# Patient Record
Sex: FEMALE | Race: WHITE | NOT HISPANIC OR LATINO | Employment: OTHER | URBAN - METROPOLITAN AREA
[De-identification: names, ages, dates, MRNs, and addresses within clinical notes are randomized per-mention and may not be internally consistent; named-entity substitution may affect disease eponyms.]

---

## 2017-06-25 DIAGNOSIS — M54.2 CHRONIC NECK PAIN: ICD-10-CM

## 2017-06-25 DIAGNOSIS — G89.29 CHRONIC NECK PAIN: ICD-10-CM

## 2017-06-26 RX ORDER — METHOCARBAMOL 750 MG/1
TABLET, FILM COATED ORAL
Qty: 90 TABLET | Refills: 0 | Status: SHIPPED | OUTPATIENT
Start: 2017-06-26 | End: 2017-10-08 | Stop reason: SDUPTHER

## 2017-08-17 ENCOUNTER — OFFICE VISIT (OUTPATIENT)
Dept: INTERNAL MEDICINE | Facility: CLINIC | Age: 47
End: 2017-08-17

## 2017-08-17 VITALS
BODY MASS INDEX: 25.65 KG/M2 | TEMPERATURE: 97.9 F | HEIGHT: 72 IN | DIASTOLIC BLOOD PRESSURE: 80 MMHG | OXYGEN SATURATION: 98 % | SYSTOLIC BLOOD PRESSURE: 140 MMHG | WEIGHT: 189.4 LBS | HEART RATE: 84 BPM

## 2017-08-17 DIAGNOSIS — G89.29 CHRONIC NECK PAIN: ICD-10-CM

## 2017-08-17 DIAGNOSIS — M54.2 CHRONIC NECK PAIN: ICD-10-CM

## 2017-08-17 DIAGNOSIS — K58.0 IRRITABLE BOWEL SYNDROME WITH DIARRHEA: ICD-10-CM

## 2017-08-17 DIAGNOSIS — Z00.00 ANNUAL PHYSICAL EXAM: Primary | ICD-10-CM

## 2017-08-17 LAB
25(OH)D3+25(OH)D2 SERPL-MCNC: 49.9 NG/ML
ALBUMIN SERPL-MCNC: 4.5 G/DL (ref 3.5–5.2)
ALBUMIN/GLOB SERPL: 1.5 G/DL
ALP SERPL-CCNC: 75 U/L (ref 40–129)
ALT SERPL-CCNC: 38 U/L (ref 5–33)
AST SERPL-CCNC: 32 U/L (ref 5–32)
BASOPHILS # BLD AUTO: 0.09 10*3/MM3 (ref 0–0.2)
BASOPHILS NFR BLD AUTO: 1.2 % (ref 0–2)
BILIRUB SERPL-MCNC: 0.5 MG/DL (ref 0.2–1.2)
BUN SERPL-MCNC: 14 MG/DL (ref 6–20)
BUN/CREAT SERPL: 17.9 (ref 7–25)
CALCIUM SERPL-MCNC: 9.3 MG/DL (ref 8.6–10.5)
CHLORIDE SERPL-SCNC: 99 MMOL/L (ref 98–107)
CHOLEST SERPL-MCNC: 202 MG/DL (ref 0–200)
CHOLEST/HDLC SERPL: 2.97 {RATIO}
CO2 SERPL-SCNC: 25.6 MMOL/L (ref 22–29)
CREAT SERPL-MCNC: 0.78 MG/DL (ref 0.57–1)
EOSINOPHIL # BLD AUTO: 0.13 10*3/MM3 (ref 0.1–0.3)
EOSINOPHIL NFR BLD AUTO: 1.7 % (ref 0–4)
ERYTHROCYTE [DISTWIDTH] IN BLOOD BY AUTOMATED COUNT: 12 % (ref 11.5–14.5)
GLOBULIN SER CALC-MCNC: 3.1 GM/DL
GLUCOSE SERPL-MCNC: 89 MG/DL (ref 65–99)
HCT VFR BLD AUTO: 44.2 % (ref 37–47)
HDLC SERPL-MCNC: 68 MG/DL (ref 40–60)
HGB BLD-MCNC: 14.7 G/DL (ref 12–16)
IMM GRANULOCYTES # BLD: 0.02 10*3/MM3 (ref 0–0.03)
IMM GRANULOCYTES NFR BLD: 0.3 % (ref 0–0.5)
LDLC SERPL CALC-MCNC: 115 MG/DL (ref 0–100)
LYMPHOCYTES # BLD AUTO: 2.44 10*3/MM3 (ref 0.6–4.8)
LYMPHOCYTES NFR BLD AUTO: 31.7 % (ref 20–45)
MCH RBC QN AUTO: 30.8 PG (ref 27–31)
MCHC RBC AUTO-ENTMCNC: 33.3 G/DL (ref 31–37)
MCV RBC AUTO: 92.7 FL (ref 81–99)
MONOCYTES # BLD AUTO: 0.52 10*3/MM3 (ref 0–1)
MONOCYTES NFR BLD AUTO: 6.8 % (ref 3–8)
NEUTROPHILS # BLD AUTO: 4.5 10*3/MM3 (ref 1.5–8.3)
NEUTROPHILS NFR BLD AUTO: 58.3 % (ref 45–70)
NRBC BLD AUTO-RTO: 0 /100 WBC (ref 0–0)
PLATELET # BLD AUTO: 280 10*3/MM3 (ref 140–500)
POTASSIUM SERPL-SCNC: 4.1 MMOL/L (ref 3.5–5.2)
PROT SERPL-MCNC: 7.6 G/DL (ref 6–8.5)
RBC # BLD AUTO: 4.77 10*6/MM3 (ref 4.2–5.4)
SODIUM SERPL-SCNC: 137 MMOL/L (ref 136–145)
TRIGL SERPL-MCNC: 96 MG/DL (ref 0–150)
TSH SERPL DL<=0.005 MIU/L-ACNC: 1.62 MIU/ML (ref 0.27–4.2)
VIT B12 SERPL-MCNC: 995 PG/ML (ref 211–946)
VLDLC SERPL CALC-MCNC: 19.2 MG/DL (ref 7–27)
WBC # BLD AUTO: 7.7 10*3/MM3 (ref 4.8–10.8)

## 2017-08-17 PROCEDURE — 90471 IMMUNIZATION ADMIN: CPT | Performed by: FAMILY MEDICINE

## 2017-08-17 PROCEDURE — 90715 TDAP VACCINE 7 YRS/> IM: CPT | Performed by: FAMILY MEDICINE

## 2017-08-17 PROCEDURE — 99396 PREV VISIT EST AGE 40-64: CPT | Performed by: FAMILY MEDICINE

## 2017-08-17 RX ORDER — CALCIUM CITRATE 1040MG
1 TABLET ORAL DAILY
COMMUNITY
End: 2021-09-14

## 2017-08-17 RX ORDER — AMITRIPTYLINE HYDROCHLORIDE 50 MG/1
50 TABLET, FILM COATED ORAL NIGHTLY
Qty: 90 TABLET | Refills: 3 | Status: SHIPPED | OUTPATIENT
Start: 2017-08-17 | End: 2018-09-13 | Stop reason: SDUPTHER

## 2017-08-17 RX ORDER — CALCIUM POLYCARBOPHIL 625 MG 625 MG/1
625 TABLET ORAL DAILY
COMMUNITY
End: 2020-11-09

## 2017-08-17 RX ORDER — BACLOFEN 20 MG/1
20 TABLET ORAL NIGHTLY
Qty: 90 TABLET | Refills: 3 | Status: SHIPPED | OUTPATIENT
Start: 2017-08-17 | End: 2018-09-13 | Stop reason: SDUPTHER

## 2017-08-17 RX ORDER — MELOXICAM 15 MG/1
15 TABLET ORAL DAILY
Qty: 90 TABLET | Refills: 3 | Status: SHIPPED | OUTPATIENT
Start: 2017-08-17 | End: 2018-09-13 | Stop reason: SDUPTHER

## 2017-08-17 RX ORDER — CETIRIZINE HYDROCHLORIDE 10 MG/1
10 TABLET ORAL DAILY
COMMUNITY
End: 2022-11-14

## 2017-08-17 RX ORDER — AMITRIPTYLINE HYDROCHLORIDE 50 MG/1
50 TABLET, FILM COATED ORAL NIGHTLY
Qty: 30 TABLET | Refills: 11 | Status: SHIPPED | OUTPATIENT
Start: 2017-08-17 | End: 2017-08-17 | Stop reason: SDUPTHER

## 2017-08-17 RX ORDER — TRAZODONE HYDROCHLORIDE 50 MG/1
50 TABLET ORAL NIGHTLY PRN
Qty: 90 TABLET | Refills: 3 | Status: SHIPPED | OUTPATIENT
Start: 2017-08-17 | End: 2018-09-13 | Stop reason: SDUPTHER

## 2017-08-23 ENCOUNTER — TELEPHONE (OUTPATIENT)
Dept: INTERNAL MEDICINE | Facility: CLINIC | Age: 47
End: 2017-08-23

## 2017-08-23 NOTE — TELEPHONE ENCOUNTER
----- Message from Keara Craig MD sent at 8/23/2017  1:44 PM EDT -----  Please call the patient regarding her result.  Her labs look fine except one of her liver enzymes is borderline elevated.  We'll monitor that.  Thanks.      Pt given lab results.dg

## 2017-09-07 ENCOUNTER — HOSPITAL ENCOUNTER (OUTPATIENT)
Dept: MAMMOGRAPHY | Facility: HOSPITAL | Age: 47
Discharge: HOME | End: 2017-09-07
Attending: FAMILY MEDICINE | Admitting: FAMILY MEDICINE

## 2017-09-07 DIAGNOSIS — Z00.00 ANNUAL PHYSICAL EXAM: ICD-10-CM

## 2017-09-07 PROCEDURE — 77063 BREAST TOMOSYNTHESIS BI: CPT

## 2017-09-07 PROCEDURE — G0202 SCR MAMMO BI INCL CAD: HCPCS

## 2017-09-22 DIAGNOSIS — K58.0 IRRITABLE BOWEL SYNDROME WITH DIARRHEA: ICD-10-CM

## 2017-09-22 RX ORDER — AMITRIPTYLINE HYDROCHLORIDE 25 MG/1
TABLET, FILM COATED ORAL
Qty: 90 TABLET | Refills: 2 | Status: SHIPPED | OUTPATIENT
Start: 2017-09-22 | End: 2018-09-13

## 2017-10-08 DIAGNOSIS — M54.2 CHRONIC NECK PAIN: ICD-10-CM

## 2017-10-08 DIAGNOSIS — G89.29 CHRONIC NECK PAIN: ICD-10-CM

## 2017-10-09 RX ORDER — METHOCARBAMOL 750 MG/1
TABLET, FILM COATED ORAL
Qty: 90 TABLET | Refills: 1 | Status: SHIPPED | OUTPATIENT
Start: 2017-10-09 | End: 2018-05-03 | Stop reason: SDUPTHER

## 2018-05-03 DIAGNOSIS — G89.29 CHRONIC NECK PAIN: ICD-10-CM

## 2018-05-03 DIAGNOSIS — M54.2 CHRONIC NECK PAIN: ICD-10-CM

## 2018-05-04 RX ORDER — METHOCARBAMOL 750 MG/1
TABLET, FILM COATED ORAL
Qty: 90 TABLET | Refills: 0 | Status: SHIPPED | OUTPATIENT
Start: 2018-05-04 | End: 2018-07-28 | Stop reason: SDUPTHER

## 2018-07-28 DIAGNOSIS — G89.29 CHRONIC NECK PAIN: ICD-10-CM

## 2018-07-28 DIAGNOSIS — M54.2 CHRONIC NECK PAIN: ICD-10-CM

## 2018-07-30 RX ORDER — METHOCARBAMOL 750 MG/1
TABLET, FILM COATED ORAL
Qty: 30 TABLET | Refills: 0 | Status: SHIPPED | OUTPATIENT
Start: 2018-07-30 | End: 2018-08-23 | Stop reason: SDUPTHER

## 2018-08-23 DIAGNOSIS — G89.29 CHRONIC NECK PAIN: ICD-10-CM

## 2018-08-23 DIAGNOSIS — M54.2 CHRONIC NECK PAIN: ICD-10-CM

## 2018-08-23 RX ORDER — METHOCARBAMOL 750 MG/1
750 TABLET, FILM COATED ORAL DAILY
Qty: 90 TABLET | Refills: 0 | Status: SHIPPED | OUTPATIENT
Start: 2018-08-23 | End: 2018-09-13 | Stop reason: SDUPTHER

## 2018-08-23 RX ORDER — DESOXIMETASONE 2.5 MG/G
CREAM TOPICAL 2 TIMES DAILY
Qty: 60 G | Refills: 0 | Status: SHIPPED | OUTPATIENT
Start: 2018-08-23 | End: 2022-09-21

## 2018-09-04 DIAGNOSIS — K58.0 IRRITABLE BOWEL SYNDROME WITH DIARRHEA: Primary | ICD-10-CM

## 2018-09-04 DIAGNOSIS — G47.00 INSOMNIA, UNSPECIFIED TYPE: ICD-10-CM

## 2018-09-04 DIAGNOSIS — E53.8 VITAMIN B 12 DEFICIENCY: ICD-10-CM

## 2018-09-04 DIAGNOSIS — Z00.00 HEALTHCARE MAINTENANCE: ICD-10-CM

## 2018-09-06 ENCOUNTER — LAB (OUTPATIENT)
Dept: LAB | Facility: HOSPITAL | Age: 48
End: 2018-09-06

## 2018-09-06 DIAGNOSIS — K58.0 IRRITABLE BOWEL SYNDROME WITH DIARRHEA: ICD-10-CM

## 2018-09-06 DIAGNOSIS — Z00.00 HEALTHCARE MAINTENANCE: ICD-10-CM

## 2018-09-06 DIAGNOSIS — E53.8 VITAMIN B 12 DEFICIENCY: ICD-10-CM

## 2018-09-06 DIAGNOSIS — G47.00 INSOMNIA, UNSPECIFIED TYPE: ICD-10-CM

## 2018-09-06 LAB
ALBUMIN SERPL-MCNC: 4.5 G/DL (ref 3.5–5.2)
ALBUMIN/GLOB SERPL: 1.7 G/DL
ALP SERPL-CCNC: 101 U/L (ref 39–117)
ALT SERPL W P-5'-P-CCNC: 53 U/L (ref 1–33)
ANION GAP SERPL CALCULATED.3IONS-SCNC: 8.8 MMOL/L
AST SERPL-CCNC: 34 U/L (ref 1–32)
BASOPHILS # BLD AUTO: 0.06 10*3/MM3 (ref 0–0.2)
BASOPHILS NFR BLD AUTO: 0.9 % (ref 0–1.5)
BILIRUB SERPL-MCNC: 0.4 MG/DL (ref 0.1–1.2)
BUN BLD-MCNC: 23 MG/DL (ref 6–20)
BUN/CREAT SERPL: 25 (ref 7–25)
CALCIUM SPEC-SCNC: 9.1 MG/DL (ref 8.6–10.5)
CHLORIDE SERPL-SCNC: 102 MMOL/L (ref 98–107)
CHOLEST SERPL-MCNC: 209 MG/DL (ref 0–200)
CO2 SERPL-SCNC: 28.2 MMOL/L (ref 22–29)
CREAT BLD-MCNC: 0.92 MG/DL (ref 0.57–1)
DEPRECATED RDW RBC AUTO: 44.2 FL (ref 37–54)
EOSINOPHIL # BLD AUTO: 0.24 10*3/MM3 (ref 0–0.7)
EOSINOPHIL NFR BLD AUTO: 3.4 % (ref 0.3–6.2)
ERYTHROCYTE [DISTWIDTH] IN BLOOD BY AUTOMATED COUNT: 12.8 % (ref 11.7–13)
GFR SERPL CREATININE-BSD FRML MDRD: 66 ML/MIN/1.73
GLOBULIN UR ELPH-MCNC: 2.7 GM/DL
GLUCOSE BLD-MCNC: 97 MG/DL (ref 65–99)
HCT VFR BLD AUTO: 44.7 % (ref 35.6–45.5)
HDLC SERPL-MCNC: 67 MG/DL (ref 40–60)
HGB BLD-MCNC: 14.3 G/DL (ref 11.9–15.5)
IMM GRANULOCYTES # BLD: 0 10*3/MM3 (ref 0–0.03)
IMM GRANULOCYTES NFR BLD: 0 % (ref 0–0.5)
LDLC SERPL CALC-MCNC: 126 MG/DL (ref 0–100)
LDLC/HDLC SERPL: 1.88 {RATIO}
LYMPHOCYTES # BLD AUTO: 2.61 10*3/MM3 (ref 0.9–4.8)
LYMPHOCYTES NFR BLD AUTO: 37.3 % (ref 19.6–45.3)
MCH RBC QN AUTO: 30.5 PG (ref 26.9–32)
MCHC RBC AUTO-ENTMCNC: 32 G/DL (ref 32.4–36.3)
MCV RBC AUTO: 95.3 FL (ref 80.5–98.2)
MONOCYTES # BLD AUTO: 0.44 10*3/MM3 (ref 0.2–1.2)
MONOCYTES NFR BLD AUTO: 6.3 % (ref 5–12)
NEUTROPHILS # BLD AUTO: 3.64 10*3/MM3 (ref 1.9–8.1)
NEUTROPHILS NFR BLD AUTO: 52.1 % (ref 42.7–76)
PLATELET # BLD AUTO: 229 10*3/MM3 (ref 140–500)
PMV BLD AUTO: 10.4 FL (ref 6–12)
POTASSIUM BLD-SCNC: 4.4 MMOL/L (ref 3.5–5.2)
PROT SERPL-MCNC: 7.2 G/DL (ref 6–8.5)
RBC # BLD AUTO: 4.69 10*6/MM3 (ref 3.9–5.2)
SODIUM BLD-SCNC: 139 MMOL/L (ref 136–145)
TRIGL SERPL-MCNC: 81 MG/DL (ref 0–150)
TSH SERPL DL<=0.05 MIU/L-ACNC: 8.17 MIU/ML (ref 0.27–4.2)
VIT B12 BLD-MCNC: 753 PG/ML (ref 211–946)
VLDLC SERPL-MCNC: 16.2 MG/DL (ref 5–40)
WBC NRBC COR # BLD: 6.99 10*3/MM3 (ref 4.5–10.7)

## 2018-09-06 PROCEDURE — 80053 COMPREHEN METABOLIC PANEL: CPT

## 2018-09-06 PROCEDURE — 85025 COMPLETE CBC W/AUTO DIFF WBC: CPT

## 2018-09-06 PROCEDURE — 82607 VITAMIN B-12: CPT | Performed by: FAMILY MEDICINE

## 2018-09-06 PROCEDURE — 36415 COLL VENOUS BLD VENIPUNCTURE: CPT

## 2018-09-06 PROCEDURE — 84443 ASSAY THYROID STIM HORMONE: CPT

## 2018-09-06 PROCEDURE — 80061 LIPID PANEL: CPT

## 2018-09-13 ENCOUNTER — OFFICE VISIT (OUTPATIENT)
Dept: INTERNAL MEDICINE | Facility: CLINIC | Age: 48
End: 2018-09-13

## 2018-09-13 VITALS
BODY MASS INDEX: 25.81 KG/M2 | HEIGHT: 72 IN | HEART RATE: 88 BPM | WEIGHT: 190.6 LBS | DIASTOLIC BLOOD PRESSURE: 90 MMHG | TEMPERATURE: 97.9 F | SYSTOLIC BLOOD PRESSURE: 148 MMHG | OXYGEN SATURATION: 98 % | RESPIRATION RATE: 16 BRPM

## 2018-09-13 DIAGNOSIS — K58.0 IRRITABLE BOWEL SYNDROME WITH DIARRHEA: ICD-10-CM

## 2018-09-13 DIAGNOSIS — G89.29 CHRONIC NECK PAIN: ICD-10-CM

## 2018-09-13 DIAGNOSIS — M54.2 CHRONIC NECK PAIN: ICD-10-CM

## 2018-09-13 DIAGNOSIS — Z00.00 ANNUAL PHYSICAL EXAM: Primary | ICD-10-CM

## 2018-09-13 DIAGNOSIS — R74.01 ELEVATED TRANSAMINASE LEVEL: ICD-10-CM

## 2018-09-13 DIAGNOSIS — R79.89 ELEVATED TSH: ICD-10-CM

## 2018-09-13 PROCEDURE — 99396 PREV VISIT EST AGE 40-64: CPT | Performed by: FAMILY MEDICINE

## 2018-09-13 RX ORDER — AMITRIPTYLINE HYDROCHLORIDE 50 MG/1
50 TABLET, FILM COATED ORAL NIGHTLY
Qty: 90 TABLET | Refills: 3 | Status: SHIPPED | OUTPATIENT
Start: 2018-09-13 | End: 2019-09-04 | Stop reason: SDUPTHER

## 2018-09-13 RX ORDER — BACLOFEN 20 MG/1
20 TABLET ORAL NIGHTLY
Qty: 90 TABLET | Refills: 3 | Status: SHIPPED | OUTPATIENT
Start: 2018-09-13 | End: 2019-11-07 | Stop reason: SDUPTHER

## 2018-09-13 RX ORDER — TRAZODONE HYDROCHLORIDE 50 MG/1
50 TABLET ORAL NIGHTLY PRN
Qty: 90 TABLET | Refills: 3 | Status: SHIPPED | OUTPATIENT
Start: 2018-09-13 | End: 2019-09-04 | Stop reason: SDUPTHER

## 2018-09-13 RX ORDER — LEVOTHYROXINE SODIUM 0.05 MG/1
50 TABLET ORAL DAILY
Qty: 30 TABLET | Refills: 2 | Status: SHIPPED | OUTPATIENT
Start: 2018-09-13 | End: 2018-10-26 | Stop reason: SDUPTHER

## 2018-09-13 RX ORDER — MELOXICAM 15 MG/1
15 TABLET ORAL DAILY
Qty: 90 TABLET | Refills: 3 | Status: SHIPPED | OUTPATIENT
Start: 2018-09-13 | End: 2019-11-07 | Stop reason: SDUPTHER

## 2018-09-13 RX ORDER — METHOCARBAMOL 750 MG/1
750 TABLET, FILM COATED ORAL DAILY
Qty: 90 TABLET | Refills: 0 | Status: SHIPPED | OUTPATIENT
Start: 2018-09-13 | End: 2019-02-09 | Stop reason: SDUPTHER

## 2018-09-13 NOTE — PROGRESS NOTES
Patient Name: Nadja Shipley    SUBJECTIVE    Nadja is a 48 y.o. female presenting for Annual Exam (CPE, lab results )      Well Adult Physical   Patient here for a comprehensive physical exam.The patient reports problems - fatigue    Do you take any herbs or supplements that were not prescribed by a doctor? no   Are you taking calcium supplements? yes   Are you taking aspirin daily? no     History:  Any STD's in the past? none    Nadja Shipley 48 y.o. female who presents for an Annual Wellness Visit.  she has a history of   Patient Active Problem List   Diagnosis   • Irritable bowel syndrome   • Chronic neck pain   • Rosacea   • Insomnia   .  she has been doing well with new interval problems.      Health Habits:  Dental Exam. up to date  Eye Exam. not up to date -    Exercise: 7 times/week.  Current exercise activities include: walking and yoga      The following portions of the patient's history were reviewed and updated as appropriate: allergies, current medications, past family history, past medical history, past social history, past surgical history and problem list.    Review of Systems   Constitutional: Negative.    HENT: Negative.    Eyes: Negative.    Respiratory: Negative.    Cardiovascular: Negative.    Gastrointestinal: Negative.    Endocrine: Negative.    Genitourinary: Negative.    Musculoskeletal: Negative.    Skin: Negative.    Allergic/Immunologic: Negative.    Neurological: Negative.    Hematological: Negative.    Psychiatric/Behavioral: Negative.        Patient has no known allergies.      Current Outpatient Prescriptions:   •  amitriptyline (ELAVIL) 50 MG tablet, Take 1 tablet by mouth Every Night., Disp: 90 tablet, Rfl: 3  •  baclofen (LIORESAL) 20 MG tablet, Take 1 tablet by mouth Every Night., Disp: 90 tablet, Rfl: 3  •  Calcium Citrate 1040 MG tablet, Take 1 tablet by mouth Daily., Disp: , Rfl:   •  calcium polycarbophil (FIBERCON) 625 MG tablet, Take 625 mg by mouth Daily., Disp: , Rfl:   •   "cetirizine (zyrTEC) 10 MG tablet, Take 10 mg by mouth Daily., Disp: , Rfl:   •  desoximetasone (TOPICORT) 0.25 % cream, Apply  topically to the appropriate area as directed 2 (Two) Times a Day., Disp: 60 g, Rfl: 0  •  fluticasone (FLONASE) 50 MCG/ACT nasal spray, 2 sprays into each nostril daily. Administer 2 sprays in each nostril for each dose., Disp: 3 each, Rfl: 3  •  magnesium chloride ER (MAG-DELAY) 535 (64 Mg) MG CR tablet, Take 1 tablet by mouth Daily., Disp: , Rfl:   •  meloxicam (MOBIC) 15 MG tablet, Take 1 tablet by mouth Daily., Disp: 90 tablet, Rfl: 3  •  methocarbamol (ROBAXIN) 750 MG tablet, Take 1 tablet by mouth Daily., Disp: 90 tablet, Rfl: 0  •  Multiple Vitamins-Minerals (MULTIVITAMIN ADULT PO), Take 1 tablet by mouth Daily., Disp: , Rfl:   •  Probiotic Product (PROBIOTIC ADVANCED PO), Take 1 capsule by mouth Daily., Disp: , Rfl:   •  Simethicone 250 MG capsule, Take 4 capsules by mouth Daily., Disp: , Rfl:   •  traZODone (DESYREL) 50 MG tablet, Take 1 tablet by mouth At Night As Needed for Sleep., Disp: 90 tablet, Rfl: 3  •  levothyroxine (SYNTHROID, LEVOTHROID) 50 MCG tablet, Take 1 tablet by mouth Daily., Disp: 30 tablet, Rfl: 2    OBJECTIVE    /90 (BP Location: Right arm, Patient Position: Sitting, Cuff Size: Large Adult)   Pulse 88   Temp 97.9 °F (36.6 °C) (Oral)   Resp 16   Ht 182.9 cm (72.01\")   Wt 86.5 kg (190 lb 9.6 oz)   SpO2 98%   BMI 25.84 kg/m²     Physical Exam   Constitutional: She is oriented to person, place, and time. She appears well-developed and well-nourished.   HENT:   Head: Normocephalic and atraumatic.   Right Ear: Tympanic membrane and external ear normal.   Left Ear: Tympanic membrane and external ear normal.   Nose: Nose normal.   Mouth/Throat: Oropharynx is clear and moist.   Eyes: Pupils are equal, round, and reactive to light. Conjunctivae and EOM are normal. No scleral icterus.   Neck: Normal range of motion. Neck supple. No thyromegaly present. "   Cardiovascular: Normal rate, regular rhythm, normal heart sounds and intact distal pulses.  Exam reveals no gallop and no friction rub.    No murmur heard.  Pulmonary/Chest: Effort normal and breath sounds normal. No respiratory distress. She has no wheezes. She has no rales.   Abdominal: Soft. Bowel sounds are normal. There is no hepatosplenomegaly.   Musculoskeletal: She exhibits no edema or deformity.   Lymphadenopathy:     She has no cervical adenopathy.   Neurological: She is alert and oriented to person, place, and time. She has normal reflexes. She displays normal reflexes. No cranial nerve deficit. She exhibits normal muscle tone. Coordination normal.   Skin: Skin is warm and dry. No rash noted.   Psychiatric: She has a normal mood and affect. Her behavior is normal. Judgment and thought content normal.   Nursing note and vitals reviewed.        ASSESSMENT AND PLAN  Update vaccines if indicated.    continue current medications, continue current healthy lifestyle patterns and return for routine annual checkups    Nadja was seen today for annual exam.    Diagnoses and all orders for this visit:    Annual physical exam    Chronic neck pain  -     methocarbamol (ROBAXIN) 750 MG tablet; Take 1 tablet by mouth Daily.  -     meloxicam (MOBIC) 15 MG tablet; Take 1 tablet by mouth Daily.  -     baclofen (LIORESAL) 20 MG tablet; Take 1 tablet by mouth Every Night.    Irritable bowel syndrome with diarrhea  -     amitriptyline (ELAVIL) 50 MG tablet; Take 1 tablet by mouth Every Night.    Elevated TSH  -     TSH  -     T4, Free  -     Thyroid Antibodies  -     levothyroxine (SYNTHROID, LEVOTHROID) 50 MCG tablet; Take 1 tablet by mouth Daily.    Elevated transaminase level    Other orders  -     traZODone (DESYREL) 50 MG tablet; Take 1 tablet by mouth At Night As Needed for Sleep.    1.  Annual exam.  Mammogram next year.  Pap smear in 2020.  Vaccines UTD (flu vaccine at work)/    2. Elevated TSH.  Pt symptomatic.   Check thyroid panel today.  Start levothyroxine.    3. Elevated transaminase level.  Recheck in 6 weeks.      [unfilled]    No Follow-up on file.

## 2018-09-14 LAB
T4 FREE SERPL-MCNC: 1.11 NG/DL (ref 0.93–1.7)
THYROGLOB AB SERPL-ACNC: <1 IU/ML (ref 0–0.9)
THYROPEROXIDASE AB SERPL-ACNC: 15 IU/ML (ref 0–34)
TSH SERPL DL<=0.005 MIU/L-ACNC: 8.84 MIU/ML (ref 0.27–4.2)

## 2018-09-20 DIAGNOSIS — R74.01 ELEVATED TRANSAMINASE LEVEL: ICD-10-CM

## 2018-09-20 DIAGNOSIS — E03.9 HYPOTHYROIDISM, UNSPECIFIED TYPE: Primary | ICD-10-CM

## 2018-10-25 ENCOUNTER — LAB (OUTPATIENT)
Dept: LAB | Facility: HOSPITAL | Age: 48
End: 2018-10-25

## 2018-10-25 DIAGNOSIS — E03.9 HYPOTHYROIDISM, UNSPECIFIED TYPE: ICD-10-CM

## 2018-10-25 DIAGNOSIS — R74.01 ELEVATED TRANSAMINASE LEVEL: ICD-10-CM

## 2018-10-25 LAB
ALBUMIN SERPL-MCNC: 4.1 G/DL (ref 3.5–5.2)
ALBUMIN/GLOB SERPL: 1.2 G/DL
ALP SERPL-CCNC: 89 U/L (ref 39–117)
ALT SERPL W P-5'-P-CCNC: 35 U/L (ref 1–33)
ANION GAP SERPL CALCULATED.3IONS-SCNC: 10.4 MMOL/L
AST SERPL-CCNC: 26 U/L (ref 1–32)
BILIRUB SERPL-MCNC: 0.4 MG/DL (ref 0.1–1.2)
BUN BLD-MCNC: 11 MG/DL (ref 6–20)
BUN/CREAT SERPL: 14.1 (ref 7–25)
CALCIUM SPEC-SCNC: 9.2 MG/DL (ref 8.6–10.5)
CHLORIDE SERPL-SCNC: 100 MMOL/L (ref 98–107)
CO2 SERPL-SCNC: 25.6 MMOL/L (ref 22–29)
CREAT BLD-MCNC: 0.78 MG/DL (ref 0.57–1)
GFR SERPL CREATININE-BSD FRML MDRD: 80 ML/MIN/1.73
GLOBULIN UR ELPH-MCNC: 3.3 GM/DL
GLUCOSE BLD-MCNC: 100 MG/DL (ref 65–99)
POTASSIUM BLD-SCNC: 4.3 MMOL/L (ref 3.5–5.2)
PROT SERPL-MCNC: 7.4 G/DL (ref 6–8.5)
SODIUM BLD-SCNC: 136 MMOL/L (ref 136–145)
T4 FREE SERPL-MCNC: 1.24 NG/DL (ref 0.93–1.7)
TSH SERPL DL<=0.05 MIU/L-ACNC: 4.48 MIU/ML (ref 0.27–4.2)

## 2018-10-25 PROCEDURE — 84439 ASSAY OF FREE THYROXINE: CPT

## 2018-10-25 PROCEDURE — 84443 ASSAY THYROID STIM HORMONE: CPT

## 2018-10-25 PROCEDURE — 36415 COLL VENOUS BLD VENIPUNCTURE: CPT

## 2018-10-25 PROCEDURE — 80053 COMPREHEN METABOLIC PANEL: CPT

## 2018-10-26 ENCOUNTER — TELEPHONE (OUTPATIENT)
Dept: INTERNAL MEDICINE | Facility: CLINIC | Age: 48
End: 2018-10-26

## 2018-10-26 DIAGNOSIS — E03.9 HYPOTHYROIDISM, UNSPECIFIED TYPE: Primary | ICD-10-CM

## 2018-10-26 DIAGNOSIS — R79.89 ELEVATED TSH: ICD-10-CM

## 2018-10-26 RX ORDER — LEVOTHYROXINE SODIUM 0.07 MG/1
75 TABLET ORAL DAILY
Qty: 30 TABLET | Refills: 2 | Status: SHIPPED | OUTPATIENT
Start: 2018-10-26 | End: 2019-01-21 | Stop reason: SDUPTHER

## 2018-10-26 NOTE — TELEPHONE ENCOUNTER
----- Message from Keara Craig MD sent at 10/26/2018  2:01 PM EDT -----  Please call the patient regarding her result.  Liver enzymes improved.  Thyroid levels improved but not at goal.  I'm increasing levothyroxine to 75 mg daily and have ordered repeat labs for her to have done in 6 weeks.    Pt given lab results and info regarding meds.dg

## 2018-10-31 ENCOUNTER — RESULTS ENCOUNTER (OUTPATIENT)
Dept: INTERNAL MEDICINE | Facility: CLINIC | Age: 48
End: 2018-10-31

## 2018-10-31 DIAGNOSIS — E03.9 HYPOTHYROIDISM, UNSPECIFIED TYPE: ICD-10-CM

## 2018-12-14 ENCOUNTER — APPOINTMENT (OUTPATIENT)
Dept: LAB | Facility: HOSPITAL | Age: 48
End: 2018-12-14

## 2018-12-14 LAB
T4 FREE SERPL-MCNC: 1.2 NG/DL (ref 0.93–1.7)
TSH SERPL DL<=0.05 MIU/L-ACNC: 3.38 MIU/ML (ref 0.27–4.2)

## 2018-12-14 PROCEDURE — 84439 ASSAY OF FREE THYROXINE: CPT | Performed by: FAMILY MEDICINE

## 2018-12-14 PROCEDURE — 36415 COLL VENOUS BLD VENIPUNCTURE: CPT | Performed by: FAMILY MEDICINE

## 2018-12-14 PROCEDURE — 84443 ASSAY THYROID STIM HORMONE: CPT | Performed by: FAMILY MEDICINE

## 2018-12-18 ENCOUNTER — TELEPHONE (OUTPATIENT)
Dept: INTERNAL MEDICINE | Facility: CLINIC | Age: 48
End: 2018-12-18

## 2018-12-18 NOTE — TELEPHONE ENCOUNTER
----- Message from Keara Craig MD sent at 12/17/2018  5:14 PM EST -----  Please call the patient regarding her result.  Thyroid levels look better.  Continue same dose.    Pt called and given results and instructions.dg

## 2019-01-21 DIAGNOSIS — E03.9 HYPOTHYROIDISM, UNSPECIFIED TYPE: ICD-10-CM

## 2019-01-21 RX ORDER — LEVOTHYROXINE SODIUM 0.07 MG/1
TABLET ORAL
Qty: 90 TABLET | Refills: 1 | Status: SHIPPED | OUTPATIENT
Start: 2019-01-21 | End: 2019-01-22 | Stop reason: SDUPTHER

## 2019-01-22 DIAGNOSIS — E03.9 HYPOTHYROIDISM, UNSPECIFIED TYPE: ICD-10-CM

## 2019-01-22 RX ORDER — LEVOTHYROXINE SODIUM 0.07 MG/1
75 TABLET ORAL DAILY
Qty: 90 TABLET | Refills: 3 | Status: SHIPPED | OUTPATIENT
Start: 2019-01-22 | End: 2019-11-07 | Stop reason: SDUPTHER

## 2019-02-09 DIAGNOSIS — M54.2 CHRONIC NECK PAIN: ICD-10-CM

## 2019-02-09 DIAGNOSIS — G89.29 CHRONIC NECK PAIN: ICD-10-CM

## 2019-02-11 RX ORDER — METHOCARBAMOL 750 MG/1
TABLET, FILM COATED ORAL
Qty: 90 TABLET | Refills: 1 | Status: SHIPPED | OUTPATIENT
Start: 2019-02-11 | End: 2019-08-08 | Stop reason: SDUPTHER

## 2019-06-10 DIAGNOSIS — E03.9 HYPOTHYROIDISM, UNSPECIFIED TYPE: Primary | ICD-10-CM

## 2019-06-12 ENCOUNTER — APPOINTMENT (OUTPATIENT)
Dept: LAB | Facility: HOSPITAL | Age: 49
End: 2019-06-12

## 2019-06-12 LAB
T4 FREE SERPL-MCNC: 1.31 NG/DL (ref 0.93–1.7)
TSH SERPL DL<=0.05 MIU/L-ACNC: 3.27 MIU/ML (ref 0.27–4.2)

## 2019-06-12 PROCEDURE — 84439 ASSAY OF FREE THYROXINE: CPT | Performed by: FAMILY MEDICINE

## 2019-06-12 PROCEDURE — 84443 ASSAY THYROID STIM HORMONE: CPT | Performed by: FAMILY MEDICINE

## 2019-06-12 PROCEDURE — 36415 COLL VENOUS BLD VENIPUNCTURE: CPT | Performed by: FAMILY MEDICINE

## 2019-06-15 ENCOUNTER — RESULTS ENCOUNTER (OUTPATIENT)
Dept: INTERNAL MEDICINE | Facility: CLINIC | Age: 49
End: 2019-06-15

## 2019-06-15 DIAGNOSIS — E03.9 HYPOTHYROIDISM, UNSPECIFIED TYPE: ICD-10-CM

## 2019-08-08 DIAGNOSIS — G89.29 CHRONIC NECK PAIN: ICD-10-CM

## 2019-08-08 DIAGNOSIS — M54.2 CHRONIC NECK PAIN: ICD-10-CM

## 2019-08-09 RX ORDER — METHOCARBAMOL 750 MG/1
TABLET, FILM COATED ORAL
Qty: 90 TABLET | Refills: 0 | Status: SHIPPED | OUTPATIENT
Start: 2019-08-09 | End: 2019-11-07 | Stop reason: SDUPTHER

## 2019-09-04 DIAGNOSIS — K58.0 IRRITABLE BOWEL SYNDROME WITH DIARRHEA: ICD-10-CM

## 2019-09-04 RX ORDER — AMITRIPTYLINE HYDROCHLORIDE 50 MG/1
TABLET, FILM COATED ORAL
Qty: 30 TABLET | Refills: 0 | Status: SHIPPED | OUTPATIENT
Start: 2019-09-04 | End: 2019-11-07 | Stop reason: SDUPTHER

## 2019-09-04 RX ORDER — TRAZODONE HYDROCHLORIDE 50 MG/1
TABLET ORAL
Qty: 30 TABLET | Refills: 0 | Status: SHIPPED | OUTPATIENT
Start: 2019-09-04 | End: 2019-10-01 | Stop reason: SDUPTHER

## 2019-10-02 RX ORDER — TRAZODONE HYDROCHLORIDE 50 MG/1
TABLET ORAL
Qty: 30 TABLET | Refills: 0 | Status: SHIPPED | OUTPATIENT
Start: 2019-10-02 | End: 2019-10-31 | Stop reason: SDUPTHER

## 2019-10-08 ENCOUNTER — TELEPHONE (OUTPATIENT)
Dept: INTERNAL MEDICINE | Facility: CLINIC | Age: 49
End: 2019-10-08

## 2019-10-08 DIAGNOSIS — Z00.00 ROUTINE ADULT HEALTH MAINTENANCE: Primary | ICD-10-CM

## 2019-10-08 NOTE — TELEPHONE ENCOUNTER
Done    ----- Message from Ruby Craig sent at 10/7/2019  3:50 PM EDT -----  Contact: patient  javi    Please add orders to chart for labs patient to have them done at hosp

## 2019-10-31 ENCOUNTER — LAB (OUTPATIENT)
Dept: LAB | Facility: HOSPITAL | Age: 49
End: 2019-10-31

## 2019-10-31 DIAGNOSIS — Z00.00 ROUTINE ADULT HEALTH MAINTENANCE: ICD-10-CM

## 2019-10-31 LAB
ALBUMIN SERPL-MCNC: 4 G/DL (ref 3.5–5.2)
ALBUMIN/GLOB SERPL: 1.1 G/DL
ALP SERPL-CCNC: 88 U/L (ref 39–117)
ALT SERPL W P-5'-P-CCNC: 24 U/L (ref 1–33)
ANION GAP SERPL CALCULATED.3IONS-SCNC: 10.6 MMOL/L (ref 5–15)
AST SERPL-CCNC: 23 U/L (ref 1–32)
BASOPHILS # BLD AUTO: 0.06 10*3/MM3 (ref 0–0.2)
BASOPHILS NFR BLD AUTO: 1.1 % (ref 0–1.5)
BILIRUB SERPL-MCNC: 0.3 MG/DL (ref 0.2–1.2)
BUN BLD-MCNC: 14 MG/DL (ref 6–20)
BUN/CREAT SERPL: 18.4 (ref 7–25)
CALCIUM SPEC-SCNC: 9.1 MG/DL (ref 8.6–10.5)
CHLORIDE SERPL-SCNC: 103 MMOL/L (ref 98–107)
CHOLEST SERPL-MCNC: 234 MG/DL (ref 0–200)
CO2 SERPL-SCNC: 26.4 MMOL/L (ref 22–29)
CREAT BLD-MCNC: 0.76 MG/DL (ref 0.57–1)
DEPRECATED RDW RBC AUTO: 42.5 FL (ref 37–54)
EOSINOPHIL # BLD AUTO: 0.2 10*3/MM3 (ref 0–0.4)
EOSINOPHIL NFR BLD AUTO: 3.8 % (ref 0.3–6.2)
ERYTHROCYTE [DISTWIDTH] IN BLOOD BY AUTOMATED COUNT: 12.2 % (ref 12.3–15.4)
GFR SERPL CREATININE-BSD FRML MDRD: 82 ML/MIN/1.73
GLOBULIN UR ELPH-MCNC: 3.6 GM/DL
GLUCOSE BLD-MCNC: 101 MG/DL (ref 65–99)
HCT VFR BLD AUTO: 42.5 % (ref 34–46.6)
HDLC SERPL QL: 3.25
HDLC SERPL-MCNC: 72 MG/DL (ref 40–60)
HGB BLD-MCNC: 14.1 G/DL (ref 12–15.9)
IMM GRANULOCYTES # BLD AUTO: 0.01 10*3/MM3 (ref 0–0.05)
IMM GRANULOCYTES NFR BLD AUTO: 0.2 % (ref 0–0.5)
LDLC SERPL CALC-MCNC: 151 MG/DL (ref 0–100)
LYMPHOCYTES # BLD AUTO: 2.2 10*3/MM3 (ref 0.7–3.1)
LYMPHOCYTES NFR BLD AUTO: 41.5 % (ref 19.6–45.3)
MCH RBC QN AUTO: 31.1 PG (ref 26.6–33)
MCHC RBC AUTO-ENTMCNC: 33.2 G/DL (ref 31.5–35.7)
MCV RBC AUTO: 93.6 FL (ref 79–97)
MONOCYTES # BLD AUTO: 0.31 10*3/MM3 (ref 0.1–0.9)
MONOCYTES NFR BLD AUTO: 5.8 % (ref 5–12)
NEUTROPHILS # BLD AUTO: 2.52 10*3/MM3 (ref 1.7–7)
NEUTROPHILS NFR BLD AUTO: 47.6 % (ref 42.7–76)
NRBC BLD AUTO-RTO: 0.2 /100 WBC (ref 0–0.2)
PLATELET # BLD AUTO: 247 10*3/MM3 (ref 140–450)
PMV BLD AUTO: 10.2 FL (ref 6–12)
POTASSIUM BLD-SCNC: 4.1 MMOL/L (ref 3.5–5.2)
PROT SERPL-MCNC: 7.6 G/DL (ref 6–8.5)
RBC # BLD AUTO: 4.54 10*6/MM3 (ref 3.77–5.28)
SODIUM BLD-SCNC: 140 MMOL/L (ref 136–145)
T4 FREE SERPL-MCNC: 1.23 NG/DL (ref 0.93–1.7)
TRIGL SERPL-MCNC: 57 MG/DL (ref 0–150)
TSH SERPL DL<=0.05 MIU/L-ACNC: 3.13 UIU/ML (ref 0.27–4.2)
VLDLC SERPL-MCNC: 11.4 MG/DL (ref 5–40)
WBC NRBC COR # BLD: 5.3 10*3/MM3 (ref 3.4–10.8)

## 2019-10-31 PROCEDURE — 85025 COMPLETE CBC W/AUTO DIFF WBC: CPT

## 2019-10-31 PROCEDURE — 84443 ASSAY THYROID STIM HORMONE: CPT

## 2019-10-31 PROCEDURE — 84439 ASSAY OF FREE THYROXINE: CPT

## 2019-10-31 PROCEDURE — 80053 COMPREHEN METABOLIC PANEL: CPT

## 2019-10-31 PROCEDURE — 80061 LIPID PANEL: CPT

## 2019-10-31 PROCEDURE — 36415 COLL VENOUS BLD VENIPUNCTURE: CPT

## 2019-11-01 RX ORDER — TRAZODONE HYDROCHLORIDE 50 MG/1
TABLET ORAL
Qty: 30 TABLET | Refills: 0 | Status: SHIPPED | OUTPATIENT
Start: 2019-11-01 | End: 2019-11-07 | Stop reason: SDUPTHER

## 2019-11-07 ENCOUNTER — OFFICE VISIT (OUTPATIENT)
Dept: INTERNAL MEDICINE | Facility: CLINIC | Age: 49
End: 2019-11-07

## 2019-11-07 VITALS
HEIGHT: 72 IN | BODY MASS INDEX: 25.87 KG/M2 | DIASTOLIC BLOOD PRESSURE: 68 MMHG | TEMPERATURE: 98.5 F | SYSTOLIC BLOOD PRESSURE: 128 MMHG | HEART RATE: 91 BPM | OXYGEN SATURATION: 98 % | WEIGHT: 191 LBS

## 2019-11-07 DIAGNOSIS — G89.29 CHRONIC NECK PAIN: ICD-10-CM

## 2019-11-07 DIAGNOSIS — M54.2 CHRONIC NECK PAIN: ICD-10-CM

## 2019-11-07 DIAGNOSIS — E03.9 HYPOTHYROIDISM, UNSPECIFIED TYPE: ICD-10-CM

## 2019-11-07 DIAGNOSIS — Z00.00 ANNUAL PHYSICAL EXAM: Primary | ICD-10-CM

## 2019-11-07 DIAGNOSIS — Z12.4 ENCOUNTER FOR PAPANICOLAOU SMEAR FOR CERVICAL CANCER SCREENING: ICD-10-CM

## 2019-11-07 DIAGNOSIS — K58.0 IRRITABLE BOWEL SYNDROME WITH DIARRHEA: ICD-10-CM

## 2019-11-07 PROCEDURE — 99396 PREV VISIT EST AGE 40-64: CPT | Performed by: FAMILY MEDICINE

## 2019-11-07 RX ORDER — LEVOTHYROXINE SODIUM 0.07 MG/1
75 TABLET ORAL DAILY
Qty: 90 TABLET | Refills: 3 | Status: SHIPPED | OUTPATIENT
Start: 2019-11-07 | End: 2021-01-07 | Stop reason: SDUPTHER

## 2019-11-07 RX ORDER — MELOXICAM 15 MG/1
15 TABLET ORAL DAILY
Qty: 90 TABLET | Refills: 3 | Status: SHIPPED | OUTPATIENT
Start: 2019-11-07 | End: 2021-09-29

## 2019-11-07 RX ORDER — BACLOFEN 20 MG/1
20 TABLET ORAL NIGHTLY
Qty: 90 TABLET | Refills: 3 | Status: SHIPPED | OUTPATIENT
Start: 2019-11-07 | End: 2022-03-26 | Stop reason: SDUPTHER

## 2019-11-07 RX ORDER — AMITRIPTYLINE HYDROCHLORIDE 50 MG/1
50 TABLET, FILM COATED ORAL
Qty: 90 TABLET | Refills: 1 | Status: SHIPPED | OUTPATIENT
Start: 2019-11-07 | End: 2021-01-07 | Stop reason: SDUPTHER

## 2019-11-07 RX ORDER — TRAZODONE HYDROCHLORIDE 50 MG/1
50 TABLET ORAL NIGHTLY PRN
Qty: 90 TABLET | Refills: 1 | Status: SHIPPED | OUTPATIENT
Start: 2019-11-07 | End: 2020-06-04

## 2019-11-07 RX ORDER — METHOCARBAMOL 750 MG/1
750 TABLET, FILM COATED ORAL DAILY
Qty: 90 TABLET | Refills: 1 | Status: SHIPPED | OUTPATIENT
Start: 2019-11-07 | End: 2020-04-29

## 2019-11-07 NOTE — PROGRESS NOTES
Patient Name: Nadja Shipley    SUBJECTIVE    Nadja is a 49 y.o. female presenting for Annual Exam (pap if time)      Well Adult Physical   Patient here for a comprehensive physical exam.The patient reports no problems    Do you take any herbs or supplements that were not prescribed by a doctor? yes   Are you taking calcium supplements? yes   Are you taking aspirin daily? no     History:  Any STD's in the past? none    Nadja Shipley 49 y.o. female who presents for an Annual Wellness Visit.  she has a history of   Patient Active Problem List   Diagnosis   • Irritable bowel syndrome   • Chronic neck pain   • Rosacea   • Insomnia   • Annual physical exam   .  she has been doing well with new interval problems.      Health Habits:  Dental Exam. up to date  Eye Exam.not up to date  Exercise: 7 times/week.  Current exercise activities include: walking and yoga      The following portions of the patient's history were reviewed and updated as appropriate: allergies, current medications, past family history, past medical history, past social history, past surgical history and problem list.    Review of Systems   Constitutional: Negative.    HENT: Negative.    Eyes: Negative.    Respiratory: Negative.    Cardiovascular: Negative.    Gastrointestinal: Negative.    Endocrine: Negative.    Genitourinary: Negative.    Musculoskeletal: Negative.    Skin: Negative.    Allergic/Immunologic: Negative.    Neurological: Negative.    Hematological: Negative.    Psychiatric/Behavioral: Negative.        Patient has no known allergies.      Current Outpatient Medications:   •  amitriptyline (ELAVIL) 50 MG tablet, Take 1 tablet by mouth every night at bedtime., Disp: 90 tablet, Rfl: 1  •  baclofen (LIORESAL) 20 MG tablet, Take 1 tablet by mouth Every Night., Disp: 90 tablet, Rfl: 3  •  Calcium Citrate 1040 MG tablet, Take 1 tablet by mouth Daily., Disp: , Rfl:   •  calcium polycarbophil (FIBERCON) 625 MG tablet, Take 625 mg by mouth Daily.,  "Disp: , Rfl:   •  cetirizine (zyrTEC) 10 MG tablet, Take 10 mg by mouth Daily., Disp: , Rfl:   •  desoximetasone (TOPICORT) 0.25 % cream, Apply  topically to the appropriate area as directed 2 (Two) Times a Day., Disp: 60 g, Rfl: 0  •  fluticasone (FLONASE) 50 MCG/ACT nasal spray, 2 sprays into each nostril daily. Administer 2 sprays in each nostril for each dose., Disp: 3 each, Rfl: 3  •  levothyroxine (SYNTHROID, LEVOTHROID) 75 MCG tablet, Take 1 tablet by mouth Daily., Disp: 90 tablet, Rfl: 3  •  magnesium chloride ER (MAG-DELAY) 535 (64 Mg) MG CR tablet, Take 1 tablet by mouth Daily., Disp: , Rfl:   •  Multiple Vitamins-Minerals (MULTIVITAMIN ADULT PO), Take 1 tablet by mouth Daily., Disp: , Rfl:   •  Probiotic Product (PROBIOTIC ADVANCED PO), Take 1 capsule by mouth Daily., Disp: , Rfl:   •  Simethicone 250 MG capsule, Take 4 capsules by mouth Daily., Disp: , Rfl:   •  meloxicam (MOBIC) 15 MG tablet, Take 1 tablet by mouth Daily., Disp: 90 tablet, Rfl: 3  •  methocarbamol (ROBAXIN) 750 MG tablet, Take 1 tablet by mouth Daily., Disp: 90 tablet, Rfl: 1  •  traZODone (DESYREL) 50 MG tablet, Take 1 tablet by mouth At Night As Needed for Sleep., Disp: 90 tablet, Rfl: 1    OBJECTIVE    /68 (BP Location: Left arm, Patient Position: Sitting, Cuff Size: Adult)   Pulse 91   Temp 98.5 °F (36.9 °C)   Ht 182.9 cm (72\")   Wt 86.6 kg (191 lb)   SpO2 98%   BMI 25.90 kg/m²     Physical Exam   Constitutional: She is oriented to person, place, and time. She appears well-developed and well-nourished.   HENT:   Head: Normocephalic and atraumatic.   Right Ear: External ear normal.   Left Ear: External ear normal.   Nose: Nose normal.   Mouth/Throat: Oropharynx is clear and moist.   Eyes: Conjunctivae and EOM are normal. Pupils are equal, round, and reactive to light. No scleral icterus.   Neck: Normal range of motion. Neck supple. No thyromegaly present.   Cardiovascular: Normal rate, regular rhythm, normal heart sounds " and intact distal pulses. Exam reveals no gallop and no friction rub.   No murmur heard.  Pulmonary/Chest: Effort normal and breath sounds normal. No respiratory distress. She has no wheezes. She has no rales.   Abdominal: Soft. Bowel sounds are normal. There is no hepatosplenomegaly. There is no CVA tenderness.   Genitourinary: Vagina normal and uterus normal. Pelvic exam was performed with patient supine. There is no lesion on the right labia. There is no lesion on the left labia. Cervix exhibits no motion tenderness, no discharge and no friability. Right adnexum displays no mass, no tenderness and no fullness. Left adnexum displays no mass, no tenderness and no fullness. No vaginal discharge found.   Musculoskeletal: Normal range of motion. She exhibits no edema.   Lymphadenopathy:     She has no cervical adenopathy.   Neurological: She is alert and oriented to person, place, and time. She has normal reflexes. She displays normal reflexes. No cranial nerve deficit.   Skin: Skin is warm and dry. No lesion (No skin lesions.) noted.   Psychiatric: She has a normal mood and affect. Her behavior is normal.   Nursing note and vitals reviewed.          ASSESSMENT AND PLAN  Update vaccines if indicated.    continue current medications, continue current healthy lifestyle patterns and return for routine annual checkups    Nadja was seen today for annual exam.    Diagnoses and all orders for this visit:    Annual physical exam  -     Mammo Screening Bilateral With CAD; Future    Irritable bowel syndrome with diarrhea  -     amitriptyline (ELAVIL) 50 MG tablet; Take 1 tablet by mouth every night at bedtime.    Chronic neck pain  -     baclofen (LIORESAL) 20 MG tablet; Take 1 tablet by mouth Every Night.  -     methocarbamol (ROBAXIN) 750 MG tablet; Take 1 tablet by mouth Daily.  -     meloxicam (MOBIC) 15 MG tablet; Take 1 tablet by mouth Daily.    Hypothyroidism, unspecified type  -     levothyroxine (SYNTHROID, LEVOTHROID)  75 MCG tablet; Take 1 tablet by mouth Daily.    Other orders  -     traZODone (DESYREL) 50 MG tablet; Take 1 tablet by mouth At Night As Needed for Sleep.      Pap smear with HPV today.  Labs reviewed.  Mammogram ordered.   Flu vaccine UTD.  Tdap UTD 2017.    [unfilled]    Return in about 1 year (around 11/7/2020).

## 2019-11-08 DIAGNOSIS — Z12.4 ENCOUNTER FOR PAPANICOLAOU SMEAR FOR CERVICAL CANCER SCREENING: Primary | ICD-10-CM

## 2019-11-12 LAB
CYTOLOGIST CVX/VAG CYTO: NORMAL
CYTOLOGY CVX/VAG DOC CYTO: NORMAL
CYTOLOGY CVX/VAG DOC THIN PREP: NORMAL
DX ICD CODE: NORMAL
HIV 1 & 2 AB SER-IMP: NORMAL
HPV I/H RISK 1 DNA CVX QL PROBE+SIG AMP: NEGATIVE
OTHER STN SPEC: NORMAL
STAT OF ADQ CVX/VAG CYTO-IMP: NORMAL

## 2019-11-22 ENCOUNTER — HOSPITAL ENCOUNTER (OUTPATIENT)
Dept: MAMMOGRAPHY | Facility: HOSPITAL | Age: 49
Discharge: HOME | End: 2019-11-22
Admitting: FAMILY MEDICINE

## 2019-11-22 DIAGNOSIS — Z00.00 ANNUAL PHYSICAL EXAM: ICD-10-CM

## 2019-11-22 PROCEDURE — 77063 BREAST TOMOSYNTHESIS BI: CPT

## 2019-11-22 PROCEDURE — 77067 SCR MAMMO BI INCL CAD: CPT

## 2019-12-08 NOTE — PROGRESS NOTES
Patient : Sahara Ortiz Age: 40 year old Sex: female   MRN: [id number] Encounter Date: 12/7/2019      History     Chief Complaint   Patient presents with   • Headache Recurrent or Known DX Migraines     HPI   P03/P3  12/7/2019    9:41 PM Sahara Ortiz is a 40 year old female with a hx of COPD, DM and fibromyalgia who presents to the ED accompanied by her s/o for evaluation of a HA that began 2 weeks ago. She has been waking up every day for the last 2 weeks and started to develop a HA. Pt describes hx of chronic migraines and epilepsy on Trokendi and Amitriptyline. Her HA is localized to her upper head into her posterior head radiation into the neck and upper back. This is typical of her chronic migraines. She has tried Excedrin medications without significant relief. Pt was prescribed sumatriptan nasal spray but had problems picking up the medications. Additional sx include nausea and vomiting as well as photophobia. The pt denies any other associated sx. Last week she has laser eye surgery. Hx of DVT and currently on Lovenox. There are no further complaints or modifying factors at this time.    Chart Review: I reviewed the patient's medications, allergies, and past medical and surgical history in [facility].     PCP: Radha Agarwal MD     Allergies   Allergen Reactions   • Clonazepam Palpitations     Also \"blacked out\".    • Grapes [Grape (Artificial) Flavor   (Food Or Med)] HIVES and ANAPHYLAXIS   • Lisinopril SWELLING       Current Discharge Medication List      Prior to Admission Medications    Details   LANTUS SOLOSTAR 100 UNIT/ML pen-injector ADMINISTER 60 UNITS UNDER THE SKIN EVERY NIGHT  Qty: 30 mL, Refills: 0      ARIPiprazole (ABILIFY) 5 MG tablet Take 10 mg by mouth daily.      fluticasone-salmeterol (ADVAIR DISKUS) 250-50 MCG/DOSE inhaler Inhale 1 puff into the lungs.      blood glucose (FREESTYLE LITE) test strip 1 strip 3 times daily.      Insulin Pen Needle 32G X 4 MM Misc 2 times daily.     Patient Name: Nadja Saez is a 47 y.o. female presenting for No chief complaint on file.      Well Adult Physical   Patient here for a comprehensive physical exam.The patient reports no problems    Do you take any herbs or supplements that were not prescribed by a doctor? yes multivitamin, Fibercon, probiotic,   Are you taking calcium supplements? yes   Are you taking aspirin daily? no     History:  Any STD's in the past? none    Nadja Shipley 47 y.o. female who presents for an Annual Wellness Visit.  she has a history of   Patient Active Problem List   Diagnosis   • Irritable bowel syndrome   • Chronic neck pain   • Rosacea   • Insomnia   .  she has been doing well with new interval problems.      Health Habits:  Dental Exam. up to date  Eye Exam. unknown  Exercise: 7 times/week.  Current exercise activities include: [employer] machine      The following portions of the patient's history were reviewed and updated as appropriate: allergies, current medications, past family history, past medical history, past social history, past surgical history and problem list.    Review of Systems   Constitutional: Negative.    HENT: Negative.    Eyes: Negative.    Respiratory: Negative.    Cardiovascular: Negative.    Endocrine: Negative.    Genitourinary: Negative.    Musculoskeletal: Negative.    Skin: Negative.    Allergic/Immunologic: Negative.    Neurological: Negative.    Hematological: Negative.    Psychiatric/Behavioral: Negative.        Review of patient's allergies indicates no known allergies.      Current Outpatient Prescriptions:   •  amitriptyline (ELAVIL) 25 MG tablet, Take 1 tablet by mouth every night., Disp: 90 tablet, Rfl: 3  •  baclofen (LIORESAL) 20 MG tablet, Take 1 tablet by mouth every night., Disp: 90 tablet, Rfl: 3  •  Calcium Citrate 1040 MG tablet, Take 1 tablet by mouth Daily., Disp: , Rfl:   •  calcium polycarbophil (FIBERCON) 625 MG tablet, Take 625 mg by mouth Daily., Disp: ,    Multiple Vitamin (MULTI-VITAMINS) Tab Take 1 tablet by mouth daily.      OLANZapine (ZYPREXA) 5 MG tablet Take 5 mg by mouth.      OXcarbazepine 300 MG extended-release tablet Take 1,200 mg by mouth daily.      pregabalin (LYRICA) 150 MG capsule Take 150 mg by mouth 2 times daily.      tiZANidine (ZANAFLEX) 4 MG tablet Take 4 mg by mouth daily as needed for Muscle spasms.  Refills: 0      topiramate (TROKENDI XR) 100 MG extended-release capsule Take 400 mg by mouth daily.      topiramate (TOPAMAX) 50 MG tablet Take 150 mg by mouth daily.      traZODone (DESYREL) 100 MG tablet Take 100 mg by mouth nightly as needed.      oxyCODONE, IMM REL, (ROXICODONE) 5 MG immediate release tablet Take 1 tablet by mouth every 4 hours as needed for Pain.  Qty: 48 tablet, Refills: 0      amitriptyline (ELAVIL) 50 MG tablet Take 1 tablet by mouth nightly.  Qty: 30 tablet, Refills: 0      DULoxetine (CYMBALTA) 30 MG capsule Take 1 capsule by mouth nightly.  Qty: 30 capsule, Refills: 0      insulin lispro (HUMALOG KWIKPEN) 100 UNIT/ML pen-injector Inject 20 units into the skin before each meal. Use correction scale as directed. Max daily dose 84 units.  Qty: 30 mL, Refills: 3      enoxaparin (LOVENOX) 100 MG/ML injectable solution Inject 100 mg into the skin every 12 hours.      insulin glargine (LANTUS SOLOSTAR) 100 UNIT/ML pen-injector Inject 50 Units into the skin 2 times daily.  Qty: 90 mL, Refills: 3      metformin (GLUCOPHAGE) 1000 MG tablet Take 1 tablet by mouth 2 times daily (with meals).  Qty: 180 tablet, Refills: 3      ondansetron (ZOFRAN ODT) 4 MG disintegrating tablet Place 1 tablet onto the tongue every 8 hours as needed for Nausea.  Qty: 10 tablet, Refills: 0      MAGNESIUM PO Take 1 tablet by mouth nightly.      clonazePAM (KLONOPIN) 2 MG tablet Take 2 mg by mouth 2 times daily as needed for Anxiety.       albuterol 108 (90 Base) MCG/ACT inhaler Inhale 2 puffs into the lungs every 4 hours as needed for Wheezing.  Qty:  "Rfl:   •  cetirizine (zyrTEC) 10 MG tablet, Take 10 mg by mouth Daily., Disp: , Rfl:   •  fluticasone (FLONASE) 50 MCG/ACT nasal spray, 2 sprays into each nostril daily. Administer 2 sprays in each nostril for each dose., Disp: 3 each, Rfl: 3  •  magnesium chloride ER (MAG-DELAY) 535 (64 Mg) MG CR tablet, Take 1 tablet by mouth Daily., Disp: , Rfl:   •  meloxicam (MOBIC) 15 MG tablet, Take 1 tablet by mouth daily., Disp: 90 tablet, Rfl: 3  •  methocarbamol (ROBAXIN) 750 MG tablet, TAKE ONE TABLET BY MOUTH DAILY AS NEEDED FOR MUSCLE SPASMS, Disp: 90 tablet, Rfl: 0  •  Multiple Vitamins-Minerals (MULTIVITAMIN ADULT PO), Take 1 tablet by mouth Daily., Disp: , Rfl:   •  Probiotic Product (PROBIOTIC ADVANCED PO), Take 1 capsule by mouth Daily., Disp: , Rfl:   •  Simethicone 250 MG capsule, Take 4 capsules by mouth Daily., Disp: , Rfl:   •  traZODone (DESYREL) 50 MG tablet, Take 1 tablet by mouth at night as needed for sleep., Disp: 90 tablet, Rfl: 3    OBJECTIVE    /80  Pulse 84  Temp 97.9 °F (36.6 °C)  Ht 72\" (182.9 cm)  Wt 189 lb 6.4 oz (85.9 kg)  LMP 07/15/2017  SpO2 98%  BMI 25.69 kg/m2    Physical Exam   Constitutional: She is oriented to person, place, and time. She appears well-developed and well-nourished.   HENT:   Head: Normocephalic and atraumatic.   Right Ear: External ear normal.   Left Ear: External ear normal.   Nose: Nose normal.   Mouth/Throat: Oropharynx is clear and moist.   Eyes: Conjunctivae and EOM are normal. Pupils are equal, round, and reactive to light. No scleral icterus.   Neck: Normal range of motion. Neck supple. No thyromegaly present.   Cardiovascular: Normal rate, regular rhythm, normal heart sounds and intact distal pulses.  Exam reveals no gallop and no friction rub.    No murmur heard.  Pulmonary/Chest: Effort normal and breath sounds normal. No respiratory distress. She has no wheezes. She has no rales. Right breast exhibits no inverted nipple, no mass, no nipple " 8.5 g, Refills: 0      milnacipran (SAVELLA) 50 MG Tab Take 50 mg by mouth nightly.       ergocalciferol (DRISDOL) 65016 units capsule Take 50,000 Units by mouth once a week. Friday      fluticasone (FLONASE) 50 MCG/ACT nasal spray Spray 1 spray in each nostril as needed. Indications: allergies      gemfibrozil (LOPID) 600 MG tablet Take 600 mg by mouth 2 times daily.       lidocaine (XYLOCAINE) 5 % ointment Apply 1 application topically 2 times daily as needed (To knees or other areas indicated by fibromyalgia pain).       montelukast (SINGULAIR) 10 MG tablet Take 10 mg by mouth nightly.       Multiple Vitamins-Minerals (MULTIVITAMIN PO) Take 1 tablet by mouth nightly.       pantoprazole (PROTONIX) 40 MG tablet Take 40 mg by mouth nightly.       SUMAtriptan (IMITREX) 20 MG/ACT nasal spray Spray 1 spray in alternating nostrils as needed for Migraine.             Past Medical History:   Diagnosis Date   • Anemia    • Anxiety    • Arthritis    • Blood clot associated with vein wall inflammation    • Bronchitis    • Chronic kidney disease    • Chronic pain    • COPD (chronic obstructive pulmonary disease) (CMS/HCC)    • Depression    • Diabetes mellitus (CMS/HCC)    • Factor V deficiency (CMS/HCC)    • Fibromyalgia    • Gastroesophageal reflux disease    • Genital herpes    • Head ache    • High cholesterol    • Myalgia    • Otitis media    • Pneumonia    • RAD (reactive airway disease)    • Spondylosis of lumbar region without myelopathy or radiculopathy    • Spondylosis, cervical    • Urinary tract infection        Past Surgical History:   Procedure Laterality Date   • CARDIAC CATHERIZATION     • CARPAL TUNNEL RELEASE  11/01/2013   • REMOVAL GALLBLADDER     • TONSILLECTOMY     • TUBAL LIGATION         Family History   Problem Relation Age of Onset   • Clotting Disorder Paternal Grandfather    • Diabetes Paternal Grandfather    • Diabetes Maternal Grandmother        Social History     Tobacco Use   • Smoking status:  discharge, no skin change and no tenderness. Left breast exhibits no inverted nipple, no mass, no nipple discharge, no skin change and no tenderness. Breasts are symmetrical.   Abdominal: Soft. Bowel sounds are normal. There is no hepatosplenomegaly.   Musculoskeletal: She exhibits no edema or deformity.   Lymphadenopathy:     She has no cervical adenopathy.   Neurological: She is alert and oriented to person, place, and time. She has normal reflexes. She displays normal reflexes. No cranial nerve deficit. She exhibits normal muscle tone. Coordination normal.   Skin: Skin is warm and dry. No rash noted.   Psychiatric: She has a normal mood and affect. Her behavior is normal. Judgment and thought content normal.           ASSESSMENT AND PLAN  Update vaccines if indicated.    continue current healthy lifestyle patterns and return for routine annual checkups    Diagnoses and all orders for this visit:    Annual physical exam  -     Mammo Screening Bilateral With CAD; Future  -     CBC & Differential; Future  -     Comprehensive Metabolic Panel; Future  -     Lipid Panel With / Chol / HDL Ratio; Future  -     TSH; Future  -     Vitamin D 25 Hydroxy; Future  -     Vitamin B12; Future    Doing well.  Mammogram ordered.  Pap smear UTD.   Tdap today.  Labs ordered.        Return if symptoms worsen or fail to improve, for Annual physical.     Never Smoker   • Smokeless tobacco: Never Used   Substance Use Topics   • Alcohol use: Not Currently     Frequency: Never     Drinks per session: 1 or 2     Binge frequency: Never     Comment: occ   • Drug use: No       Review of Systems   Constitutional: Negative for chills, diaphoresis, fever and unexpected weight change.   HENT: Negative for sore throat.    Eyes: Positive for photophobia. Negative for pain and visual disturbance.   Respiratory: Negative for cough and shortness of breath.    Cardiovascular: Negative for chest pain and leg swelling.   Gastrointestinal: Positive for nausea and vomiting. Negative for abdominal pain, blood in stool and diarrhea.   Genitourinary: Negative for dysuria and frequency.   Musculoskeletal: Negative for back pain and neck stiffness.   Skin: Negative for rash.   Neurological: Positive for headaches. Negative for dizziness, syncope, weakness and numbness.   Hematological: Negative for adenopathy. Does not bruise/bleed easily.   Psychiatric/Behavioral: Negative for dysphoric mood and suicidal ideas.       Physical Exam     ED Triage Vitals [12/07/19 2137]   ED Triage Vitals Group      Temp 98.3 °F (36.8 °C)      Pulse 102      Resp 16      /88      SpO2 99 %      EtCO2 mmHg       Height       Weight       Weight Scale Used       BMI (Calculated)       IBW/kg (Calculated)        Physical Exam   Constitutional: She is oriented to person, place, and time. She appears well-developed and well-nourished. No distress.   HENT:   Mouth/Throat: Oropharynx is clear and moist. No oropharyngeal exudate.   Eyes: Pupils are equal, round, and reactive to light. Conjunctivae and EOM are normal. No scleral icterus.   Fundoscopic exam:       The right eye shows no hemorrhage and no papilledema.        The left eye shows no hemorrhage and no papilledema.   Neck: Neck supple. No JVD present. No tracheal deviation present. No thyromegaly present.   Cardiovascular: Normal rate, regular rhythm,  normal heart sounds and intact distal pulses.   No murmur heard.  Pulmonary/Chest: Effort normal and breath sounds normal. No stridor. No respiratory distress. She has no wheezes. She has no rales.   Abdominal: Soft. Bowel sounds are normal. She exhibits no distension and no mass. There is no tenderness. Musculoskeletal:         General: No tenderness.     Lymphadenopathy:     She has no cervical adenopathy.   Neurological: She is alert and oriented to person, place, and time. She has normal strength. No cranial nerve deficit or sensory deficit. Gait normal.   No pronator drift  No finger to nose or heel to shin dysmetria  No aphasia  No dysarthria    Skin: Skin is dry. No rash noted. She is not diaphoretic. No erythema.   Psychiatric: She has a normal mood and affect.   Nursing note and vitals reviewed.      ED Course     Procedures    Lab Results     No results found for this visit on 12/07/19.    EKG Results       Radiology Results     Imaging Results    None         ED Medication Orders (From admission, onward)    Ordered Start     Status Ordering Provider    12/07/19 2151 12/07/19 2152    ONCE      Discontinued MARIA L WHITMORE    12/07/19 2151 12/07/19 2152  diphenhydrAMINE (BENADRYL) injection 25 mg  ONCE      Last MAR action:  Given MARIA L WHITMORE    12/07/19 2151 12/07/19 2151  dexamethasone (PF) (DECADRON) injection 10 mg  ONCE      Last MAR action:  Given MARIA L WHITMORE               [facility]  Vitals  Vitals:    12/07/19 2137 12/07/19 2139 12/08/19 0000   BP: 145/88 158/89 132/79   Pulse: 102 111 77   Resp: 16 18 18   Temp: 98.3 °F (36.8 °C)     TempSrc: Oral     SpO2: 99% 100% 95%       ED Course  Initial Impression: The pt is a 40 year old female who presents to the ED accompanied by her s/o for a HA present daily for the last 2 weeks. The pt has a hx of COPD, DM, fibromyalgia and migraines, this is consistent with her hx of migraines. Has tried medications at home with no significant  relief. I performed my initial evaluation of the pt. The plan of care for the pt will be for sx control. The pt understands the plan of care. All further questions are answered.     10:48 PM RN update  Staff was able to place IV and she received her medications.     10:56 PM I have rechecked the pt who recently received the medications. I will recheck on the pt shortly. The pt understands the plan of care. All further questions are answered.     11:48 PM I have rechecked the pt who is resting comfortably. She feels better after the medications and her current pain is 3/10. Pt feels comfortable going home. The pt should f/u with her PCP for re-evaluation or if sx persist. She was informed to return to the ED if there are any new or worsening sx. The pt understands the plan of care. All further questions are answered.        MDM  Critical Care time spent on this patient outside of billable procedures:  None    Clinical Impression  ED Diagnosis        Final diagnosis    Nonintractable headache, unspecified chronicity pattern, unspecified headache type     Associated Orders          SERVICE TO INTERNAL MEDICINE           The patient was provided with a recommendation to follow up with a primary care provider and obtain reassessment of his/her blood pressure within three months.    Follow Up:  Radha Agarwal MD  [address]  [address]  [facility] [address]  [phone]    In 2 days       Pt is discharged to home/self care in stable condition.      I have reviewed the information recorded by the scribe for accuracy and agree with its contents.    ____________________________________________________________________    Felicity Polk acting as a scribe for Dr. Steve Fall  Dictation # [id number]  Scribe: Fleicity Fall MD  12/08/19 0052

## 2020-04-25 DIAGNOSIS — G89.29 CHRONIC NECK PAIN: ICD-10-CM

## 2020-04-25 DIAGNOSIS — M54.2 CHRONIC NECK PAIN: ICD-10-CM

## 2020-04-29 RX ORDER — METHOCARBAMOL 750 MG/1
TABLET, FILM COATED ORAL
Qty: 90 TABLET | Refills: 0 | Status: SHIPPED | OUTPATIENT
Start: 2020-04-29 | End: 2020-06-03 | Stop reason: SDUPTHER

## 2020-06-03 DIAGNOSIS — G89.29 CHRONIC NECK PAIN: ICD-10-CM

## 2020-06-03 DIAGNOSIS — M54.2 CHRONIC NECK PAIN: ICD-10-CM

## 2020-06-03 RX ORDER — OMEPRAZOLE 40 MG/1
40 CAPSULE, DELAYED RELEASE ORAL 2 TIMES DAILY
Qty: 180 CAPSULE | Refills: 3 | Status: SHIPPED | OUTPATIENT
Start: 2020-06-03 | End: 2020-10-26

## 2020-06-03 RX ORDER — METHOCARBAMOL 750 MG/1
750 TABLET, FILM COATED ORAL DAILY
Qty: 90 TABLET | Refills: 3 | Status: SHIPPED | OUTPATIENT
Start: 2020-06-03 | End: 2020-10-26 | Stop reason: SDUPTHER

## 2020-06-04 RX ORDER — TRAZODONE HYDROCHLORIDE 50 MG/1
50 TABLET ORAL NIGHTLY PRN
Qty: 90 TABLET | Refills: 1 | Status: SHIPPED | OUTPATIENT
Start: 2020-06-04 | End: 2021-01-07 | Stop reason: SDUPTHER

## 2020-09-14 DIAGNOSIS — Z82.49 FAMILY HISTORY OF DVT: Primary | ICD-10-CM

## 2020-09-21 LAB
APTT SCREEN TO CONFIRM RATIO: 0.99 RATIO (ref 0–1.4)
AT III ACT/NOR PPP CHRO: 111 % (ref 75–135)
AT III AG ACT/NOR PPP IA: 97 % (ref 72–124)
CARDIOLIPIN IGA SER IA-ACNC: <9 APL U/ML (ref 0–11)
CARDIOLIPIN IGG SER IA-ACNC: <9 GPL U/ML (ref 0–14)
CARDIOLIPIN IGM SER IA-ACNC: <9 MPL U/ML (ref 0–12)
CONFIRM APTT/NORMAL: 32.8 SEC (ref 0–55)
F2 GENE MUT ANL BLD/T: NORMAL
F5 GENE MUT ANL BLD/T: NORMAL
LA 2 SCREEN W REFLEX-IMP: NORMAL
PROT C ACT/NOR PPP: 160 % (ref 73–180)
PROT C AG ACT/NOR PPP IA: 126 % (ref 60–150)
PROT S ACT/NOR PPP: 109 % (ref 63–140)
PROT S AG ACT/NOR PPP IA: 102 % (ref 60–150)
PROT S FREE AG ACT/NOR PPP IA: 89 % (ref 57–157)
SCREEN APTT: 41.7 SEC (ref 0–51.9)
SCREEN DRVVT: 31.4 SEC (ref 0–47)
THROMBIN TIME: 17.2 SEC (ref 0–23)

## 2020-10-22 ENCOUNTER — DOCUMENTATION (OUTPATIENT)
Dept: GASTROENTEROLOGY | Facility: CLINIC | Age: 50
End: 2020-10-22

## 2020-10-26 DIAGNOSIS — M54.2 CHRONIC NECK PAIN: ICD-10-CM

## 2020-10-26 DIAGNOSIS — G89.29 CHRONIC NECK PAIN: ICD-10-CM

## 2020-10-26 RX ORDER — PANTOPRAZOLE SODIUM 40 MG/1
40 TABLET, DELAYED RELEASE ORAL 2 TIMES DAILY
Qty: 180 TABLET | Refills: 3 | Status: SHIPPED | OUTPATIENT
Start: 2020-10-26 | End: 2021-10-15 | Stop reason: SDUPTHER

## 2020-10-26 RX ORDER — METHOCARBAMOL 750 MG/1
750 TABLET, FILM COATED ORAL DAILY
Qty: 90 TABLET | Refills: 3 | Status: SHIPPED | OUTPATIENT
Start: 2020-10-26 | End: 2022-01-06 | Stop reason: SDUPTHER

## 2020-11-04 ENCOUNTER — RESULTS ENCOUNTER (OUTPATIENT)
Dept: INTERNAL MEDICINE | Facility: CLINIC | Age: 50
End: 2020-11-04

## 2020-11-04 DIAGNOSIS — Z00.00 ROUTINE HEALTH MAINTENANCE: Primary | ICD-10-CM

## 2020-11-04 DIAGNOSIS — Z00.00 ROUTINE HEALTH MAINTENANCE: ICD-10-CM

## 2020-11-05 ENCOUNTER — LAB (OUTPATIENT)
Dept: LAB | Facility: HOSPITAL | Age: 50
End: 2020-11-05

## 2020-11-05 LAB
25(OH)D3 SERPL-MCNC: 47.7 NG/ML (ref 30–100)
ALBUMIN SERPL-MCNC: 4.5 G/DL (ref 3.5–5.2)
ALBUMIN/GLOB SERPL: 1.5 G/DL
ALP SERPL-CCNC: 91 U/L (ref 39–117)
ALT SERPL W P-5'-P-CCNC: 65 U/L (ref 1–33)
ANION GAP SERPL CALCULATED.3IONS-SCNC: 7.9 MMOL/L (ref 5–15)
AST SERPL-CCNC: 36 U/L (ref 1–32)
BASOPHILS # BLD AUTO: 0.07 10*3/MM3 (ref 0–0.2)
BASOPHILS NFR BLD AUTO: 1.2 % (ref 0–1.5)
BILIRUB SERPL-MCNC: 0.5 MG/DL (ref 0–1.2)
BUN SERPL-MCNC: 17 MG/DL (ref 6–20)
BUN/CREAT SERPL: 23.3 (ref 7–25)
CALCIUM SPEC-SCNC: 9.6 MG/DL (ref 8.6–10.5)
CHLORIDE SERPL-SCNC: 103 MMOL/L (ref 98–107)
CHOLEST SERPL-MCNC: 246 MG/DL (ref 0–200)
CO2 SERPL-SCNC: 29.1 MMOL/L (ref 22–29)
CREAT SERPL-MCNC: 0.73 MG/DL (ref 0.57–1)
DEPRECATED RDW RBC AUTO: 39.8 FL (ref 37–54)
EOSINOPHIL # BLD AUTO: 0.13 10*3/MM3 (ref 0–0.4)
EOSINOPHIL NFR BLD AUTO: 2.2 % (ref 0.3–6.2)
ERYTHROCYTE [DISTWIDTH] IN BLOOD BY AUTOMATED COUNT: 12 % (ref 12.3–15.4)
GFR SERPL CREATININE-BSD FRML MDRD: 85 ML/MIN/1.73
GLOBULIN UR ELPH-MCNC: 3.1 GM/DL
GLUCOSE SERPL-MCNC: 96 MG/DL (ref 65–99)
HBA1C MFR BLD: 5.69 % (ref 4.8–5.6)
HCT VFR BLD AUTO: 41.8 % (ref 34–46.6)
HDLC SERPL QL: 3.46
HDLC SERPL-MCNC: 71 MG/DL (ref 40–60)
HGB BLD-MCNC: 14.2 G/DL (ref 12–15.9)
IMM GRANULOCYTES # BLD AUTO: 0.02 10*3/MM3 (ref 0–0.05)
IMM GRANULOCYTES NFR BLD AUTO: 0.3 % (ref 0–0.5)
LDLC SERPL CALC-MCNC: 161 MG/DL (ref 0–100)
LYMPHOCYTES # BLD AUTO: 2.12 10*3/MM3 (ref 0.7–3.1)
LYMPHOCYTES NFR BLD AUTO: 36.5 % (ref 19.6–45.3)
MCH RBC QN AUTO: 30.9 PG (ref 26.6–33)
MCHC RBC AUTO-ENTMCNC: 34 G/DL (ref 31.5–35.7)
MCV RBC AUTO: 90.9 FL (ref 79–97)
MONOCYTES # BLD AUTO: 0.41 10*3/MM3 (ref 0.1–0.9)
MONOCYTES NFR BLD AUTO: 7.1 % (ref 5–12)
NEUTROPHILS NFR BLD AUTO: 3.06 10*3/MM3 (ref 1.7–7)
NEUTROPHILS NFR BLD AUTO: 52.7 % (ref 42.7–76)
NRBC BLD AUTO-RTO: 0 /100 WBC (ref 0–0.2)
PLATELET # BLD AUTO: 276 10*3/MM3 (ref 140–450)
PMV BLD AUTO: 10.5 FL (ref 6–12)
POTASSIUM SERPL-SCNC: 4.9 MMOL/L (ref 3.5–5.2)
PROT SERPL-MCNC: 7.6 G/DL (ref 6–8.5)
RBC # BLD AUTO: 4.6 10*6/MM3 (ref 3.77–5.28)
SODIUM SERPL-SCNC: 140 MMOL/L (ref 136–145)
T4 FREE SERPL-MCNC: 1.44 NG/DL (ref 0.93–1.7)
TRIGL SERPL-MCNC: 85 MG/DL (ref 0–150)
TSH SERPL DL<=0.05 MIU/L-ACNC: 3.19 UIU/ML (ref 0.27–4.2)
VIT B12 BLD-MCNC: 1032 PG/ML (ref 211–946)
VLDLC SERPL-MCNC: 14 MG/DL (ref 5–40)
WBC # BLD AUTO: 5.81 10*3/MM3 (ref 3.4–10.8)

## 2020-11-05 PROCEDURE — 36415 COLL VENOUS BLD VENIPUNCTURE: CPT | Performed by: FAMILY MEDICINE

## 2020-11-05 PROCEDURE — 83036 HEMOGLOBIN GLYCOSYLATED A1C: CPT | Performed by: FAMILY MEDICINE

## 2020-11-05 PROCEDURE — 80061 LIPID PANEL: CPT | Performed by: FAMILY MEDICINE

## 2020-11-05 PROCEDURE — 84443 ASSAY THYROID STIM HORMONE: CPT | Performed by: FAMILY MEDICINE

## 2020-11-05 PROCEDURE — 82607 VITAMIN B-12: CPT | Performed by: FAMILY MEDICINE

## 2020-11-05 PROCEDURE — 82306 VITAMIN D 25 HYDROXY: CPT | Performed by: FAMILY MEDICINE

## 2020-11-05 PROCEDURE — 84439 ASSAY OF FREE THYROXINE: CPT | Performed by: FAMILY MEDICINE

## 2020-11-05 PROCEDURE — 85025 COMPLETE CBC W/AUTO DIFF WBC: CPT | Performed by: FAMILY MEDICINE

## 2020-11-05 PROCEDURE — 80053 COMPREHEN METABOLIC PANEL: CPT | Performed by: FAMILY MEDICINE

## 2020-11-09 ENCOUNTER — DOCUMENTATION (OUTPATIENT)
Dept: GASTROENTEROLOGY | Facility: CLINIC | Age: 50
End: 2020-11-09

## 2020-11-09 ENCOUNTER — OFFICE VISIT (OUTPATIENT)
Dept: INTERNAL MEDICINE | Facility: CLINIC | Age: 50
End: 2020-11-09

## 2020-11-09 VITALS
DIASTOLIC BLOOD PRESSURE: 86 MMHG | BODY MASS INDEX: 24.79 KG/M2 | TEMPERATURE: 96.6 F | RESPIRATION RATE: 16 BRPM | HEIGHT: 72 IN | OXYGEN SATURATION: 100 % | SYSTOLIC BLOOD PRESSURE: 138 MMHG | HEART RATE: 86 BPM | WEIGHT: 183 LBS

## 2020-11-09 DIAGNOSIS — R74.01 ELEVATED TRANSAMINASE LEVEL: ICD-10-CM

## 2020-11-09 DIAGNOSIS — Z12.31 ENCOUNTER FOR SCREENING MAMMOGRAM FOR BREAST CANCER: ICD-10-CM

## 2020-11-09 DIAGNOSIS — R23.2 HOT FLASHES: ICD-10-CM

## 2020-11-09 DIAGNOSIS — Z00.00 ANNUAL PHYSICAL EXAM: Primary | ICD-10-CM

## 2020-11-09 DIAGNOSIS — Z12.11 SCREEN FOR COLON CANCER: ICD-10-CM

## 2020-11-09 DIAGNOSIS — Z00.00 ROUTINE ADULT HEALTH MAINTENANCE: ICD-10-CM

## 2020-11-09 PROCEDURE — 99396 PREV VISIT EST AGE 40-64: CPT | Performed by: FAMILY MEDICINE

## 2020-11-09 RX ORDER — FAMOTIDINE 40 MG/1
40 TABLET, FILM COATED ORAL 2 TIMES DAILY
Qty: 180 TABLET | Refills: 3 | Status: SHIPPED | OUTPATIENT
Start: 2020-11-09 | End: 2021-10-15 | Stop reason: SDUPTHER

## 2020-11-09 RX ORDER — PAROXETINE 10 MG/1
10 TABLET, FILM COATED ORAL EVERY MORNING
Qty: 30 TABLET | Refills: 5 | Status: SHIPPED | OUTPATIENT
Start: 2020-11-09 | End: 2021-02-10 | Stop reason: SDUPTHER

## 2020-11-09 NOTE — PROGRESS NOTES
Patient Name: Nadja Saez is a 50 y.o. female presenting for Annual Exam (Normal Pap 2019 per GYN/mammo/colon ordered)      Well Adult Physical   Patient here for a comprehensive physical exam.The patient reports no problems    Do you take any herbs or supplements that were not prescribed by a doctor? no   Are you taking calcium supplements? yes   Are you taking aspirin daily? no     History:  Any STD's in the past? none    Nadja Shipley 50 y.o. female who presents for an Annual Wellness Visit.  she has a history of   Patient Active Problem List   Diagnosis   • Irritable bowel syndrome   • Chronic neck pain   • Rosacea   • Insomnia   • Annual physical exam   • Hot flashes   .  she has been doing well with new interval problems.      Health Habits:  Dental Exam. up to date  Eye Exam. not up to date - no problems  Exercise: 6 times/week.  Current exercise activities include: walking      The following portions of the patient's history were reviewed and updated as appropriate: allergies, current medications, past family history, past medical history, past social history, past surgical history and problem list.    Review of Systems   Constitutional: Negative.    HENT: Negative.    Eyes: Negative.    Respiratory: Negative.    Cardiovascular: Negative.    Gastrointestinal: Negative.    Endocrine: Negative.    Genitourinary: Negative.    Musculoskeletal: Negative.    Skin: Negative.    Allergic/Immunologic: Negative.    Neurological: Negative.    Hematological: Negative.    Psychiatric/Behavioral: Negative.        Patient has no known allergies.      Current Outpatient Medications:   •  amitriptyline (ELAVIL) 50 MG tablet, Take 1 tablet by mouth every night at bedtime., Disp: 90 tablet, Rfl: 1  •  baclofen (LIORESAL) 20 MG tablet, Take 1 tablet by mouth Every Night., Disp: 90 tablet, Rfl: 3  •  Calcium Citrate 1040 MG tablet, Take 1 tablet by mouth Daily., Disp: , Rfl:   •  cetirizine (zyrTEC) 10 MG  tablet, Take 10 mg by mouth Daily., Disp: , Rfl:   •  Crisaborole 2 % ointment, Apply 1 application topically to the appropriate area as directed 2 (two) times a day., Disp: 60 g, Rfl: 4  •  desoximetasone (TOPICORT) 0.25 % cream, Apply  topically to the appropriate area as directed 2 (Two) Times a Day., Disp: 60 g, Rfl: 0  •  fluticasone (FLONASE) 50 MCG/ACT nasal spray, 2 sprays into each nostril daily. Administer 2 sprays in each nostril for each dose., Disp: 3 each, Rfl: 3  •  levothyroxine (SYNTHROID, LEVOTHROID) 75 MCG tablet, Take 1 tablet by mouth Daily., Disp: 90 tablet, Rfl: 3  •  magnesium chloride ER (MAG-DELAY) 535 (64 Mg) MG CR tablet, Take 1 tablet by mouth Daily., Disp: , Rfl:   •  meloxicam (MOBIC) 15 MG tablet, Take 1 tablet by mouth Daily., Disp: 90 tablet, Rfl: 3  •  methocarbamol (ROBAXIN) 750 MG tablet, Take 1 tablet by mouth Daily., Disp: 90 tablet, Rfl: 3  •  Multiple Vitamins-Minerals (MULTIVITAMIN ADULT PO), Take 1 tablet by mouth Daily., Disp: , Rfl:   •  olopatadine (PATANOL) 0.1 % ophthalmic solution, Administer 1 drop to both eyes 2 (Two) Times a Day As Needed., Disp: 5 mL, Rfl: 11  •  pantoprazole (PROTONIX) 40 MG EC tablet, Take 1 tablet by mouth 2 (two) times a day., Disp: 180 tablet, Rfl: 3  •  Probiotic Product (PROBIOTIC ADVANCED PO), Take 1 capsule by mouth Daily., Disp: , Rfl:   •  progesterone (Prometrium) 100 MG capsule, Take 1 capsule by mouth Daily., Disp: 90 capsule, Rfl: 3  •  Simethicone 250 MG capsule, Take 4 capsules by mouth Daily., Disp: , Rfl:   •  traZODone (DESYREL) 50 MG tablet, Take 1 tablet by mouth At Night As Needed., Disp: 90 tablet, Rfl: 1  •  famotidine (PEPCID) 40 MG tablet, Take 1 tablet by mouth 2 (Two) Times a Day., Disp: 180 tablet, Rfl: 3  •  pantoprazole (PROTONIX) 40 MG EC tablet, Take 1 tablet by mouth 2 (two) times a day., Disp: 180 tablet, Rfl: 0  •  PARoxetine (PAXIL) 10 MG tablet, Take 1 tablet by mouth Every Morning., Disp: 30 tablet, Rfl:  "5    OBJECTIVE    /86 (BP Location: Left arm, Patient Position: Sitting, Cuff Size: Large Adult)   Pulse 86   Temp 96.6 °F (35.9 °C) (Temporal)   Resp 16   Ht 182.9 cm (72\")   Wt 83 kg (183 lb)   SpO2 100%   BMI 24.82 kg/m²     Physical Exam  Vitals signs and nursing note reviewed.   Constitutional:       Appearance: Normal appearance. She is well-developed.   HENT:      Head: Normocephalic and atraumatic.      Right Ear: External ear normal.      Left Ear: External ear normal.      Nose: Nose normal.   Eyes:      General: No scleral icterus.     Extraocular Movements: Extraocular movements intact.      Conjunctiva/sclera: Conjunctivae normal.      Pupils: Pupils are equal, round, and reactive to light.   Neck:      Musculoskeletal: Normal range of motion and neck supple.      Thyroid: No thyromegaly.   Cardiovascular:      Rate and Rhythm: Normal rate and regular rhythm.      Heart sounds: Normal heart sounds. No murmur. No friction rub. No gallop.    Pulmonary:      Effort: Pulmonary effort is normal. No respiratory distress.      Breath sounds: Normal breath sounds. No wheezing or rales.   Abdominal:      General: Bowel sounds are normal.      Palpations: Abdomen is soft.   Musculoskeletal:         General: No deformity.      Right lower leg: No edema.      Left lower leg: No edema.   Lymphadenopathy:      Cervical: No cervical adenopathy.   Skin:     General: Skin is warm and dry.      Findings: No rash.   Neurological:      General: No focal deficit present.      Mental Status: She is alert and oriented to person, place, and time.      Cranial Nerves: No cranial nerve deficit.      Motor: No abnormal muscle tone.      Coordination: Coordination normal.      Deep Tendon Reflexes: Reflexes are normal and symmetric. Reflexes normal.   Psychiatric:         Mood and Affect: Mood normal.         Behavior: Behavior normal.         Thought Content: Thought content normal.         Judgment: Judgment normal. "           ASSESSMENT AND PLAN  Update vaccines if indicated.    continue current medications, continue current healthy lifestyle patterns and return for routine annual checkups    Diagnoses and all orders for this visit:    1. Annual physical exam (Primary)    2. Encounter for screening mammogram for breast cancer  -     Mammo Screening Bilateral With CAD; Future    3. Screen for colon cancer  -     Ambulatory Referral For Screening Colonoscopy    4. Routine adult health maintenance  -     POCT urinalysis dipstick, automated    5. Elevated transaminase level  -     US Liver; Future    6. Hot flashes  -     PARoxetine (PAXIL) 10 MG tablet; Take 1 tablet by mouth Every Morning.  Dispense: 30 tablet; Refill: 5    Other orders  -     famotidine (PEPCID) 40 MG tablet; Take 1 tablet by mouth 2 (Two) Times a Day.  Dispense: 180 tablet; Refill: 3      Tdap and flu vaccine UTD.  Mammogram and colonoscopy ordered.  Pap smear UTD.    Trial of paroxetine for hot flashes.    Liver u/s ordered for elevated transaminase level.    [unfilled]    Return in about 1 year (around 11/9/2021).

## 2020-11-10 ENCOUNTER — TRANSCRIBE ORDERS (OUTPATIENT)
Dept: ADMINISTRATIVE | Facility: HOSPITAL | Age: 50
End: 2020-11-10

## 2020-11-10 DIAGNOSIS — Z12.31 SCREENING MAMMOGRAM, ENCOUNTER FOR: Primary | ICD-10-CM

## 2020-12-11 ENCOUNTER — HOSPITAL ENCOUNTER (OUTPATIENT)
Dept: MAMMOGRAPHY | Facility: HOSPITAL | Age: 50
Discharge: HOME | End: 2020-12-11
Admitting: FAMILY MEDICINE

## 2020-12-11 ENCOUNTER — HOSPITAL ENCOUNTER (OUTPATIENT)
Dept: ULTRASOUND IMAGING | Facility: HOSPITAL | Age: 50
Discharge: HOME | End: 2020-12-11
Admitting: FAMILY MEDICINE

## 2020-12-11 DIAGNOSIS — R74.01 ELEVATED TRANSAMINASE LEVEL: ICD-10-CM

## 2020-12-11 DIAGNOSIS — Z12.31 SCREENING MAMMOGRAM, ENCOUNTER FOR: ICD-10-CM

## 2020-12-11 PROCEDURE — 76705 ECHO EXAM OF ABDOMEN: CPT

## 2020-12-11 PROCEDURE — 77067 SCR MAMMO BI INCL CAD: CPT

## 2020-12-11 PROCEDURE — 77063 BREAST TOMOSYNTHESIS BI: CPT

## 2021-01-07 DIAGNOSIS — E03.9 HYPOTHYROIDISM, UNSPECIFIED TYPE: ICD-10-CM

## 2021-01-07 DIAGNOSIS — K58.0 IRRITABLE BOWEL SYNDROME WITH DIARRHEA: ICD-10-CM

## 2021-01-08 RX ORDER — TRAZODONE HYDROCHLORIDE 50 MG/1
50 TABLET ORAL NIGHTLY PRN
Qty: 90 TABLET | Refills: 1 | Status: SHIPPED | OUTPATIENT
Start: 2021-01-08 | End: 2021-06-24 | Stop reason: SDUPTHER

## 2021-01-08 RX ORDER — AMITRIPTYLINE HYDROCHLORIDE 50 MG/1
50 TABLET, FILM COATED ORAL
Qty: 90 TABLET | Refills: 1 | Status: SHIPPED | OUTPATIENT
Start: 2021-01-08 | End: 2022-08-23 | Stop reason: SDUPTHER

## 2021-01-08 RX ORDER — LEVOTHYROXINE SODIUM 0.07 MG/1
75 TABLET ORAL DAILY
Qty: 90 TABLET | Refills: 3 | Status: SHIPPED | OUTPATIENT
Start: 2021-01-08 | End: 2022-01-06 | Stop reason: SDUPTHER

## 2021-02-01 RX ORDER — DICYCLOMINE HCL 20 MG
20 TABLET ORAL EVERY 6 HOURS
Qty: 60 TABLET | Refills: 3 | Status: SHIPPED | OUTPATIENT
Start: 2021-02-01 | End: 2022-08-23 | Stop reason: SDUPTHER

## 2021-02-10 ENCOUNTER — TELEPHONE (OUTPATIENT)
Dept: GASTROENTEROLOGY | Facility: CLINIC | Age: 51
End: 2021-02-10

## 2021-02-10 DIAGNOSIS — R23.2 HOT FLASHES: ICD-10-CM

## 2021-02-10 RX ORDER — PAROXETINE 10 MG/1
10 TABLET, FILM COATED ORAL EVERY MORNING
Qty: 30 TABLET | Refills: 5 | Status: SHIPPED | OUTPATIENT
Start: 2021-02-10 | End: 2021-09-16 | Stop reason: SDUPTHER

## 2021-04-16 DIAGNOSIS — Z83.49 FAMILY HISTORY OF MTHFR DEFICIENCY: Primary | ICD-10-CM

## 2021-04-22 LAB — MTHFR GENE MUT ANL BLD/T: NORMAL

## 2021-04-29 ENCOUNTER — PREP FOR SURGERY (OUTPATIENT)
Dept: OTHER | Facility: HOSPITAL | Age: 51
End: 2021-04-29

## 2021-04-29 DIAGNOSIS — Z12.11 SCREENING FOR COLON CANCER: Primary | ICD-10-CM

## 2021-04-30 PROBLEM — Z12.11 SCREENING FOR COLON CANCER: Status: ACTIVE | Noted: 2021-04-30

## 2021-06-04 ENCOUNTER — ANESTHESIA EVENT (OUTPATIENT)
Dept: PERIOP | Facility: HOSPITAL | Age: 51
End: 2021-06-04

## 2021-06-07 ENCOUNTER — HOSPITAL ENCOUNTER (OUTPATIENT)
Facility: HOSPITAL | Age: 51
Setting detail: HOSPITAL OUTPATIENT SURGERY
Discharge: HOME | End: 2021-06-07
Attending: INTERNAL MEDICINE | Admitting: INTERNAL MEDICINE

## 2021-06-07 ENCOUNTER — ANESTHESIA (OUTPATIENT)
Dept: PERIOP | Facility: HOSPITAL | Age: 51
End: 2021-06-07

## 2021-06-07 VITALS
DIASTOLIC BLOOD PRESSURE: 92 MMHG | WEIGHT: 189 LBS | TEMPERATURE: 98.4 F | HEART RATE: 80 BPM | OXYGEN SATURATION: 99 % | BODY MASS INDEX: 25.63 KG/M2 | RESPIRATION RATE: 16 BRPM | SYSTOLIC BLOOD PRESSURE: 158 MMHG

## 2021-06-07 DIAGNOSIS — Z12.11 SCREENING FOR COLON CANCER: ICD-10-CM

## 2021-06-07 PROCEDURE — 25010000003 LIDOCAINE 1 % SOLUTION: Performed by: NURSE ANESTHETIST, CERTIFIED REGISTERED

## 2021-06-07 PROCEDURE — 45380 COLONOSCOPY AND BIOPSY: CPT | Performed by: INTERNAL MEDICINE

## 2021-06-07 PROCEDURE — 45385 COLONOSCOPY W/LESION REMOVAL: CPT | Performed by: INTERNAL MEDICINE

## 2021-06-07 PROCEDURE — 88305 TISSUE EXAM BY PATHOLOGIST: CPT | Performed by: INTERNAL MEDICINE

## 2021-06-07 PROCEDURE — 25010000002 PROPOFOL 10 MG/ML EMULSION: Performed by: NURSE ANESTHETIST, CERTIFIED REGISTERED

## 2021-06-07 RX ORDER — PROPOFOL 10 MG/ML
VIAL (ML) INTRAVENOUS CONTINUOUS PRN
Status: DISCONTINUED | OUTPATIENT
Start: 2021-06-07 | End: 2021-06-07 | Stop reason: SURG

## 2021-06-07 RX ORDER — SODIUM CHLORIDE, SODIUM LACTATE, POTASSIUM CHLORIDE, CALCIUM CHLORIDE 600; 310; 30; 20 MG/100ML; MG/100ML; MG/100ML; MG/100ML
INJECTION, SOLUTION INTRAVENOUS CONTINUOUS PRN
Status: DISCONTINUED | OUTPATIENT
Start: 2021-06-07 | End: 2021-06-07 | Stop reason: SURG

## 2021-06-07 RX ORDER — PROPOFOL 10 MG/ML
VIAL (ML) INTRAVENOUS AS NEEDED
Status: DISCONTINUED | OUTPATIENT
Start: 2021-06-07 | End: 2021-06-07 | Stop reason: SURG

## 2021-06-07 RX ORDER — MAGNESIUM HYDROXIDE 1200 MG/15ML
LIQUID ORAL AS NEEDED
Status: DISCONTINUED | OUTPATIENT
Start: 2021-06-07 | End: 2021-06-07 | Stop reason: HOSPADM

## 2021-06-07 RX ORDER — LIDOCAINE HYDROCHLORIDE 10 MG/ML
INJECTION, SOLUTION INFILTRATION; PERINEURAL AS NEEDED
Status: DISCONTINUED | OUTPATIENT
Start: 2021-06-07 | End: 2021-06-07 | Stop reason: SURG

## 2021-06-07 RX ADMIN — PROPOFOL 30 MG: 10 INJECTION, EMULSION INTRAVENOUS at 10:13

## 2021-06-07 RX ADMIN — PROPOFOL 50 MG: 10 INJECTION, EMULSION INTRAVENOUS at 09:53

## 2021-06-07 RX ADMIN — PROPOFOL 30 MG: 10 INJECTION, EMULSION INTRAVENOUS at 10:23

## 2021-06-07 RX ADMIN — PROPOFOL 50 MG: 10 INJECTION, EMULSION INTRAVENOUS at 09:44

## 2021-06-07 RX ADMIN — SODIUM CHLORIDE, POTASSIUM CHLORIDE, SODIUM LACTATE AND CALCIUM CHLORIDE: 600; 310; 30; 20 INJECTION, SOLUTION INTRAVENOUS at 09:39

## 2021-06-07 RX ADMIN — LIDOCAINE HYDROCHLORIDE 50 MG: 10 INJECTION, SOLUTION INFILTRATION; PERINEURAL at 09:42

## 2021-06-07 RX ADMIN — PROPOFOL 50 MCG/KG/MIN: 10 INJECTION, EMULSION INTRAVENOUS at 09:54

## 2021-06-07 RX ADMIN — PROPOFOL 40 MG: 10 INJECTION, EMULSION INTRAVENOUS at 09:46

## 2021-06-07 RX ADMIN — PROPOFOL 30 MG: 10 INJECTION, EMULSION INTRAVENOUS at 10:17

## 2021-06-07 RX ADMIN — PROPOFOL 50 MG: 10 INJECTION, EMULSION INTRAVENOUS at 10:02

## 2021-06-07 RX ADMIN — PROPOFOL 50 MG: 10 INJECTION, EMULSION INTRAVENOUS at 10:08

## 2021-06-07 RX ADMIN — PROPOFOL 30 MG: 10 INJECTION, EMULSION INTRAVENOUS at 10:28

## 2021-06-07 RX ADMIN — PROPOFOL 30 MG: 10 INJECTION, EMULSION INTRAVENOUS at 09:57

## 2021-06-07 NOTE — ANESTHESIA PREPROCEDURE EVALUATION
Anesthesia Evaluation     Patient summary reviewed and Nursing notes reviewed   history of anesthetic complications: prolonged sedation  NPO Solid Status: > 8 hours  NPO Liquid Status: > 8 hours           Airway   Mallampati: I  TM distance: >3 FB  Neck ROM: full  No difficulty expected  Dental - normal exam     Pulmonary - normal exam    breath sounds clear to auscultation  (+) recent URI (finished zpack over 1 week ago) resolved,   Cardiovascular - normal exam    ECG reviewed  Rhythm: regular  Rate: normal      ROS comment: POTS syndrome    Neuro/Psych- negative ROS  GI/Hepatic/Renal/Endo    (+)  GERD well controlled,  thyroid problem hypothyroidism    Musculoskeletal     Abdominal  - normal exam   Substance History - negative use     OB/GYN          Other - negative ROS                     Anesthesia Plan    ASA 2     MAC       Anesthetic plan, all risks, benefits, and alternatives have been provided, discussed and informed consent has been obtained with: patient.  Use of blood products discussed with patient  Consented to blood products.

## 2021-06-07 NOTE — OP NOTE
COLONOSCOPY WITH POLYPECTOMY  Procedure Report    Patient Name:  Nadja Shipley  YOB: 1970    Date of Surgery:  6/7/2021     Indications:  Screening for colon cancer [Z12.11]      Pre-op Diagnosis:   Screening for colon cancer [Z12.11]    Post-Op Diagnosis Codes:     * Screening for colon cancer [Z12.11]     * Colon polyp [K63.5]         Procedure/CPT® Codes:      Procedure(s):  COLONOSCOPY WITH POLYPECTOMY    Staff:  Surgeon(s):  Rey Fishman MD         Anesthesia: Monitored Anesthesia Care    Estimated Blood Loss: none    Specimens:   ID Type Source Tests Collected by Time   A (Not marked as sent) : cecal polyp x1 Polyp Large Intestine, Cecum TISSUE PATHOLOGY EXAM Rey Fishman MD 6/7/2021 1008   B (Not marked as sent) : transverse polyp Polyp Large Intestine, Transverse Colon TISSUE PATHOLOGY EXAM Rey Fishman MD 6/7/2021 1019   C (Not marked as sent) : sigmoid polyp x1 Polyp Large Intestine, Sigmoid Colon TISSUE PATHOLOGY EXAM Rey Fishman MD 6/7/2021 1023       Implants:    Nothing was implanted during the procedure      Description of Procedure: After having signed informed consent, she was brought to the endoscopy suite and placed in the left lateral decubitus position and given her IV sedation. Rectal exam revealed single external skin tag but normal anal tone. No rectal mass. The scope was introduced in the rectum and advanced under direct visualization with ease through the rectum, sigmoid colon, descending colon, to and around the splenic flexure, reduced and advanced through the transverse colon with some looping to and around the hepatic flexure. The scope was reduced and using abdominal splinting, advanced through the ascending colon into the cecum. The cecum was identified by appendiceal orifice and ileocecal valve. Distal to the ileocecal valve was a ball shaped 5 mm polyp that was biopsied and felt to be completely removed. The  ileocecal valve was intubated. The terminal ileum was normal appearing. The scope was withdrawn back in the ascending colon, withdrawn slowly, examined the colon in the circumferential fashion. There were no mucosal lesions noted throughout the ascending colon or hepatic flexure or proximal transverse colon. In the distal transverse colon there was a sessile possible serrated adenoma that was noted. It was 8 x 10 mm. It was removed with cold snare technique and sent separately as transverse colon polyp. The scope was withdrawn around the splenic flexure through the descending colon into the sigmoid colon. In the sigmoid colon, there was a sessile 4 x 7 mm polyp that was removed with cold snare technique as well and sent separately as sigmoid colon polyp. The scope was withdrawn. No further mucosal lesions were noted and no diverticula were encountered in the rectum the scope was retroflexed revealing intact dentate line and no mucosal lesions. The scope was deretroflexed and withdrawn. The patient tolerated the procedure very well.          Findings: Colon to TI fair Prep  Polyps-3-Cold Snare/Biopsy     Complications: None    Recommendations: Results to be called.      Rey Fishman MD     Date: 6/7/2021  Time: 10:35 EDT

## 2021-06-07 NOTE — BRIEF OP NOTE
COLONOSCOPY WITH POLYPECTOMY  Progress Note    Nadja Shipley  6/7/2021    Pre-op Diagnosis:   Screening for colon cancer [Z12.11]       Post-Op Diagnosis Codes:     * Screening for colon cancer [Z12.11]     * Colon polyp [K63.5]    Procedure/CPT® Codes:        Procedure(s):  COLONOSCOPY WITH POLYPECTOMY    Surgeon(s):  Rey Fishman MD    Anesthesia: Monitored Anesthesia Care    Staff:   Circulator: Samina Bergman RN  Scrub Person: Cindy Burch         Estimated Blood Loss: none    Urine Voided: * No values recorded between 6/7/2021  9:39 AM and 6/7/2021 10:30 AM *    Specimens:                Specimens     ID Source Type Tests Collected By Collected At Frozen?    A Large Intestine, Cecum Polyp · TISSUE PATHOLOGY EXAM   Rey Fishman MD 6/7/21 1008     Description: cecal polyp x1    This specimen was not marked as sent.    B Large Intestine, Transverse Colon Polyp · TISSUE PATHOLOGY EXAM   Rey Fishman MD 6/7/21 1019     Description: transverse polyp    Comment: Trap 1 cold snare    This specimen was not marked as sent.    C Large Intestine, Sigmoid Colon Polyp · TISSUE PATHOLOGY EXAM   Rey Fishman MD 6/7/21 1023     Description: sigmoid polyp x1    Comment: Trap 2, cold snare    This specimen was not marked as sent.                Drains: * No LDAs found *    Findings: Colon to TI fair Prep  Polyps-3-Cold Snare/Biopsy    Complications: None          Rey Fishman MD     Date: 6/7/2021  Time: 10:34 EDT

## 2021-06-07 NOTE — ANESTHESIA POSTPROCEDURE EVALUATION
Patient: Nadja Shipley    Procedure Summary     Date: 06/07/21 Room / Location: [facility] ENDOSCOPY 1 /  [facility]    Anesthesia Start: 0940 Anesthesia Stop: 1032    Procedure: COLONOSCOPY WITH POLYPECTOMY (N/A ) Diagnosis:       Screening for colon cancer      Colon polyp      (Screening for colon cancer [Z12.11])    Surgeons: Rey Fishman MD Provider: Gustavo Branch CRNA    Anesthesia Type: MAC ASA Status: 2          Anesthesia Type: MAC    Vitals  Vitals Value Taken Time   /90 06/07/21 1036   Temp 98.4 °F (36.9 °C) 06/07/21 1036   Pulse 66 06/07/21 1036   Resp 14 06/07/21 1036   SpO2 100 % 06/07/21 1036           Post Anesthesia Care and Evaluation    Patient location during evaluation: PHASE II  Patient participation: complete - patient participated  Level of consciousness: awake and alert  Pain score: 0  Pain management: adequate  Airway patency: patent  Anesthetic complications: No anesthetic complications  PONV Status: none  Cardiovascular status: acceptable  Respiratory status: acceptable  Hydration status: acceptable

## 2021-06-07 NOTE — H&P
Patient Care Team:  Keara Craig MD as PCP - General  Keara Craig MD as PCP - Family Medicine    CHIEF COMPLAINT: Screening CRC    HISTORY OF PRESENT ILLNESS:  Last colon was 1995    Past Medical History:   Diagnosis Date   • Disease of thyroid gland    • GERD (gastroesophageal reflux disease)    • Irritable bowel syndrome    • Muscle spasms of both lower extremities    • POTS (postural orthostatic tachycardia syndrome)      Past Surgical History:   Procedure Laterality Date   • ANKLE ARTHROSCOPY Right    • CHOLECYSTECTOMY     • COCCYGECTOMY     • TUBAL ABDOMINAL LIGATION       Family History   Problem Relation Age of Onset   • Lung cancer Mother    • Diabetes type II Brother    • Breast cancer Neg Hx      Social History     Tobacco Use   • Smoking status: Never Smoker   Substance Use Topics   • Alcohol use: No   • Drug use: No     Medications Prior to Admission   Medication Sig Dispense Refill Last Dose   • albuterol sulfate  (90 Base) MCG/ACT inhaler Inhale 2 puffs by mouth into the lungs every 4 hours as needed for wheezing or coughing. 8.5 g 0 6/6/2021 at 1200   • amitriptyline (ELAVIL) 50 MG tablet Take 1 tablet by mouth every night at bedtime. 90 tablet 1 6/6/2021 at 2000   • baclofen (LIORESAL) 20 MG tablet Take 1 tablet by mouth Every Night. 90 tablet 3 6/6/2021 at 2100   • Calcium Citrate 1040 MG tablet Take 1 tablet by mouth Daily.   6/6/2021 at 1500   • cetirizine (zyrTEC) 10 MG tablet Take 10 mg by mouth Daily.   6/6/2021 at 1200   • Crisaborole 2 % ointment Apply 1 application topically to the appropriate area as directed 2 (two) times a day. 60 g 4 6/7/2021 at 0700   • famotidine (PEPCID) 40 MG tablet Take 1 tablet by mouth 2 (Two) Times a Day. 180 tablet 3 6/6/2021 at 1200   • fluticasone (FLONASE) 50 MCG/ACT nasal spray 2 sprays into each nostril daily. Administer 2 sprays in each nostril for each dose. 3 each 3 6/6/2021 at 1200   • levothyroxine (SYNTHROID, LEVOTHROID) 75  MCG tablet Take 1 tablet by mouth Daily. 90 tablet 3 6/7/2021 at 700   • magnesium chloride ER (MAG-DELAY) 535 (64 Mg) MG CR tablet Take 1 tablet by mouth Daily.   6/6/2021 at 1200   • meloxicam (MOBIC) 15 MG tablet Take 1 tablet by mouth Daily. 90 tablet 3 Past Month at Unknown time   • methocarbamol (ROBAXIN) 750 MG tablet Take 1 tablet by mouth Daily. 90 tablet 3 6/6/2021 at 2100   • Multiple Vitamins-Minerals (MULTIVITAMIN ADULT PO) Take 1 tablet by mouth Daily.   6/6/2021 at 1500   • olopatadine (PATANOL) 0.1 % ophthalmic solution Administer 1 drop to both eyes 2 (Two) Times a Day As Needed. 5 mL 11 6/6/2021 at 1200   • pantoprazole (PROTONIX) 40 MG EC tablet Take 1 tablet by mouth 2 (two) times a day. 180 tablet 3 6/6/2021 at 1200   • PARoxetine (PAXIL) 10 MG tablet Take 1 tablet by mouth Every Morning. 30 tablet 5 6/6/2021 at 2100   • Probiotic Product (PROBIOTIC ADVANCED PO) Take 1 capsule by mouth Daily.   6/6/2021 at 1200   • Simethicone 250 MG capsule Take 4 capsules by mouth Daily.   6/6/2021 at 1200   • traZODone (DESYREL) 50 MG tablet Take 1 tablet by mouth At Night As Needed for Sleep. 90 tablet 1 6/6/2021 at 2100   • azithromycin (Zithromax Z-Germán) 250 MG tablet Take by mouth as directed on package 6 tablet 0    • desoximetasone (TOPICORT) 0.25 % cream Apply  topically to the appropriate area as directed 2 (Two) Times a Day. 60 g 0 Unknown at Unknown time   • dicyclomine (BENTYL) 20 MG tablet Take 1 tablet by mouth Every 6 (Six) Hours. 60 tablet 3 Unknown at Unknown time   • pantoprazole (PROTONIX) 40 MG EC tablet Take 1 tablet by mouth 2 (two) times a day. 180 tablet 0    • progesterone (Prometrium) 100 MG capsule Take 1 capsule by mouth Daily. 90 capsule 3      Allergies:  Lanolin    REVIEW OF SYSTEMS:  Please see the above history of present illness for pertinent positives and negatives.  The remainder of the patient's systems have been reviewed and are negative.     Vital Signs  Temp:  [98.2 °F  (36.8 °C)] 98.2 °F (36.8 °C)  Heart Rate:  [87] 87  Resp:  [18] 18  BP: (141)/(99) 141/99    Flowsheet Rows      First Filed Value   Admission Height  --   Admission Weight  85.7 kg (189 lb) Documented at 06/07/2021 0858           Physical Exam:  Physical Exam   Constitutional: Patient appears well-developed and well-nourished and in no acute distress   HEENT:   Head: Normocephalic and atraumatic.   Eyes:  Pupils are equal, round, and reactive to light. EOM are intact. Sclerae are anicteric and non-injected.  Mouth and Throat: Patient has moist mucous membranes. Oropharynx is clear of any erythema or exudate.     Neck: Neck supple. No JVD present. No thyromegaly present. No lymphadenopathy present.  Cardiovascular: Regular rate, regular rhythm, S1 normal and S2 normal.  Exam reveals no gallop and no friction rub.  No murmur heard.  Pulmonary/Chest: Lungs are clear to auscultation bilaterally. No respiratory distress. No wheezes. No rhonchi. No rales.   Abdominal: Soft. Bowel sounds are normal. No distension and no mass. There is no hepatosplenomegaly. There is no tenderness.   Musculoskeletal: Normal Muscle tone  Extremities: No edema. Pulses are palpable in all 4 extremities.  Neurological: Patient is alert and oriented to person, place, and time. Cranial nerves II-XII are grossly intact with no focal deficits.  Skin: Skin is warm. No rash noted. Nails show no clubbing.  No cyanosis or erythema.    Debilities/Disabilities Identified: None  Emotional Behavior: Appropriate     Results Review:   I reviewed the patient's new clinical results.    Lab Results (most recent)     None          Imaging Results (Most Recent)     None        reviewed    ECG/EMG Results (most recent)     None        reviewed    Assessment/Plan   Screening CRC/  colonoscopy      I discussed the patient's findings and my recommendations with patient.     Rey Fishman MD  06/07/21  09:09 EDT    Time: 10 min prior to procedure.

## 2021-06-08 LAB
LAB AP CASE REPORT: NORMAL
LAB AP DIAGNOSIS COMMENT: NORMAL
PATH REPORT.FINAL DX SPEC: NORMAL
PATH REPORT.GROSS SPEC: NORMAL

## 2021-06-15 PROBLEM — Z86.0100 HISTORY OF COLON POLYPS: Status: ACTIVE | Noted: 2021-06-15

## 2021-06-15 PROBLEM — Z86.010 HISTORY OF COLON POLYPS: Status: ACTIVE | Noted: 2021-06-15

## 2021-06-25 RX ORDER — CRISABOROLE 20 MG/G
1 OINTMENT TOPICAL 2 TIMES DAILY
Qty: 60 G | Refills: 4 | Status: SHIPPED | OUTPATIENT
Start: 2021-06-25 | End: 2022-03-28 | Stop reason: SDUPTHER

## 2021-06-25 RX ORDER — TRAZODONE HYDROCHLORIDE 50 MG/1
50 TABLET ORAL NIGHTLY PRN
Qty: 90 TABLET | Refills: 1 | Status: SHIPPED | OUTPATIENT
Start: 2021-06-25 | End: 2022-01-06 | Stop reason: SDUPTHER

## 2021-07-22 PROBLEM — K58.0 IRRITABLE BOWEL SYNDROME WITH DIARRHEA: Status: ACTIVE | Noted: 2021-07-22

## 2021-08-06 ENCOUNTER — TELEPHONE (OUTPATIENT)
Dept: GASTROENTEROLOGY | Facility: CLINIC | Age: 51
End: 2021-08-06

## 2021-08-06 NOTE — TELEPHONE ENCOUNTER
7/11/21     Per Yvon @ [facility] advised that they will only pay for #126 Xifaxan within a year. Patient had 3 refills amounting to #126 total by 6/18/21.  The only way to get this appealed and approved again this year is if patient meets all of this criteria:    1.  Patient had 30% decrease in abdominal discomfort.  2.  Patient had 50% reduction in soft stools or diarrhea.  3.  Must be 10 weeks between each 14 course of tx.     Per Dr. Fishman, Xifaxan treatment should not exceed these criteria, and patient's tx needs to be re-evaluated.  Patient advised and voiced understanding.

## 2021-09-01 DIAGNOSIS — R53.81 MALAISE AND FATIGUE: Primary | ICD-10-CM

## 2021-09-01 DIAGNOSIS — R53.83 MALAISE AND FATIGUE: Primary | ICD-10-CM

## 2021-09-02 ENCOUNTER — LAB (OUTPATIENT)
Dept: LAB | Facility: HOSPITAL | Age: 51
End: 2021-09-02

## 2021-09-02 ENCOUNTER — TELEMEDICINE (OUTPATIENT)
Dept: INTERNAL MEDICINE | Facility: CLINIC | Age: 51
End: 2021-09-02

## 2021-09-02 DIAGNOSIS — R53.81 MALAISE AND FATIGUE: Primary | ICD-10-CM

## 2021-09-02 DIAGNOSIS — T50.Z95A ADVERSE EFFECT OF VACCINE, INITIAL ENCOUNTER: ICD-10-CM

## 2021-09-02 DIAGNOSIS — R53.83 MALAISE AND FATIGUE: Primary | ICD-10-CM

## 2021-09-02 DIAGNOSIS — R42 DIZZINESS: Primary | ICD-10-CM

## 2021-09-02 DIAGNOSIS — R53.81 MALAISE AND FATIGUE: ICD-10-CM

## 2021-09-02 DIAGNOSIS — R53.83 MALAISE AND FATIGUE: ICD-10-CM

## 2021-09-02 LAB
ALBUMIN SERPL-MCNC: 4.3 G/DL (ref 3.5–5.2)
ALBUMIN/GLOB SERPL: 1.5 G/DL
ALP SERPL-CCNC: 99 U/L (ref 39–117)
ALT SERPL W P-5'-P-CCNC: 34 U/L (ref 1–33)
ANION GAP SERPL CALCULATED.3IONS-SCNC: 11.1 MMOL/L (ref 5–15)
AST SERPL-CCNC: 26 U/L (ref 1–32)
BASOPHILS # BLD AUTO: 0.07 10*3/MM3 (ref 0–0.2)
BASOPHILS NFR BLD AUTO: 1.2 % (ref 0–1.5)
BILIRUB SERPL-MCNC: 0.3 MG/DL (ref 0–1.2)
BUN SERPL-MCNC: 14 MG/DL (ref 6–20)
BUN/CREAT SERPL: 18.9 (ref 7–25)
CALCIUM SPEC-SCNC: 9.2 MG/DL (ref 8.6–10.5)
CHLORIDE SERPL-SCNC: 100 MMOL/L (ref 98–107)
CO2 SERPL-SCNC: 24.9 MMOL/L (ref 22–29)
CREAT SERPL-MCNC: 0.74 MG/DL (ref 0.57–1)
CRP SERPL-MCNC: <0.3 MG/DL (ref 0–0.5)
DEPRECATED RDW RBC AUTO: 42.8 FL (ref 37–54)
EOSINOPHIL # BLD AUTO: 0.12 10*3/MM3 (ref 0–0.4)
EOSINOPHIL NFR BLD AUTO: 2.1 % (ref 0.3–6.2)
ERYTHROCYTE [DISTWIDTH] IN BLOOD BY AUTOMATED COUNT: 12.4 % (ref 12.3–15.4)
GFR SERPL CREATININE-BSD FRML MDRD: 83 ML/MIN/1.73
GLOBULIN UR ELPH-MCNC: 2.9 GM/DL
GLUCOSE SERPL-MCNC: 82 MG/DL (ref 65–99)
HCT VFR BLD AUTO: 42.5 % (ref 34–46.6)
HGB BLD-MCNC: 14.8 G/DL (ref 12–15.9)
IMM GRANULOCYTES # BLD AUTO: 0.02 10*3/MM3 (ref 0–0.05)
IMM GRANULOCYTES NFR BLD AUTO: 0.4 % (ref 0–0.5)
LYMPHOCYTES # BLD AUTO: 2.31 10*3/MM3 (ref 0.7–3.1)
LYMPHOCYTES NFR BLD AUTO: 40.7 % (ref 19.6–45.3)
MCH RBC QN AUTO: 33 PG (ref 26.6–33)
MCHC RBC AUTO-ENTMCNC: 34.8 G/DL (ref 31.5–35.7)
MCV RBC AUTO: 94.9 FL (ref 79–97)
MONOCYTES # BLD AUTO: 0.36 10*3/MM3 (ref 0.1–0.9)
MONOCYTES NFR BLD AUTO: 6.3 % (ref 5–12)
NEUTROPHILS NFR BLD AUTO: 2.8 10*3/MM3 (ref 1.7–7)
NEUTROPHILS NFR BLD AUTO: 49.3 % (ref 42.7–76)
NRBC BLD AUTO-RTO: 0 /100 WBC (ref 0–0.2)
PLATELET # BLD AUTO: 240 10*3/MM3 (ref 140–450)
PMV BLD AUTO: 10.2 FL (ref 6–12)
POTASSIUM SERPL-SCNC: 3.7 MMOL/L (ref 3.5–5.2)
PROT SERPL-MCNC: 7.2 G/DL (ref 6–8.5)
RBC # BLD AUTO: 4.48 10*6/MM3 (ref 3.77–5.28)
SARS-COV-2 RNA PNL SPEC NAA+PROBE: NOT DETECTED
SODIUM SERPL-SCNC: 136 MMOL/L (ref 136–145)
TSH SERPL DL<=0.05 MIU/L-ACNC: 2.52 UIU/ML (ref 0.27–4.2)
WBC # BLD AUTO: 5.68 10*3/MM3 (ref 3.4–10.8)

## 2021-09-02 PROCEDURE — C9803 HOPD COVID-19 SPEC COLLECT: HCPCS

## 2021-09-02 PROCEDURE — 85025 COMPLETE CBC W/AUTO DIFF WBC: CPT

## 2021-09-02 PROCEDURE — 99213 OFFICE O/P EST LOW 20 MIN: CPT | Performed by: FAMILY MEDICINE

## 2021-09-02 PROCEDURE — 36415 COLL VENOUS BLD VENIPUNCTURE: CPT

## 2021-09-02 PROCEDURE — 87635 SARS-COV-2 COVID-19 AMP PRB: CPT

## 2021-09-02 PROCEDURE — 86140 C-REACTIVE PROTEIN: CPT

## 2021-09-02 PROCEDURE — 84443 ASSAY THYROID STIM HORMONE: CPT

## 2021-09-02 PROCEDURE — 80053 COMPREHEN METABOLIC PANEL: CPT

## 2021-09-02 NOTE — PROGRESS NOTES
"Subjective   Nadja Shipley is a 51 y.o. female presenting today for follow up of   Chief Complaint   Patient presents with   • Chills   • Dizziness   You have chosen to receive care through a telehealth visit.  Do you consent to use a video/audio connection for your medical care today? Yes  Total discussion time was 10 minutes.    History of Present Illness     Pt presents with 6 days of dizziness, nausea, headaches, sore throat, body aches, chills, sweats, decreased appetite, and \"brain fog.\"  Symptoms began 24 hours after she received the [employer] Covid-19 vaccine and she believes this to be a reaction to the vaccine, that the vaccine causing a flare in her POTS.  She has no sick contacts and no known exposures to Covid-19 or other illnesses.  She maintains her senses of taste and smell.  She is not aware of any fevers.  She has tried treating this with hydration, NSAIDs, vitamins, and gentle exercise.  However, she feels light-headed whenever she is upright.  She states that her symptoms haven't improved over time.  She has been unable to go to work.    Patient Active Problem List   Diagnosis   • Irritable bowel syndrome   • Chronic neck pain   • Rosacea   • Insomnia   • Annual physical exam   • Hot flashes   • Screening for colon cancer   • History of colon polyps   • Irritable bowel syndrome with diarrhea       Current Outpatient Medications on File Prior to Visit   Medication Sig   • albuterol sulfate  (90 Base) MCG/ACT inhaler Inhale 2 puffs by mouth into the lungs every 4 hours as needed for wheezing or coughing.   • amitriptyline (ELAVIL) 50 MG tablet Take 1 tablet by mouth every night at bedtime.   • baclofen (LIORESAL) 20 MG tablet Take 1 tablet by mouth Every Night.   • Calcium Citrate 1040 MG tablet Take 1 tablet by mouth Daily.   • cetirizine (zyrTEC) 10 MG tablet Take 10 mg by mouth Daily.   • Crisaborole (Eucrisa) 2 % ointment Apply 1 application topically to the appropriate area as directed 2 (two) " times a day.   • desoximetasone (TOPICORT) 0.25 % cream Apply  topically to the appropriate area as directed 2 (Two) Times a Day.   • dicyclomine (BENTYL) 20 MG tablet Take 1 tablet by mouth Every 6 (Six) Hours.   • famotidine (PEPCID) 40 MG tablet Take 1 tablet by mouth 2 (Two) Times a Day.   • fluticasone (FLONASE) 50 MCG/ACT nasal spray 2 sprays into each nostril daily. Administer 2 sprays in each nostril for each dose.   • levothyroxine (SYNTHROID, LEVOTHROID) 75 MCG tablet Take 1 tablet by mouth Daily.   • magnesium chloride ER (MAG-DELAY) 535 (64 Mg) MG CR tablet Take 1 tablet by mouth Daily.   • meloxicam (MOBIC) 15 MG tablet Take 1 tablet by mouth Daily.   • methocarbamol (ROBAXIN) 750 MG tablet Take 1 tablet by mouth Daily.   • Multiple Vitamins-Minerals (MULTIVITAMIN ADULT PO) Take 1 tablet by mouth Daily.   • olopatadine (PATANOL) 0.1 % ophthalmic solution Administer 1 drop to both eyes 2 (Two) Times a Day As Needed.   • pantoprazole (PROTONIX) 40 MG EC tablet Take 1 tablet by mouth 2 (two) times a day.   • pantoprazole (PROTONIX) 40 MG EC tablet Take 1 tablet by mouth 2 (two) times a day.   • PARoxetine (PAXIL) 10 MG tablet Take 1 tablet by mouth Every Morning.   • Probiotic Product (PROBIOTIC ADVANCED PO) Take 1 capsule by mouth Daily.   • riFAXIMin (XIFAXAN) 550 MG tablet Take 1 tablet by mouth Every 8 (Eight) Hours.   • Simethicone 250 MG capsule Take 4 capsules by mouth Daily.   • traZODone (DESYREL) 50 MG tablet Take 1 tablet by mouth At Night As Needed for Sleep.     No current facility-administered medications on file prior to visit.          The following portions of the patient's history were reviewed and updated as appropriate: allergies, current medications, past family history, past medical history, past social history, past surgical history and problem list.    Review of Systems   Constitutional: Positive for activity change, appetite change, chills, diaphoresis and fatigue. Negative for  fever.   HENT: Positive for sore throat.    Respiratory: Negative.    Cardiovascular: Negative.    Gastrointestinal: Positive for nausea.   Musculoskeletal: Positive for myalgias.   Neurological: Positive for dizziness, light-headedness and headache.       Objective   There were no vitals filed for this visit.    BP Readings from Last 3 Encounters:   06/07/21 158/92   11/09/20 138/86   11/07/19 128/68        Wt Readings from Last 3 Encounters:   06/07/21 85.7 kg (189 lb)   11/09/20 83 kg (183 lb)   11/07/19 86.6 kg (191 lb)        There is no height or weight on file to calculate BMI.  Nursing notes and vitals reviewed.    Physical Exam  Constitutional:       General: She is not in acute distress.  HENT:      Head: Normocephalic and atraumatic.   Eyes:      Extraocular Movements: Extraocular movements intact.      Conjunctiva/sclera: Conjunctivae normal.   Pulmonary:      Effort: Pulmonary effort is normal. No respiratory distress.   Musculoskeletal:      Cervical back: Neck supple. No rigidity.   Skin:     Coloration: Skin is not pale.      Findings: No erythema.   Neurological:      General: No focal deficit present.      Mental Status: She is alert and oriented to person, place, and time.   Psychiatric:         Mood and Affect: Mood normal.         Behavior: Behavior normal.         No results found for this or any previous visit (from the past 672 hour(s)).      Assessment/Plan   Diagnoses and all orders for this visit:    1. Dizziness (Primary)    2. Malaise and fatigue  -     Comprehensive Metabolic Panel; Future  -     C-reactive Protein; Future  -     CBC & Differential; Future  -     TSH; Future  -     COVID-19,[facility] ROUTINE, NP/OP SWAB IN TRANSPORT MEDIA OR ESWAB 72 HR TAT - Swab, Anterior nasal; Future    3. Adverse effect of vaccine, initial encounter      Continued rest and symptomatic treatment.  Hydrate well.  Gentle exercise as tolerated.  Check labs, including Covid-19 test.  Warning s/sxs  discussed.          Medications, including side effects, were discussed with the patient. Patient verbalized understanding.  The plan of care was discussed. All questions were answered. Patient verbalized understanding.      No follow-ups on file.

## 2021-09-03 ENCOUNTER — TELEPHONE (OUTPATIENT)
Dept: INTERNAL MEDICINE | Facility: CLINIC | Age: 51
End: 2021-09-03

## 2021-09-03 NOTE — TELEPHONE ENCOUNTER
Caller: MoabNadja    Relationship: Self    Best call back number: [phone]    What form or medical record are you requesting: APPOINTMENT NOTES FROM THE APPOINTMENT ON 09/02/2021    Who is requesting this form or medical record from you: [facility]    How would you like to receive the form or medical records (pick-up, mail, fax): FAX  If fax, what is the fax number: [phone] ATTN TANA NIELSON AT [facility]    Timeframe paperwork needed: ASAP    Additional notes: PATIENT STATES THEY NEED THESE NOTES SO THEY CAN APPLY FOR THE COVID VACCINE WORKMAN'S COMP.

## 2021-09-14 ENCOUNTER — OFFICE VISIT (OUTPATIENT)
Dept: CARDIOLOGY | Facility: CLINIC | Age: 51
End: 2021-09-14

## 2021-09-14 ENCOUNTER — HOSPITAL ENCOUNTER (OUTPATIENT)
Dept: CARDIOLOGY | Facility: HOSPITAL | Age: 51
Discharge: HOME | End: 2021-09-14

## 2021-09-14 ENCOUNTER — LAB (OUTPATIENT)
Dept: LAB | Facility: HOSPITAL | Age: 51
End: 2021-09-14

## 2021-09-14 VITALS
DIASTOLIC BLOOD PRESSURE: 113 MMHG | OXYGEN SATURATION: 98 % | BODY MASS INDEX: 25.73 KG/M2 | SYSTOLIC BLOOD PRESSURE: 166 MMHG | TEMPERATURE: 99 F | WEIGHT: 190 LBS | HEART RATE: 78 BPM | HEIGHT: 72 IN | RESPIRATION RATE: 18 BRPM

## 2021-09-14 VITALS
HEART RATE: 89 BPM | BODY MASS INDEX: 26.28 KG/M2 | WEIGHT: 194 LBS | SYSTOLIC BLOOD PRESSURE: 138 MMHG | HEIGHT: 72 IN | DIASTOLIC BLOOD PRESSURE: 84 MMHG

## 2021-09-14 DIAGNOSIS — Z01.818 PRE-OP TESTING: Primary | ICD-10-CM

## 2021-09-14 DIAGNOSIS — G90.A POTS (POSTURAL ORTHOSTATIC TACHYCARDIA SYNDROME): Primary | ICD-10-CM

## 2021-09-14 DIAGNOSIS — G90.A POTS (POSTURAL ORTHOSTATIC TACHYCARDIA SYNDROME): ICD-10-CM

## 2021-09-14 LAB
MAXIMAL PREDICTED HEART RATE: 171 BPM
SARS-COV-2 RNA PNL SPEC NAA+PROBE: NOT DETECTED
STRESS TARGET HR: 145 BPM

## 2021-09-14 PROCEDURE — 87635 SARS-COV-2 COVID-19 AMP PRB: CPT

## 2021-09-14 PROCEDURE — 93660 TILT TABLE EVALUATION: CPT | Performed by: INTERNAL MEDICINE

## 2021-09-14 PROCEDURE — 99203 OFFICE O/P NEW LOW 30 MIN: CPT | Performed by: INTERNAL MEDICINE

## 2021-09-14 PROCEDURE — C9803 HOPD COVID-19 SPEC COLLECT: HCPCS

## 2021-09-14 PROCEDURE — 93660 TILT TABLE EVALUATION: CPT

## 2021-09-14 RX ORDER — ASPIRIN 325 MG
325 TABLET ORAL DAILY
COMMUNITY
End: 2021-09-29

## 2021-09-14 RX ORDER — SODIUM CHLORIDE 0.9 % (FLUSH) 0.9 %
10 SYRINGE (ML) INJECTION AS NEEDED
Status: DISCONTINUED | OUTPATIENT
Start: 2021-09-14 | End: 2021-09-14 | Stop reason: HOSPADM

## 2021-09-14 RX ORDER — FLUDROCORTISONE ACETATE 0.1 MG/1
0.1 TABLET ORAL DAILY
Qty: 90 TABLET | Refills: 3 | Status: SHIPPED | OUTPATIENT
Start: 2021-09-14 | End: 2021-11-11

## 2021-09-14 RX ORDER — SODIUM CHLORIDE 0.9 % (FLUSH) 0.9 %
3 SYRINGE (ML) INJECTION EVERY 12 HOURS SCHEDULED
Status: DISCONTINUED | OUTPATIENT
Start: 2021-09-14 | End: 2021-09-14 | Stop reason: HOSPADM

## 2021-09-14 RX ORDER — HYDROXYCHLOROQUINE SULFATE 200 MG/1
200 TABLET, FILM COATED ORAL 2 TIMES DAILY
COMMUNITY
Start: 2021-09-10 | End: 2022-02-17

## 2021-09-14 RX ORDER — SODIUM CHLORIDE 9 MG/ML
50 INJECTION, SOLUTION INTRAVENOUS CONTINUOUS
Status: DISCONTINUED | OUTPATIENT
Start: 2021-09-14 | End: 2021-09-15 | Stop reason: HOSPADM

## 2021-09-14 RX ORDER — AZITHROMYCIN 250 MG/1
TABLET, FILM COATED ORAL
COMMUNITY
Start: 2021-09-10 | End: 2021-09-29

## 2021-09-14 RX ORDER — NITROGLYCERIN 0.4 MG/1
0.4 TABLET SUBLINGUAL ONCE AS NEEDED
Status: DISCONTINUED | OUTPATIENT
Start: 2021-09-14 | End: 2021-09-15 | Stop reason: HOSPADM

## 2021-09-14 RX ORDER — LIDOCAINE HYDROCHLORIDE 10 MG/ML
0.1 INJECTION, SOLUTION EPIDURAL; INFILTRATION; INTRACAUDAL; PERINEURAL ONCE AS NEEDED
Status: DISCONTINUED | OUTPATIENT
Start: 2021-09-14 | End: 2021-09-14 | Stop reason: HOSPADM

## 2021-09-14 RX ADMIN — SODIUM CHLORIDE 50 ML/HR: 9 INJECTION, SOLUTION INTRAVENOUS at 10:50

## 2021-09-14 NOTE — PROGRESS NOTES
"    Subjective:     Encounter Date:09/14/21      Patient ID: Nadja Shipley is a 51 y.o. female.    Chief Complaint:  History of Present Illness    Dear Dr. Craig,    I had the pleasure of seeing this patient in the office today for initial evaluation and consultation.  I appreciate that you sent her in to see us.  They come in today to be seen for evaluation of exacerbation of POTS.    Patient has never been formally diagnosed but she carries a diagnosis of pots syndrome for many years.  She has had orthostatic symptoms for many years.  She has had rare episodes of syncope.  She has had these sensations and symptoms since she was a teenager.  She does have a daughter who has severe POTS syndrome and is treated for that.    Patient received the COVID vaccination in August and by the next day was feeling worse and about 10 days later became even more symptomatic.  She feels extremely lightheaded now anytime she is gets up or standing for any length of time.  She been checking her blood pressure with a wrist blood pressure cuff but that is yielding wildly disparate results, and we will check a today it is not reading accurately    The following portions of the patient's history were reviewed and updated as appropriate: allergies, current medications, past family history, past medical history, past social history, past surgical history and problem list.      ECG 12 Lead    Date/Time: 9/15/2021 8:52 AM  Performed by: Darrian Bailey III, MD  Authorized by: Darrian Bailey III, MD   Comparison: compared with previous ECG   Similar to previous ECG  Rhythm: sinus rhythm  Rate: normal  Conduction: conduction normal  ST Segments: ST segments normal  T Waves: T waves normal  QRS axis: normal  Other: no other findings    Clinical impression: normal ECG               Objective:     Vitals:    09/14/21 0817   BP: 138/84   Pulse: 89   Weight: 88 kg (194 lb)   Height: 182.9 cm (72\")     Body mass index is 26.31 kg/m².      Vitals " reviewed.   Constitutional:       General: Not in acute distress.     Appearance: Well-developed. Not diaphoretic.   Eyes:      General:         Right eye: No discharge.         Left eye: No discharge.      Conjunctiva/sclera: Conjunctivae normal.      Pupils: Pupils are equal, round, and reactive to light.   HENT:      Head: Normocephalic and atraumatic.      Nose: Nose normal.   Neck:      Thyroid: No thyromegaly.      Trachea: No tracheal deviation.      Lymphadenopathy: No cervical adenopathy.   Pulmonary:      Effort: Pulmonary effort is normal. No respiratory distress.      Breath sounds: Normal breath sounds. No stridor.   Chest:      Chest wall: Not tender to palpatation.   Cardiovascular:      Normal rate. Regular rhythm.      Murmurs: There is no murmur.      . No S3 gallop. No click. No rub.   Pulses:     Intact distal pulses.   Abdominal:      General: Bowel sounds are normal. There is no distension.      Palpations: Abdomen is soft. There is no abdominal mass.      Tenderness: There is no abdominal tenderness. There is no guarding or rebound.   Musculoskeletal: Normal range of motion.         General: No tenderness or deformity.      Cervical back: Normal range of motion and neck supple. Skin:     General: Skin is warm and dry.      Findings: No erythema or rash.   Neurological:      Mental Status: Alert and oriented to person, place, and time.      Deep Tendon Reflexes: Reflexes are normal and symmetric.   Psychiatric:         Thought Content: Thought content normal.         Data and records reviewed:     Lab Results   Component Value Date    GLUCOSE 82 09/02/2021    BUN 14 09/02/2021    CREATININE 0.74 09/02/2021    EGFRIFNONA 83 09/02/2021    EGFRIFAFRI 97 08/17/2017    BCR 18.9 09/02/2021    K 3.7 09/02/2021    CO2 24.9 09/02/2021    CALCIUM 9.2 09/02/2021    PROTENTOTREF 7.6 08/17/2017    ALBUMIN 4.30 09/02/2021    LABIL2 1.5 08/17/2017    AST 26 09/02/2021    ALT 34 (H) 09/02/2021     Lab Results    Component Value Date    CHOL 246 (H) 11/05/2020    CHOL 234 (H) 10/31/2019    CHOL 209 (H) 09/06/2018     Lab Results   Component Value Date    TRIG 85 11/05/2020    TRIG 57 10/31/2019    TRIG 81 09/06/2018     Lab Results   Component Value Date    HDL 71 (H) 11/05/2020    HDL 72 (H) 10/31/2019    HDL 67 (H) 09/06/2018     Lab Results   Component Value Date     (H) 11/05/2020     (H) 10/31/2019     (H) 09/06/2018     Lab Results   Component Value Date    VLDL 14 11/05/2020    VLDL 11.4 10/31/2019    VLDL 16.2 09/06/2018     Lab Results   Component Value Date    LDLHDL 1.88 09/06/2018     CBC    CBC 11/5/20 9/2/21   WBC 5.81 5.68   RBC 4.60 4.48   Hemoglobin 14.2 14.8   Hematocrit 41.8 42.5   MCV 90.9 94.9   MCH 30.9 33.0   MCHC 34.0 34.8   RDW 12.0 (A) 12.4   Platelets 276 240   (A) Abnormal value            No radiology results for the last 90 days.          Assessment:          Diagnosis Plan   1. POTS (postural orthostatic tachycardia syndrome)  fludrocortisone 0.1 MG tablet    Holter Monitor - 24 Hour    Tilt Table    Tilt Table    Holter Monitor - 24 Hour    ECG 12 Lead          Plan:       1.  POTS syndrome/dysautonomia, patient states that she has had POTS syndrome for her entire life, although has never been formally evaluated and are not sure if this is POTS or a dysautonomia with a vasodepressor component.  It sounds like it may be more the latter.  We initially fludrocortisone 0.1 mg daily, we will arrange for tilt table test.  I have asked her to check back in with me on Monday.  Patient has not yet cleared to return to work.    Thank you very much for allowing us to participate in the care of this pleasant patient.  Please don't hesitate to call if I can be of assistance in any way.      Current Outpatient Medications:   •  albuterol sulfate  (90 Base) MCG/ACT inhaler, Inhale 2 puffs by mouth into the lungs every 4 hours as needed for wheezing or coughing., Disp: 8.5 g,  Rfl: 0  •  amitriptyline (ELAVIL) 50 MG tablet, Take 1 tablet by mouth every night at bedtime., Disp: 90 tablet, Rfl: 1  •  aspirin 325 MG tablet, Take 325 mg by mouth Daily., Disp: , Rfl:   •  azithromycin (ZITHROMAX) 250 MG tablet, , Disp: , Rfl:   •  baclofen (LIORESAL) 20 MG tablet, Take 1 tablet by mouth Every Night. (Patient taking differently: Take 20 mg by mouth As Needed.), Disp: 90 tablet, Rfl: 3  •  cetirizine (zyrTEC) 10 MG tablet, Take 10 mg by mouth Daily., Disp: , Rfl:   •  Crisaborole (Eucrisa) 2 % ointment, Apply 1 application topically to the appropriate area as directed 2 (two) times a day., Disp: 60 g, Rfl: 4  •  desoximetasone (TOPICORT) 0.25 % cream, Apply  topically to the appropriate area as directed 2 (Two) Times a Day., Disp: 60 g, Rfl: 0  •  dicyclomine (BENTYL) 20 MG tablet, Take 1 tablet by mouth Every 6 (Six) Hours. (Patient taking differently: Take 20 mg by mouth As Needed.), Disp: 60 tablet, Rfl: 3  •  famotidine (PEPCID) 40 MG tablet, Take 1 tablet by mouth 2 (Two) Times a Day., Disp: 180 tablet, Rfl: 3  •  fluticasone (FLONASE) 50 MCG/ACT nasal spray, 2 sprays into each nostril daily. Administer 2 sprays in each nostril for each dose., Disp: 3 each, Rfl: 3  •  hydroxychloroquine (PLAQUENIL) 200 MG tablet, Take 200 mg by mouth 2 (Two) Times a Day., Disp: , Rfl:   •  levothyroxine (SYNTHROID, LEVOTHROID) 75 MCG tablet, Take 1 tablet by mouth Daily., Disp: 90 tablet, Rfl: 3  •  magnesium chloride ER (MAG-DELAY) 535 (64 Mg) MG CR tablet, Take 1 tablet by mouth Daily., Disp: , Rfl:   •  meloxicam (MOBIC) 15 MG tablet, Take 1 tablet by mouth Daily., Disp: 90 tablet, Rfl: 3  •  methocarbamol (ROBAXIN) 750 MG tablet, Take 1 tablet by mouth Daily., Disp: 90 tablet, Rfl: 3  •  Multiple Vitamins-Minerals (MULTIVITAMIN ADULT PO), Take 1 tablet by mouth Daily., Disp: , Rfl:   •  olopatadine (PATANOL) 0.1 % ophthalmic solution, Administer 1 drop to both eyes 2 (Two) Times a Day As Needed., Disp:  5 mL, Rfl: 11  •  pantoprazole (PROTONIX) 40 MG EC tablet, Take 1 tablet by mouth 2 (two) times a day., Disp: 180 tablet, Rfl: 3  •  PARoxetine (PAXIL) 10 MG tablet, Take 1 tablet by mouth Every Morning., Disp: 30 tablet, Rfl: 5  •  Probiotic Product (PROBIOTIC ADVANCED PO), Take 1 capsule by mouth Daily., Disp: , Rfl:   •  Simethicone 250 MG capsule, Take 4 capsules by mouth Daily., Disp: , Rfl:   •  traZODone (DESYREL) 50 MG tablet, Take 1 tablet by mouth At Night As Needed for Sleep., Disp: 90 tablet, Rfl: 1         No follow-ups on file.

## 2021-09-15 PROCEDURE — 93000 ELECTROCARDIOGRAM COMPLETE: CPT | Performed by: INTERNAL MEDICINE

## 2021-09-16 DIAGNOSIS — R23.2 HOT FLASHES: ICD-10-CM

## 2021-09-17 NOTE — TELEPHONE ENCOUNTER
Rx Refill Note  Requested Prescriptions     Pending Prescriptions Disp Refills    PARoxetine (PAXIL) 10 MG tablet 30 tablet 5     Sig: Take 1 tablet by mouth Every Morning.      Last office visit with prescribing clinician: 11/9/2020      Next office visit with prescribing clinician: 11/11/2021            Miladys Celeste MA  09/17/21, 10:29 EDT

## 2021-09-20 ENCOUNTER — HOSPITAL ENCOUNTER (OUTPATIENT)
Dept: CARDIOLOGY | Facility: HOSPITAL | Age: 51
Discharge: HOME | End: 2021-09-20
Admitting: INTERNAL MEDICINE

## 2021-09-20 DIAGNOSIS — G90.A POTS (POSTURAL ORTHOSTATIC TACHYCARDIA SYNDROME): Primary | ICD-10-CM

## 2021-09-20 DIAGNOSIS — G90.A POTS (POSTURAL ORTHOSTATIC TACHYCARDIA SYNDROME): ICD-10-CM

## 2021-09-20 PROCEDURE — 93225 XTRNL ECG REC<48 HRS REC: CPT

## 2021-09-20 PROCEDURE — 93226 XTRNL ECG REC<48 HR SCAN A/R: CPT

## 2021-09-20 RX ORDER — PINDOLOL 5 MG/1
5 TABLET ORAL 2 TIMES DAILY
Qty: 60 TABLET | Refills: 5 | Status: SHIPPED | OUTPATIENT
Start: 2021-09-20 | End: 2021-11-11

## 2021-09-20 RX ORDER — PAROXETINE 10 MG/1
10 TABLET, FILM COATED ORAL EVERY MORNING
Qty: 30 TABLET | Refills: 5 | Status: SHIPPED | OUTPATIENT
Start: 2021-09-20 | End: 2021-10-04

## 2021-09-22 LAB
MAXIMAL PREDICTED HEART RATE: 171 BPM
STRESS TARGET HR: 145 BPM

## 2021-09-22 PROCEDURE — 93227 XTRNL ECG REC<48 HR R&I: CPT | Performed by: INTERNAL MEDICINE

## 2021-09-24 RX ORDER — CLONIDINE HYDROCHLORIDE 0.1 MG/1
0.1 TABLET ORAL 2 TIMES DAILY
Qty: 60 TABLET | Refills: 5 | Status: SHIPPED | OUTPATIENT
Start: 2021-09-24 | End: 2022-02-21 | Stop reason: SDUPTHER

## 2021-09-29 ENCOUNTER — LAB (OUTPATIENT)
Dept: LAB | Facility: HOSPITAL | Age: 51
End: 2021-09-29

## 2021-09-29 ENCOUNTER — OFFICE VISIT (OUTPATIENT)
Dept: CARDIOLOGY | Facility: CLINIC | Age: 51
End: 2021-09-29

## 2021-09-29 VITALS
DIASTOLIC BLOOD PRESSURE: 90 MMHG | SYSTOLIC BLOOD PRESSURE: 128 MMHG | WEIGHT: 191 LBS | HEIGHT: 72 IN | OXYGEN SATURATION: 94 % | BODY MASS INDEX: 25.87 KG/M2 | HEART RATE: 78 BPM | RESPIRATION RATE: 16 BRPM

## 2021-09-29 DIAGNOSIS — I10 SUPINE HYPERTENSION: Chronic | ICD-10-CM

## 2021-09-29 DIAGNOSIS — I95.1 AUTONOMIC ORTHOSTATIC HYPOTENSION: Primary | ICD-10-CM

## 2021-09-29 DIAGNOSIS — G90.9 AUTONOMIC DYSFUNCTION: ICD-10-CM

## 2021-09-29 LAB — D DIMER PPP FEU-MCNC: 0.3 MCGFEU/ML (ref 0–0.46)

## 2021-09-29 PROCEDURE — 36415 COLL VENOUS BLD VENIPUNCTURE: CPT

## 2021-09-29 PROCEDURE — 93000 ELECTROCARDIOGRAM COMPLETE: CPT | Performed by: INTERNAL MEDICINE

## 2021-09-29 PROCEDURE — 85379 FIBRIN DEGRADATION QUANT: CPT

## 2021-09-29 PROCEDURE — 99213 OFFICE O/P EST LOW 20 MIN: CPT | Performed by: INTERNAL MEDICINE

## 2021-09-29 NOTE — PROGRESS NOTES
Subjective:     Encounter Date:09/30/21      Patient ID: Nadja Shipley is a 51 y.o. female.    Chief Complaint:  History of Present Illness    Dear Dr. Craig,    I had the pleasure of seeing this patient in the office today for follow-up of her autonomic dysfunction.    We saw her recently because of new onset of severe incapacitating orthostatic symptoms.  This started right after she received her Covid vaccination.  We for tilt table test that was positive for vasodepressor pathology.  Patient was started on Florinef but did not have any improvement in her orthostatic symptoms and develop exacerbation of supine hypertension.  Pindolol was then added.  She then called and said she was doing no better blood pressure still uncontrolled.  Clonidine was added and she was scheduled for appointment today.  She took her first dose yesterday in the morning blood pressure was lower in the afternoon so she did not take it blood pressure was very high this morning she took it is her second dose.  She has not noticed any improvement in her symptoms yet.    Patient has never been formally diagnosed but she carries a diagnosis of pots syndrome for many years.  She has had orthostatic symptoms for many years.  She has had rare episodes of syncope.  She has had these sensations and symptoms since she was a teenager.  She does have a daughter who has severe POTS syndrome and is treated for that.    Patient received the COVID vaccination in August and by the next day was feeling worse and about 10 days later became even more symptomatic.  She feels extremely lightheaded now anytime she is gets up or standing for any length of time.  She been checking her blood pressure with a wrist blood pressure cuff but that is yielding wildly disparate results, and we will check a today it is not reading accurately    The following portions of the patient's history were reviewed and updated as appropriate: allergies, current  "medications, past family history, past medical history, past social history, past surgical history and problem list.      ECG 12 Lead    Date/Time: 9/29/2021 5:36 PM  Performed by: Darrian Bailey III, MD  Authorized by: Darrian Bailey III, MD   Comparison: compared with previous ECG   Similar to previous ECG  Rhythm: sinus rhythm  Rate: normal  Conduction: conduction normal  ST Segments: ST segments normal  T Waves: T waves normal  QRS axis: normal  Other: no other findings    Clinical impression: normal ECG               Objective:     Vitals:    09/29/21 1350   BP: 128/90   Pulse: 78   Resp: 16   SpO2: 94%   Weight: 86.6 kg (191 lb)   Height: 182.9 cm (72\")     Body mass index is 25.9 kg/m².      Vitals reviewed.   Constitutional:       General: Not in acute distress.     Appearance: Well-developed. Not diaphoretic.   Eyes:      General:         Right eye: No discharge.         Left eye: No discharge.      Conjunctiva/sclera: Conjunctivae normal.      Pupils: Pupils are equal, round, and reactive to light.   HENT:      Head: Normocephalic and atraumatic.      Nose: Nose normal.   Neck:      Thyroid: No thyromegaly.      Trachea: No tracheal deviation.      Lymphadenopathy: No cervical adenopathy.   Pulmonary:      Effort: Pulmonary effort is normal. No respiratory distress.      Breath sounds: Normal breath sounds. No stridor.   Chest:      Chest wall: Not tender to palpatation.   Cardiovascular:      Normal rate. Regular rhythm.      Murmurs: There is no murmur.      . No S3 gallop. No click. No rub.   Pulses:     Intact distal pulses.   Abdominal:      General: Bowel sounds are normal. There is no distension.      Palpations: Abdomen is soft. There is no abdominal mass.      Tenderness: There is no abdominal tenderness. There is no guarding or rebound.   Musculoskeletal: Normal range of motion.         General: No tenderness or deformity.      Cervical back: Normal range of motion and neck supple. Skin:     " General: Skin is warm and dry.      Findings: No erythema or rash.   Neurological:      Mental Status: Alert and oriented to person, place, and time.      Deep Tendon Reflexes: Reflexes are normal and symmetric.   Psychiatric:         Thought Content: Thought content normal.         Data and records reviewed:     Lab Results   Component Value Date    GLUCOSE 82 09/02/2021    BUN 14 09/02/2021    CREATININE 0.74 09/02/2021    EGFRIFNONA 83 09/02/2021    EGFRIFAFRI 97 08/17/2017    BCR 18.9 09/02/2021    K 3.7 09/02/2021    CO2 24.9 09/02/2021    CALCIUM 9.2 09/02/2021    PROTENTOTREF 7.6 08/17/2017    ALBUMIN 4.30 09/02/2021    LABIL2 1.5 08/17/2017    AST 26 09/02/2021    ALT 34 (H) 09/02/2021     Lab Results   Component Value Date    CHOL 246 (H) 11/05/2020    CHOL 234 (H) 10/31/2019    CHOL 209 (H) 09/06/2018     Lab Results   Component Value Date    TRIG 85 11/05/2020    TRIG 57 10/31/2019    TRIG 81 09/06/2018     Lab Results   Component Value Date    HDL 71 (H) 11/05/2020    HDL 72 (H) 10/31/2019    HDL 67 (H) 09/06/2018     Lab Results   Component Value Date     (H) 11/05/2020     (H) 10/31/2019     (H) 09/06/2018     Lab Results   Component Value Date    VLDL 14 11/05/2020    VLDL 11.4 10/31/2019    VLDL 16.2 09/06/2018     Lab Results   Component Value Date    LDLHDL 1.88 09/06/2018     CBC    CBC 11/5/20 9/2/21   WBC 5.81 5.68   RBC 4.60 4.48   Hemoglobin 14.2 14.8   Hematocrit 41.8 42.5   MCV 90.9 94.9   MCH 30.9 33.0   MCHC 34.0 34.8   RDW 12.0 (A) 12.4   Platelets 276 240   (A) Abnormal value            No radiology results for the last 90 days.          Assessment:          Diagnosis Plan   1. Autonomic orthostatic hypotension     2. Autonomic dysfunction  D-dimer, Quantitative    ECG 12 Lead   3. Supine hypertension            Plan:       1.  Dysautonomia with orthostatic hypotension, supine hypertension.  Continue Florinef and pindolol, will follow on the initiation of clonidine,  we will see her again with a telehealth visit on Friday.  Patient still completely incapacitated and unable to function, both in personal life as well as from work-related aspect.  She has not yet cleared to return to work.    Thank you very much for allowing us to participate in the care of this pleasant patient.  Please don't hesitate to call if I can be of assistance in any way.      Current Outpatient Medications:   •  albuterol sulfate  (90 Base) MCG/ACT inhaler, Inhale 2 puffs by mouth into the lungs every 4 hours as needed for wheezing or coughing., Disp: 8.5 g, Rfl: 0  •  amitriptyline (ELAVIL) 50 MG tablet, Take 1 tablet by mouth every night at bedtime., Disp: 90 tablet, Rfl: 1  •  baclofen (LIORESAL) 20 MG tablet, Take 1 tablet by mouth Every Night. (Patient taking differently: Take 20 mg by mouth As Needed.), Disp: 90 tablet, Rfl: 3  •  cetirizine (zyrTEC) 10 MG tablet, Take 10 mg by mouth Daily., Disp: , Rfl:   •  cloNIDine (Catapres) 0.1 MG tablet, Take 1 tablet by mouth 2 (Two) Times a Day. (Patient taking differently: Take 0.1 mg by mouth Daily.), Disp: 60 tablet, Rfl: 5  •  Crisaborole (Eucrisa) 2 % ointment, Apply 1 application topically to the appropriate area as directed 2 (two) times a day., Disp: 60 g, Rfl: 4  •  desoximetasone (TOPICORT) 0.25 % cream, Apply  topically to the appropriate area as directed 2 (Two) Times a Day., Disp: 60 g, Rfl: 0  •  dicyclomine (BENTYL) 20 MG tablet, Take 1 tablet by mouth Every 6 (Six) Hours. (Patient taking differently: Take 20 mg by mouth As Needed.), Disp: 60 tablet, Rfl: 3  •  famotidine (PEPCID) 40 MG tablet, Take 1 tablet by mouth 2 (Two) Times a Day., Disp: 180 tablet, Rfl: 3  •  fludrocortisone 0.1 MG tablet, Take 1 tablet by mouth Daily., Disp: 90 tablet, Rfl: 3  •  fluticasone (FLONASE) 50 MCG/ACT nasal spray, 2 sprays into each nostril daily. Administer 2 sprays in each nostril for each dose., Disp: 3 each, Rfl: 3  •  hydroxychloroquine  (PLAQUENIL) 200 MG tablet, Take 200 mg by mouth 2 (Two) Times a Day., Disp: , Rfl:   •  levothyroxine (SYNTHROID, LEVOTHROID) 75 MCG tablet, Take 1 tablet by mouth Daily., Disp: 90 tablet, Rfl: 3  •  magnesium chloride ER (MAG-DELAY) 535 (64 Mg) MG CR tablet, Take 1 tablet by mouth Daily., Disp: , Rfl:   •  methocarbamol (ROBAXIN) 750 MG tablet, Take 1 tablet by mouth Daily., Disp: 90 tablet, Rfl: 3  •  Multiple Vitamins-Minerals (MULTIVITAMIN ADULT PO), Take 1 tablet by mouth Daily., Disp: , Rfl:   •  olopatadine (PATANOL) 0.1 % ophthalmic solution, Administer 1 drop to both eyes 2 (Two) Times a Day As Needed., Disp: 5 mL, Rfl: 11  •  pantoprazole (PROTONIX) 40 MG EC tablet, Take 1 tablet by mouth 2 (two) times a day., Disp: 180 tablet, Rfl: 3  •  PARoxetine (PAXIL) 10 MG tablet, Take 1 tablet by mouth Every Morning., Disp: 30 tablet, Rfl: 5  •  pindolol (VISKEN) 5 MG tablet, Take 1 tablet by mouth 2 (Two) Times a Day., Disp: 60 tablet, Rfl: 5  •  Probiotic Product (PROBIOTIC ADVANCED PO), Take 1 capsule by mouth Daily., Disp: , Rfl:   •  Simethicone 250 MG capsule, Take 4 capsules by mouth Daily., Disp: , Rfl:   •  traZODone (DESYREL) 50 MG tablet, Take 1 tablet by mouth At Night As Needed for Sleep., Disp: 90 tablet, Rfl: 1         No follow-ups on file.

## 2021-09-30 ENCOUNTER — TELEPHONE (OUTPATIENT)
Dept: CARDIOLOGY | Facility: CLINIC | Age: 51
End: 2021-09-30

## 2021-09-30 PROBLEM — I10 SUPINE HYPERTENSION: Chronic | Status: ACTIVE | Noted: 2021-09-30

## 2021-10-01 NOTE — TELEPHONE ENCOUNTER
Notified patient of results. She verbalized understanding.    Cindy Can, RN  Triage [facility] [place]

## 2021-10-04 ENCOUNTER — OFFICE VISIT (OUTPATIENT)
Dept: CARDIOLOGY | Facility: CLINIC | Age: 51
End: 2021-10-04

## 2021-10-04 VITALS
SYSTOLIC BLOOD PRESSURE: 125 MMHG | DIASTOLIC BLOOD PRESSURE: 75 MMHG | HEART RATE: 75 BPM | BODY MASS INDEX: 25.73 KG/M2 | WEIGHT: 190 LBS | HEIGHT: 72 IN

## 2021-10-04 DIAGNOSIS — I95.1 AUTONOMIC ORTHOSTATIC HYPOTENSION: Primary | Chronic | ICD-10-CM

## 2021-10-04 DIAGNOSIS — K58.0 IRRITABLE BOWEL SYNDROME WITH DIARRHEA: Primary | ICD-10-CM

## 2021-10-04 DIAGNOSIS — I10 SUPINE HYPERTENSION: Chronic | ICD-10-CM

## 2021-10-04 PROCEDURE — 99442 PR PHYS/QHP TELEPHONE EVALUATION 11-20 MIN: CPT | Performed by: INTERNAL MEDICINE

## 2021-10-04 RX ORDER — VENLAFAXINE HYDROCHLORIDE 75 MG/1
75 CAPSULE, EXTENDED RELEASE ORAL DAILY
Qty: 30 CAPSULE | Refills: 5 | Status: SHIPPED | OUTPATIENT
Start: 2021-10-04 | End: 2021-10-13

## 2021-10-04 NOTE — PROGRESS NOTES
Subjective:     Encounter Date:10/04/21      Patient ID: Nadja Shipley is a 51 y.o. female.    Chief Complaint:  History of Present Illness    Dear Dr. Craig,    This patient has consented to a telehealth visit via audio. The visit was scheduled as a audio visit to comply with patient safety concerns in accordance with [facility] recommendations.  All vitals recorded within this visit are reported by the patient.  I spent  20 minutes in total including but not limited to the 11 minutes spent in direct conversation with this patient.     I had the pleasure of seeing this patient via telehealth today for follow-up of her autonomic dysfunction.    We started her on clonidine last week.  She is not showing any evidence of hypertension, and her diastolic pressures remain normal.  However she does not feel any better at all.  If anything she feels worse.  She feels very fatigued, very tired, and when she tried to do laundry she got severely diaphoretic and felt worse.    We saw her recently because of new onset of severe incapacitating orthostatic symptoms.  This started right after she received her Covid vaccination.  We for tilt table test that was positive for vasodepressor pathology.  Patient was started on Florinef but did not have any improvement in her orthostatic symptoms and develop exacerbation of supine hypertension.  Pindolol was then added.  She then called and said she was doing no better blood pressure still uncontrolled.  Clonidine was added and she was scheduled for appointment today.  She took her first dose yesterday in the morning blood pressure was lower in the afternoon so she did not take it blood pressure was very high this morning she took it is her second dose.  She has not noticed any improvement in her symptoms yet.    Patient has never been formally diagnosed but she carries a diagnosis of pots syndrome for many years.  She has had orthostatic symptoms for many years.  She has had rare  "episodes of syncope.  She has had these sensations and symptoms since she was a teenager.  She does have a daughter who has severe POTS syndrome and is treated for that.    Patient received the COVID vaccination in August and by the next day was feeling worse and about 10 days later became even more symptomatic.  She feels extremely lightheaded now anytime she is gets up or standing for any length of time.  She been checking her blood pressure with a wrist blood pressure cuff but that is yielding wildly disparate results, and we will check a today it is not reading accurately    The following portions of the patient's history were reviewed and updated as appropriate: allergies, current medications, past family history, past medical history, past social history, past surgical history and problem list.    Procedures       Objective:     Vitals:    10/04/21 1041   BP: 125/75   Pulse: 75   Weight: 86.2 kg (190 lb)   Height: 182.9 cm (72\")     Body mass index is 25.77 kg/m².      Vitals reviewed.   Constitutional:       General: Not in acute distress.     Appearance: Well-developed. Not diaphoretic.   Eyes:      General:         Right eye: No discharge.         Left eye: No discharge.      Conjunctiva/sclera: Conjunctivae normal.      Pupils: Pupils are equal, round, and reactive to light.   HENT:      Head: Normocephalic and atraumatic.      Nose: Nose normal.   Neck:      Thyroid: No thyromegaly.      Trachea: No tracheal deviation.      Lymphadenopathy: No cervical adenopathy.   Pulmonary:      Effort: Pulmonary effort is normal. No respiratory distress.      Breath sounds: Normal breath sounds. No stridor.   Chest:      Chest wall: Not tender to palpatation.   Cardiovascular:      Normal rate. Regular rhythm.      Murmurs: There is no murmur.      . No S3 gallop. No click. No rub.   Pulses:     Intact distal pulses.   Abdominal:      General: Bowel sounds are normal. There is no distension.      Palpations: Abdomen " is soft. There is no abdominal mass.      Tenderness: There is no abdominal tenderness. There is no guarding or rebound.   Musculoskeletal: Normal range of motion.         General: No tenderness or deformity.      Cervical back: Normal range of motion and neck supple. Skin:     General: Skin is warm and dry.      Findings: No erythema or rash.   Neurological:      Mental Status: Alert and oriented to person, place, and time.      Deep Tendon Reflexes: Reflexes are normal and symmetric.   Psychiatric:         Thought Content: Thought content normal.         Data and records reviewed:     Lab Results   Component Value Date    GLUCOSE 82 09/02/2021    BUN 14 09/02/2021    CREATININE 0.74 09/02/2021    EGFRIFNONA 83 09/02/2021    EGFRIFAFRI 97 08/17/2017    BCR 18.9 09/02/2021    K 3.7 09/02/2021    CO2 24.9 09/02/2021    CALCIUM 9.2 09/02/2021    PROTENTOTREF 7.6 08/17/2017    ALBUMIN 4.30 09/02/2021    LABIL2 1.5 08/17/2017    AST 26 09/02/2021    ALT 34 (H) 09/02/2021     Lab Results   Component Value Date    CHOL 246 (H) 11/05/2020    CHOL 234 (H) 10/31/2019    CHOL 209 (H) 09/06/2018     Lab Results   Component Value Date    TRIG 85 11/05/2020    TRIG 57 10/31/2019    TRIG 81 09/06/2018     Lab Results   Component Value Date    HDL 71 (H) 11/05/2020    HDL 72 (H) 10/31/2019    HDL 67 (H) 09/06/2018     Lab Results   Component Value Date     (H) 11/05/2020     (H) 10/31/2019     (H) 09/06/2018     Lab Results   Component Value Date    VLDL 14 11/05/2020    VLDL 11.4 10/31/2019    VLDL 16.2 09/06/2018     Lab Results   Component Value Date    LDLHDL 1.88 09/06/2018     CBC    CBC 11/5/20 9/2/21   WBC 5.81 5.68   RBC 4.60 4.48   Hemoglobin 14.2 14.8   Hematocrit 41.8 42.5   MCV 90.9 94.9   MCH 30.9 33.0   MCHC 34.0 34.8   RDW 12.0 (A) 12.4   Platelets 276 240   (A) Abnormal value            No radiology results for the last 90 days.          Assessment:          Diagnosis Plan   1. Autonomic  orthostatic hypotension     2. Supine hypertension            Plan:       1.  Dysautonomia with orthostatic hypotension, supine hypertension.  Supine hypertension is improved, no tachycardia response, but she feels if anything worse.  As we discussed paroxetine does not tend to be as helpful with autonomic dysfunction, venlafaxine many times does a better job, we will switch her over to that-I appreciate you taking care of this.  We will then follow-up with her in a week.  At this point patient still completely incapacitated and unable to function, both in personal life as well as from work-related aspect.  She has not yet cleared to return to work.    Thank you very much for allowing us to participate in the care of this pleasant patient.  Please don't hesitate to call if I can be of assistance in any way.      Current Outpatient Medications:   •  albuterol sulfate  (90 Base) MCG/ACT inhaler, Inhale 2 puffs by mouth into the lungs every 4 hours as needed for wheezing or coughing., Disp: 8.5 g, Rfl: 0  •  amitriptyline (ELAVIL) 50 MG tablet, Take 1 tablet by mouth every night at bedtime., Disp: 90 tablet, Rfl: 1  •  Ascorbic Acid (VITAMIN C PO), Take 1 tablet by mouth Daily., Disp: , Rfl:   •  baclofen (LIORESAL) 20 MG tablet, Take 1 tablet by mouth Every Night. (Patient taking differently: Take 20 mg by mouth As Needed.), Disp: 90 tablet, Rfl: 3  •  cetirizine (zyrTEC) 10 MG tablet, Take 10 mg by mouth Daily., Disp: , Rfl:   •  cloNIDine (Catapres) 0.1 MG tablet, Take 1 tablet by mouth 2 (Two) Times a Day., Disp: 60 tablet, Rfl: 5  •  Crisaborole (Eucrisa) 2 % ointment, Apply 1 application topically to the appropriate area as directed 2 (two) times a day., Disp: 60 g, Rfl: 4  •  desoximetasone (TOPICORT) 0.25 % cream, Apply  topically to the appropriate area as directed 2 (Two) Times a Day., Disp: 60 g, Rfl: 0  •  dicyclomine (BENTYL) 20 MG tablet, Take 1 tablet by mouth Every 6 (Six) Hours. (Patient taking  differently: Take 20 mg by mouth As Needed.), Disp: 60 tablet, Rfl: 3  •  famotidine (PEPCID) 40 MG tablet, Take 1 tablet by mouth 2 (Two) Times a Day., Disp: 180 tablet, Rfl: 3  •  fludrocortisone 0.1 MG tablet, Take 1 tablet by mouth Daily., Disp: 90 tablet, Rfl: 3  •  fluticasone (FLONASE) 50 MCG/ACT nasal spray, 2 sprays into each nostril daily. Administer 2 sprays in each nostril for each dose., Disp: 3 each, Rfl: 3  •  hydroxychloroquine (PLAQUENIL) 200 MG tablet, Take 200 mg by mouth 2 (Two) Times a Day., Disp: , Rfl:   •  levothyroxine (SYNTHROID, LEVOTHROID) 75 MCG tablet, Take 1 tablet by mouth Daily., Disp: 90 tablet, Rfl: 3  •  magnesium chloride ER (MAG-DELAY) 535 (64 Mg) MG CR tablet, Take 1 tablet by mouth Daily., Disp: , Rfl:   •  methocarbamol (ROBAXIN) 750 MG tablet, Take 1 tablet by mouth Daily., Disp: 90 tablet, Rfl: 3  •  Multiple Vitamins-Minerals (MULTIVITAMIN ADULT PO), Take 1 tablet by mouth Daily., Disp: , Rfl:   •  Multiple Vitamins-Minerals (ZINC PO), Take 1 tablet by mouth Daily., Disp: , Rfl:   •  olopatadine (PATANOL) 0.1 % ophthalmic solution, Administer 1 drop to both eyes 2 (Two) Times a Day As Needed., Disp: 5 mL, Rfl: 11  •  pantoprazole (PROTONIX) 40 MG EC tablet, Take 1 tablet by mouth 2 (two) times a day., Disp: 180 tablet, Rfl: 3  •  PARoxetine (PAXIL) 10 MG tablet, Take 1 tablet by mouth Every Morning., Disp: 30 tablet, Rfl: 5  •  pindolol (VISKEN) 5 MG tablet, Take 1 tablet by mouth 2 (Two) Times a Day., Disp: 60 tablet, Rfl: 5  •  Probiotic Product (PROBIOTIC ADVANCED PO), Take 1 capsule by mouth Daily., Disp: , Rfl:   •  Simethicone 250 MG capsule, Take 4 capsules by mouth Daily., Disp: , Rfl:   •  traZODone (DESYREL) 50 MG tablet, Take 1 tablet by mouth At Night As Needed for Sleep., Disp: 90 tablet, Rfl: 1         No follow-ups on file.

## 2021-10-04 NOTE — PROGRESS NOTES
I discussed with Dr. Bailey and patient.  D/T continued POTS symptoms, we'll switch from paroxetine to Venlafaxine XR 75 mg daily.

## 2021-10-11 ENCOUNTER — OFFICE VISIT (OUTPATIENT)
Dept: CARDIOLOGY | Facility: CLINIC | Age: 51
End: 2021-10-11

## 2021-10-11 VITALS
WEIGHT: 195 LBS | SYSTOLIC BLOOD PRESSURE: 128 MMHG | HEART RATE: 89 BPM | HEIGHT: 72 IN | BODY MASS INDEX: 26.41 KG/M2 | DIASTOLIC BLOOD PRESSURE: 94 MMHG

## 2021-10-11 DIAGNOSIS — I10 SUPINE HYPERTENSION: Primary | Chronic | ICD-10-CM

## 2021-10-11 DIAGNOSIS — I95.1 AUTONOMIC ORTHOSTATIC HYPOTENSION: Chronic | ICD-10-CM

## 2021-10-11 PROCEDURE — 99442 PR PHYS/QHP TELEPHONE EVALUATION 11-20 MIN: CPT | Performed by: INTERNAL MEDICINE

## 2021-10-11 NOTE — PROGRESS NOTES
Subjective:     Encounter Date:10/11/21      Patient ID: Nadja Shipley is a 51 y.o. female.    Chief Complaint:  History of Present Illness    Dear Dr. Craig,    This patient has consented to a telehealth visit via audio. The visit was scheduled as a audio visit to comply with patient safety concerns in accordance with CDC recommendations.  All vitals recorded within this visit are reported by the patient.  I spent  18 minutes in total including but not limited to the 11 minutes spent in direct conversation with this patient.     I had the pleasure of seeing this patient via telehealth today for follow-up of her autonomic dysfunction.    This is a 1 week follow-up for her autonomic dysfunction and multitude of symptoms including brain fog when sitting or standing, dizziness, lightheadedness,.    Over the last week she really is not getting any symptomatic improvement.  She is laying flat she generally feels okay.  If she sits up for any length the time she starts getting some brain fog and if she stands up she gets lightheaded.  Supine blood pressure has been reasonably well controlled, she still sees out of 30 mm drop on standing although her awais systolic blood pressure still in the 120s.  She has not noticed any tachycardia at this point.  She is also developing tingling across her upper thorax in both arms.  This is a new finding.  Last week she was switched over to venlafaxine which many times will be more beneficial for autonomic dysfunction but she has not noticed any improvement with that.    We saw her recently because of new onset of severe incapacitating orthostatic symptoms.  This started right after she received her Covid vaccination.  We for tilt table test that was positive for vasodepressor pathology.  Patient was started on Florinef but did not have any improvement in her orthostatic symptoms and develop exacerbation of supine hypertension.  Pindolol was then added.  She then called and  "said she was doing no better blood pressure still uncontrolled.  Clonidine was added and she was scheduled for appointment today.  She took her first dose yesterday in the morning blood pressure was lower in the afternoon so she did not take it blood pressure was very high this morning she took it is her second dose.  She has not noticed any improvement in her symptoms yet.    Patient has never been formally diagnosed but she carries a diagnosis of pots syndrome for many years.  She has had orthostatic symptoms for many years.  She has had rare episodes of syncope.  She has had these sensations and symptoms since she was a teenager.  She does have a daughter who has severe POTS syndrome and is treated for that.    Patient received the COVID vaccination in August and by the next day was feeling worse and about 10 days later became even more symptomatic.  She feels extremely lightheaded now anytime she is gets up or standing for any length of time.  She been checking her blood pressure with a wrist blood pressure cuff but that is yielding wildly disparate results, and we will check a today it is not reading accurately    The following portions of the patient's history were reviewed and updated as appropriate: allergies, current medications, past family history, past medical history, past social history, past surgical history and problem list.    Procedures       Objective:     Vitals:    10/11/21 1058 10/11/21 1102   BP: 150/99 128/94   Patient Position: Lying Standing   Pulse: 73 89   Weight: 88.5 kg (195 lb)    Height: 182.9 cm (72\")      Body mass index is 26.45 kg/m².     Alert and oriented x3, thought processes normal, judgment normal, speaking in full sentences with no wheezing and no respiratory distress. Hearing is appropriate without difficulty.  Data and records reviewed:     Lab Results   Component Value Date    GLUCOSE 82 09/02/2021    BUN 14 09/02/2021    CREATININE 0.74 09/02/2021    EGFRIFNONA 83 " 09/02/2021    EGFRIFAFRI 97 08/17/2017    BCR 18.9 09/02/2021    K 3.7 09/02/2021    CO2 24.9 09/02/2021    CALCIUM 9.2 09/02/2021    PROTENTOTREF 7.6 08/17/2017    ALBUMIN 4.30 09/02/2021    LABIL2 1.5 08/17/2017    AST 26 09/02/2021    ALT 34 (H) 09/02/2021     Lab Results   Component Value Date    CHOL 246 (H) 11/05/2020    CHOL 234 (H) 10/31/2019    CHOL 209 (H) 09/06/2018     Lab Results   Component Value Date    TRIG 85 11/05/2020    TRIG 57 10/31/2019    TRIG 81 09/06/2018     Lab Results   Component Value Date    HDL 71 (H) 11/05/2020    HDL 72 (H) 10/31/2019    HDL 67 (H) 09/06/2018     Lab Results   Component Value Date     (H) 11/05/2020     (H) 10/31/2019     (H) 09/06/2018     Lab Results   Component Value Date    VLDL 14 11/05/2020    VLDL 11.4 10/31/2019    VLDL 16.2 09/06/2018     Lab Results   Component Value Date    LDLHDL 1.88 09/06/2018     CBC    CBC 11/5/20 9/2/21   WBC 5.81 5.68   RBC 4.60 4.48   Hemoglobin 14.2 14.8   Hematocrit 41.8 42.5   MCV 90.9 94.9   MCH 30.9 33.0   MCHC 34.0 34.8   RDW 12.0 (A) 12.4   Platelets 276 240   (A) Abnormal value            No radiology results for the last 90 days.          Assessment:          Diagnosis Plan   1. Supine hypertension     2. Autonomic orthostatic hypotension            Plan:       1.  Dysautonomia with orthostatic hypotension, supine hypertension.  Supine hypertension remains reasonably well controlled but she is still highly symptomatic even with sitting.  And whenever stop the pindolol today since we no longer having the issues with the supine hypertension with the clonidine nor are we seen autonomic hyperactivity symptoms at this point.  I will have her call me back in a couple of days and let me know how she feels.  If still symptomatic we will add midodrine on.  At this point patient still completely incapacitated and unable to function, both in personal life as well as from work-related aspect.  She has not yet  cleared to return to work.  2.  Patient now is developing diffuse tingling on both upper extremities and upper thorax.  She really has not responded to any therapy we have initiated so far.  I would consider neurology consult.  She also asked about seeing rheumatology given to potential autoimmune issues and that certainly seems reasonable as well.    Thank you very much for allowing us to participate in the care of this pleasant patient.  Please don't hesitate to call if I can be of assistance in any way.      Current Outpatient Medications:   •  albuterol sulfate  (90 Base) MCG/ACT inhaler, Inhale 2 puffs by mouth into the lungs every 4 hours as needed for wheezing or coughing., Disp: 8.5 g, Rfl: 0  •  amitriptyline (ELAVIL) 50 MG tablet, Take 1 tablet by mouth every night at bedtime., Disp: 90 tablet, Rfl: 1  •  Ascorbic Acid (VITAMIN C PO), Take 1 tablet by mouth Daily., Disp: , Rfl:   •  baclofen (LIORESAL) 20 MG tablet, Take 1 tablet by mouth Every Night. (Patient taking differently: Take 20 mg by mouth As Needed.), Disp: 90 tablet, Rfl: 3  •  cetirizine (zyrTEC) 10 MG tablet, Take 10 mg by mouth Daily., Disp: , Rfl:   •  cloNIDine (Catapres) 0.1 MG tablet, Take 1 tablet by mouth 2 (Two) Times a Day., Disp: 60 tablet, Rfl: 5  •  Crisaborole (Eucrisa) 2 % ointment, Apply 1 application topically to the appropriate area as directed 2 (two) times a day., Disp: 60 g, Rfl: 4  •  desoximetasone (TOPICORT) 0.25 % cream, Apply  topically to the appropriate area as directed 2 (Two) Times a Day., Disp: 60 g, Rfl: 0  •  dicyclomine (BENTYL) 20 MG tablet, Take 1 tablet by mouth Every 6 (Six) Hours. (Patient taking differently: Take 20 mg by mouth As Needed.), Disp: 60 tablet, Rfl: 3  •  famotidine (PEPCID) 40 MG tablet, Take 1 tablet by mouth 2 (Two) Times a Day., Disp: 180 tablet, Rfl: 3  •  fludrocortisone 0.1 MG tablet, Take 1 tablet by mouth Daily., Disp: 90 tablet, Rfl: 3  •  fluticasone (FLONASE) 50 MCG/ACT  nasal spray, 2 sprays into each nostril daily. Administer 2 sprays in each nostril for each dose., Disp: 3 each, Rfl: 3  •  hydroxychloroquine (PLAQUENIL) 200 MG tablet, Take 200 mg by mouth 2 (Two) Times a Day., Disp: , Rfl:   •  levothyroxine (SYNTHROID, LEVOTHROID) 75 MCG tablet, Take 1 tablet by mouth Daily., Disp: 90 tablet, Rfl: 3  •  magnesium chloride ER (MAG-DELAY) 535 (64 Mg) MG CR tablet, Take 1 tablet by mouth Daily., Disp: , Rfl:   •  methocarbamol (ROBAXIN) 750 MG tablet, Take 1 tablet by mouth Daily., Disp: 90 tablet, Rfl: 3  •  Multiple Vitamins-Minerals (MULTIVITAMIN ADULT PO), Take 1 tablet by mouth Daily., Disp: , Rfl:   •  Multiple Vitamins-Minerals (ZINC PO), Take 1 tablet by mouth Daily., Disp: , Rfl:   •  olopatadine (PATANOL) 0.1 % ophthalmic solution, Administer 1 drop to both eyes 2 (Two) Times a Day As Needed., Disp: 5 mL, Rfl: 11  •  pantoprazole (PROTONIX) 40 MG EC tablet, Take 1 tablet by mouth 2 (two) times a day., Disp: 180 tablet, Rfl: 3  •  pindolol (VISKEN) 5 MG tablet, Take 1 tablet by mouth 2 (Two) Times a Day., Disp: 60 tablet, Rfl: 5  •  Probiotic Product (PROBIOTIC ADVANCED PO), Take 1 capsule by mouth Daily., Disp: , Rfl:   •  Simethicone 250 MG capsule, Take 4 capsules by mouth Daily., Disp: , Rfl:   •  traZODone (DESYREL) 50 MG tablet, Take 1 tablet by mouth At Night As Needed for Sleep., Disp: 90 tablet, Rfl: 1  •  venlafaxine XR (Effexor XR) 75 MG 24 hr capsule, Take 1 capsule by mouth Daily., Disp: 30 capsule, Rfl: 5         No follow-ups on file.

## 2021-10-13 ENCOUNTER — TELEPHONE (OUTPATIENT)
Dept: CARDIOLOGY | Facility: CLINIC | Age: 51
End: 2021-10-13

## 2021-10-13 RX ORDER — PAROXETINE 10 MG/1
20 TABLET, FILM COATED ORAL EVERY MORNING
Qty: 60 TABLET | Refills: 5 | Status: SHIPPED | OUTPATIENT
Start: 2021-10-13 | End: 2022-03-22 | Stop reason: SDUPTHER

## 2021-10-13 NOTE — TELEPHONE ENCOUNTER
Paty Floyd nurse  with work comp called to request the last two office notes along with a referral to Neurology and Rheumatology.     The pt was going to call her PCP to make an appointment to discuss these problems but  does not accept worker comp.     Paty is wanting to know if you can place the referral or does the referral have to come from her PCP?    Tyra #: [phone]

## 2021-10-14 DIAGNOSIS — G90.A POTS (POSTURAL ORTHOSTATIC TACHYCARDIA SYNDROME): Primary | ICD-10-CM

## 2021-10-15 DIAGNOSIS — I95.1 AUTONOMIC ORTHOSTATIC HYPOTENSION: Primary | ICD-10-CM

## 2021-10-15 RX ORDER — MIDODRINE HYDROCHLORIDE 2.5 MG/1
2.5 TABLET ORAL
Qty: 90 TABLET | Refills: 3 | Status: SHIPPED | OUTPATIENT
Start: 2021-10-15 | End: 2021-11-11

## 2021-10-17 RX ORDER — PANTOPRAZOLE SODIUM 40 MG/1
40 TABLET, DELAYED RELEASE ORAL 2 TIMES DAILY
Qty: 180 TABLET | Refills: 3 | Status: SHIPPED | OUTPATIENT
Start: 2021-10-17 | End: 2022-08-23 | Stop reason: SDUPTHER

## 2021-10-17 RX ORDER — FAMOTIDINE 40 MG/1
40 TABLET, FILM COATED ORAL 2 TIMES DAILY
Qty: 180 TABLET | Refills: 3 | Status: SHIPPED | OUTPATIENT
Start: 2021-10-17 | End: 2022-08-23 | Stop reason: SDUPTHER

## 2021-11-11 ENCOUNTER — OFFICE VISIT (OUTPATIENT)
Dept: INTERNAL MEDICINE | Facility: CLINIC | Age: 51
End: 2021-11-11

## 2021-11-11 VITALS
DIASTOLIC BLOOD PRESSURE: 80 MMHG | TEMPERATURE: 97.8 F | WEIGHT: 195 LBS | HEART RATE: 87 BPM | OXYGEN SATURATION: 99 % | BODY MASS INDEX: 26.41 KG/M2 | RESPIRATION RATE: 20 BRPM | SYSTOLIC BLOOD PRESSURE: 132 MMHG | HEIGHT: 72 IN

## 2021-11-11 DIAGNOSIS — I95.1 AUTONOMIC ORTHOSTATIC HYPOTENSION: Chronic | ICD-10-CM

## 2021-11-11 DIAGNOSIS — Z00.00 ANNUAL PHYSICAL EXAM: Primary | ICD-10-CM

## 2021-11-11 PROCEDURE — 99396 PREV VISIT EST AGE 40-64: CPT | Performed by: FAMILY MEDICINE

## 2021-11-11 RX ORDER — PYRIDOSTIGMINE BROMIDE 60 MG/1
30 TABLET ORAL 3 TIMES DAILY
Qty: 45 TABLET | Refills: 5 | Status: SHIPPED | OUTPATIENT
Start: 2021-11-11 | End: 2021-11-24 | Stop reason: SDUPTHER

## 2021-11-11 RX ORDER — MODAFINIL 100 MG/1
100 TABLET ORAL DAILY
Qty: 30 TABLET | Refills: 5 | Status: SHIPPED | OUTPATIENT
Start: 2021-11-11 | End: 2022-02-17

## 2021-11-11 NOTE — PROGRESS NOTES
Chief Complaint   Patient presents with   • Annual Exam   • Hypotension     POTS       Patient Name: Nadja Shipley    SUBJECTIVE    Nadja is a 51 y.o. female presenting for Annual Exam and Hypotension (POTS)      Well Adult Physical   Patient here for a comprehensive physical exam.The patient reports problems - POTS    Do you take any herbs or supplements that were not prescribed by a doctor? no   Are you taking calcium supplements? no   Are you taking aspirin daily? no     History:  Any STD's in the past? none    Nadja Shipley 51 y.o. female who presents for an Annual Wellness Visit.  she has a history of   Patient Active Problem List   Diagnosis   • Irritable bowel syndrome   • Chronic neck pain   • Rosacea   • Insomnia   • Annual physical exam   • Hot flashes   • Screening for colon cancer   • History of colon polyps   • Irritable bowel syndrome with diarrhea   • Autonomic orthostatic hypotension   • Supine hypertension   .  she has been doing well with new interval problems.      Health Habits:  Dental Exam. up to date  Eye Exam. up to date  Exercise: 5 times/week.  Current exercise activities include: bicycling outdoors and walking    Menstrual history:  Last pap date:   Abnormal pap?   Mammogram:  Dexa:  Colonoscopy:  Tob use:  EtOH use:  Qualifies for lung Ca screening?      The following portions of the patient's history were reviewed and updated as appropriate: allergies, current medications, past family history, past medical history, past social history, past surgical history and problem list.    Review of Systems    Lanolin      Current Outpatient Medications:   •  albuterol sulfate  (90 Base) MCG/ACT inhaler, Inhale 2 puffs by mouth into the lungs every 4 hours as needed for wheezing or coughing., Disp: 8.5 g, Rfl: 0  •  amitriptyline (ELAVIL) 50 MG tablet, Take 1 tablet by mouth every night at bedtime., Disp: 90 tablet, Rfl: 1  •  Ascorbic Acid (VITAMIN C PO), Take 1 tablet by mouth Daily.,  Disp: , Rfl:   •  baclofen (LIORESAL) 20 MG tablet, Take 1 tablet by mouth Every Night. (Patient taking differently: Take 20 mg by mouth As Needed.), Disp: 90 tablet, Rfl: 3  •  cetirizine (zyrTEC) 10 MG tablet, Take 10 mg by mouth Daily., Disp: , Rfl:   •  cloNIDine (Catapres) 0.1 MG tablet, Take 1 tablet by mouth 2 (Two) Times a Day., Disp: 60 tablet, Rfl: 5  •  Crisaborole (Eucrisa) 2 % ointment, Apply 1 application topically to the appropriate area as directed 2 (two) times a day., Disp: 60 g, Rfl: 4  •  desoximetasone (TOPICORT) 0.25 % cream, Apply  topically to the appropriate area as directed 2 (Two) Times a Day., Disp: 60 g, Rfl: 0  •  dicyclomine (BENTYL) 20 MG tablet, Take 1 tablet by mouth Every 6 (Six) Hours. (Patient taking differently: Take 20 mg by mouth As Needed.), Disp: 60 tablet, Rfl: 3  •  famotidine (PEPCID) 40 MG tablet, Take 1 tablet by mouth 2 (Two) Times a Day., Disp: 180 tablet, Rfl: 3  •  fluticasone (FLONASE) 50 MCG/ACT nasal spray, 2 sprays into each nostril daily. Administer 2 sprays in each nostril for each dose., Disp: 3 each, Rfl: 3  •  hydroxychloroquine (PLAQUENIL) 200 MG tablet, Take 200 mg by mouth 2 (Two) Times a Day., Disp: , Rfl:   •  hydroxychloroquine (PLAQUENIL) 200 MG tablet, Take 1 tablet by mouth 2 (two) times a day., Disp: 60 tablet, Rfl: 0  •  levothyroxine (SYNTHROID, LEVOTHROID) 75 MCG tablet, Take 1 tablet by mouth Daily., Disp: 90 tablet, Rfl: 3  •  magnesium chloride ER (MAG-DELAY) 535 (64 Mg) MG CR tablet, Take 1 tablet by mouth Daily., Disp: , Rfl:   •  methocarbamol (ROBAXIN) 750 MG tablet, Take 1 tablet by mouth Daily., Disp: 90 tablet, Rfl: 3  •  Multiple Vitamins-Minerals (MULTIVITAMIN ADULT PO), Take 1 tablet by mouth Daily., Disp: , Rfl:   •  Multiple Vitamins-Minerals (ZINC PO), Take 1 tablet by mouth Daily., Disp: , Rfl:   •  olopatadine (PATANOL) 0.1 % ophthalmic solution, Administer 1 drop to both eyes 2 (Two) Times a Day As Needed., Disp: 5 mL, Rfl:  "11  •  pantoprazole (PROTONIX) 40 MG EC tablet, Take 1 tablet by mouth 2 (two) times a day., Disp: 180 tablet, Rfl: 3  •  PARoxetine (Paxil) 10 MG tablet, Take 2 tablets by mouth Every Morning., Disp: 60 tablet, Rfl: 5  •  Probiotic Product (PROBIOTIC ADVANCED PO), Take 1 capsule by mouth Daily., Disp: , Rfl:   •  Simethicone 250 MG capsule, Take 4 capsules by mouth Daily., Disp: , Rfl:   •  traZODone (DESYREL) 50 MG tablet, Take 1 tablet by mouth At Night As Needed for Sleep., Disp: 90 tablet, Rfl: 1  •  modafinil (PROVIGIL) 100 MG tablet, Take 1 tablet by mouth Daily., Disp: 30 tablet, Rfl: 5  •  pyridostigmine (MESTINON) 60 MG tablet, Take one-half tablet by mouth 3 (Three) Times a Day., Disp: 45 tablet, Rfl: 5    OBJECTIVE    /80   Pulse 87   Temp 97.8 °F (36.6 °C) (Temporal)   Resp 20   Ht 182.9 cm (72.01\")   Wt 88.5 kg (195 lb)   SpO2 99%   BMI 26.44 kg/m²     Physical Exam    Common labs    Common Labsle 9/2/21 9/2/21    1512 1512   Glucose  82   BUN  14   Creatinine  0.74   eGFR Non African Am  83   Sodium  136   Potassium  3.7   Chloride  100   Calcium  9.2   Albumin  4.30   Total Bilirubin  0.3   Alkaline Phosphatase  99   AST (SGOT)  26   ALT (SGPT)  34 (A)   WBC 5.68    Hemoglobin 14.8    Hematocrit 42.5    Platelets 240    (A) Abnormal value               [unfilled]    ASSESSMENT AND PLAN  Diagnoses and all orders for this visit:    1. Annual physical exam (Primary)    2. Autonomic orthostatic hypotension  -     pyridostigmine (MESTINON) 60 MG tablet; Take one-half tablet by mouth 3 (Three) Times a Day.  Dispense: 45 tablet; Refill: 5  -     modafinil (PROVIGIL) 100 MG tablet; Take 1 tablet by mouth Daily.  Dispense: 30 tablet; Refill: 5      Tdap UTD.  Covid-19 vaccine done.  Mammogram due next month.      Pt waiting to get into [facility], operated by [facility] but doesn't have an appointment yet.  She has tried several treatments with Dr. Bailey with haven't been effective, with the exception " of clonidine which has helped control her blood pressure.  Some studies have shown symptomatic improvement with pyridostigmine and modafinil.  I think it would be reasonable to try these separately, starting with the pyridostigmine.  She is still quite dysfunctional and unable to work.  She feels okay for 2 hours first thing in the morning, during which time she exercises.    Discussed healthy diet, exercise, cancer screening, immunizations, and preventive care.  Hm tab updated.  Encouraged seat belt use.  No texting while driving. Orders placed as below.     Encouraged covid vaccination if not already done.  Covid vaccination can be scheduled at [email or web address]    continue current medications and return for routine annual checkups    [unfilled]    No follow-ups on file.

## 2021-11-15 ENCOUNTER — TELEPHONE (OUTPATIENT)
Dept: INTERNAL MEDICINE | Facility: CLINIC | Age: 51
End: 2021-11-15

## 2021-11-15 ENCOUNTER — DOCUMENTATION (OUTPATIENT)
Dept: INTERNAL MEDICINE | Facility: CLINIC | Age: 51
End: 2021-11-15

## 2021-11-15 NOTE — TELEPHONE ENCOUNTER
Yes, we can give her a medical exemption for flu vaccine due to POTS & reaction to recent Covid-19 vaccine.

## 2021-11-15 NOTE — TELEPHONE ENCOUNTER
Caller: Nadja Shipley    Relationship: Self    Best call back number: [phone]    What form or medical record are you requesting: FLU EXEMPTION     Who is requesting this form or medical record from you: WORK     How would you like to receive the form or medical records (pick-up, mail, fax):   If fax, what is the fax number: [phone]      Timeframe paperwork needed: ASAP     Additional notes: PT STATED THAT SHE IS NEEDING IT DUE TO THE NERVE ISSUES WITH THE PAST COVID 19 SHOT SHE RECEIVED. SHE STATED THAT MD GENTILE IS AWARE OF HER ISSUES

## 2021-11-24 DIAGNOSIS — I95.1 AUTONOMIC ORTHOSTATIC HYPOTENSION: Chronic | ICD-10-CM

## 2021-11-24 RX ORDER — PYRIDOSTIGMINE BROMIDE 60 MG/1
60 TABLET ORAL 3 TIMES DAILY
Qty: 90 TABLET | Refills: 5 | Status: SHIPPED | OUTPATIENT
Start: 2021-11-24 | End: 2022-02-17 | Stop reason: SDUPTHER

## 2021-11-25 ENCOUNTER — DOCUMENTATION (OUTPATIENT)
Dept: GASTROENTEROLOGY | Facility: CLINIC | Age: 51
End: 2021-11-25

## 2021-12-20 ENCOUNTER — TELEPHONE (OUTPATIENT)
Dept: CARDIOLOGY | Facility: CLINIC | Age: 51
End: 2021-12-20

## 2021-12-20 NOTE — TELEPHONE ENCOUNTER
Rayna [phone] from [facility] called she is her  for work comp claim. She is trying to get her scheduled for the [facility] you referred her to and they booked out till at least late feb. She was thinking you may want to get her back in office in the meantime. Please let us know if you would like us to get her on your schedule .    Thanks  Cassidy

## 2021-12-21 NOTE — TELEPHONE ENCOUNTER
Dr. Hunter has kept me informed, spinal think that she has to have an appointment to see us between now and when she gets to [place], but if she would like to get in to be seen would be happy to do so

## 2022-01-06 DIAGNOSIS — M54.2 CHRONIC NECK PAIN: ICD-10-CM

## 2022-01-06 DIAGNOSIS — E03.9 HYPOTHYROIDISM, UNSPECIFIED TYPE: ICD-10-CM

## 2022-01-06 DIAGNOSIS — G89.29 CHRONIC NECK PAIN: ICD-10-CM

## 2022-01-06 RX ORDER — TRAZODONE HYDROCHLORIDE 50 MG/1
50 TABLET ORAL NIGHTLY PRN
Qty: 90 TABLET | Refills: 1 | Status: SHIPPED | OUTPATIENT
Start: 2022-01-06 | End: 2022-08-23 | Stop reason: SDUPTHER

## 2022-01-06 RX ORDER — LEVOTHYROXINE SODIUM 0.07 MG/1
75 TABLET ORAL DAILY
Qty: 90 TABLET | Refills: 3 | Status: SHIPPED | OUTPATIENT
Start: 2022-01-06 | End: 2022-12-30 | Stop reason: SDUPTHER

## 2022-01-06 RX ORDER — METHOCARBAMOL 750 MG/1
750 TABLET, FILM COATED ORAL DAILY
Qty: 90 TABLET | Refills: 3 | Status: SHIPPED | OUTPATIENT
Start: 2022-01-06 | End: 2022-12-30 | Stop reason: SDUPTHER

## 2022-01-06 NOTE — TELEPHONE ENCOUNTER
This is a medication that cannot be approved by clinical staff. It is not in scope.     Pt last seen: 11/11/2021  Pt next appt: 11/14/2022  Last Filled: 06/25/2021     medication pended, pharmacy verified.

## 2022-01-28 ENCOUNTER — APPOINTMENT (OUTPATIENT)
Dept: GENERAL RADIOLOGY | Facility: HOSPITAL | Age: 52
End: 2022-01-28

## 2022-01-28 ENCOUNTER — HOSPITAL ENCOUNTER (EMERGENCY)
Facility: HOSPITAL | Age: 52
Discharge: HOME | End: 2022-01-28
Attending: EMERGENCY MEDICINE | Admitting: EMERGENCY MEDICINE

## 2022-01-28 VITALS
HEIGHT: 72 IN | HEART RATE: 64 BPM | BODY MASS INDEX: 27.09 KG/M2 | DIASTOLIC BLOOD PRESSURE: 94 MMHG | RESPIRATION RATE: 18 BRPM | WEIGHT: 200 LBS | OXYGEN SATURATION: 93 % | TEMPERATURE: 98.1 F | SYSTOLIC BLOOD PRESSURE: 139 MMHG

## 2022-01-28 DIAGNOSIS — R06.02 SHORTNESS OF BREATH: ICD-10-CM

## 2022-01-28 DIAGNOSIS — R07.89 CHEST PAIN, ATYPICAL: ICD-10-CM

## 2022-01-28 DIAGNOSIS — R07.89 OTHER CHEST PAIN: ICD-10-CM

## 2022-01-28 DIAGNOSIS — G90.A POTS (POSTURAL ORTHOSTATIC TACHYCARDIA SYNDROME): Primary | ICD-10-CM

## 2022-01-28 DIAGNOSIS — R06.02 SOB (SHORTNESS OF BREATH): ICD-10-CM

## 2022-01-28 LAB
ALBUMIN SERPL-MCNC: 4 G/DL (ref 3.5–5.2)
ALBUMIN/GLOB SERPL: 1.4 G/DL
ALP SERPL-CCNC: 133 U/L (ref 39–117)
ALT SERPL W P-5'-P-CCNC: 51 U/L (ref 1–33)
ANION GAP SERPL CALCULATED.3IONS-SCNC: 10.1 MMOL/L (ref 5–15)
AST SERPL-CCNC: 36 U/L (ref 1–32)
BASOPHILS # BLD AUTO: 0.06 10*3/MM3 (ref 0–0.2)
BASOPHILS NFR BLD AUTO: 1 % (ref 0–1.5)
BILIRUB SERPL-MCNC: 0.3 MG/DL (ref 0–1.2)
BUN SERPL-MCNC: 22 MG/DL (ref 6–20)
BUN/CREAT SERPL: 28.6 (ref 7–25)
CALCIUM SPEC-SCNC: 9.2 MG/DL (ref 8.6–10.5)
CHLORIDE SERPL-SCNC: 101 MMOL/L (ref 98–107)
CO2 SERPL-SCNC: 27.9 MMOL/L (ref 22–29)
CREAT SERPL-MCNC: 0.77 MG/DL (ref 0.57–1)
DEPRECATED RDW RBC AUTO: 44.4 FL (ref 37–54)
EOSINOPHIL # BLD AUTO: 0.87 10*3/MM3 (ref 0–0.4)
EOSINOPHIL NFR BLD AUTO: 13.9 % (ref 0.3–6.2)
ERYTHROCYTE [DISTWIDTH] IN BLOOD BY AUTOMATED COUNT: 12.8 % (ref 12.3–15.4)
GFR SERPL CREATININE-BSD FRML MDRD: 80 ML/MIN/1.73
GLOBULIN UR ELPH-MCNC: 2.8 GM/DL
GLUCOSE SERPL-MCNC: 105 MG/DL (ref 65–99)
HCT VFR BLD AUTO: 41.3 % (ref 34–46.6)
HGB BLD-MCNC: 13.6 G/DL (ref 12–15.9)
HOLD SPECIMEN: NORMAL
HOLD SPECIMEN: NORMAL
IMM GRANULOCYTES # BLD AUTO: 0.02 10*3/MM3 (ref 0–0.05)
IMM GRANULOCYTES NFR BLD AUTO: 0.3 % (ref 0–0.5)
LYMPHOCYTES # BLD AUTO: 1.75 10*3/MM3 (ref 0.7–3.1)
LYMPHOCYTES NFR BLD AUTO: 27.9 % (ref 19.6–45.3)
MCH RBC QN AUTO: 31.1 PG (ref 26.6–33)
MCHC RBC AUTO-ENTMCNC: 32.9 G/DL (ref 31.5–35.7)
MCV RBC AUTO: 94.5 FL (ref 79–97)
MONOCYTES # BLD AUTO: 0.48 10*3/MM3 (ref 0.1–0.9)
MONOCYTES NFR BLD AUTO: 7.6 % (ref 5–12)
NEUTROPHILS NFR BLD AUTO: 3.1 10*3/MM3 (ref 1.7–7)
NEUTROPHILS NFR BLD AUTO: 49.3 % (ref 42.7–76)
NRBC BLD AUTO-RTO: 0 /100 WBC (ref 0–0.2)
PLATELET # BLD AUTO: 205 10*3/MM3 (ref 140–450)
PMV BLD AUTO: 9.8 FL (ref 6–12)
POTASSIUM SERPL-SCNC: 4 MMOL/L (ref 3.5–5.2)
PROT SERPL-MCNC: 6.8 G/DL (ref 6–8.5)
QT INTERVAL: 409 MS
RBC # BLD AUTO: 4.37 10*6/MM3 (ref 3.77–5.28)
SODIUM SERPL-SCNC: 139 MMOL/L (ref 136–145)
TROPONIN T SERPL-MCNC: <0.01 NG/ML (ref 0–0.03)
WBC NRBC COR # BLD: 6.28 10*3/MM3 (ref 3.4–10.8)
WHOLE BLOOD HOLD SPECIMEN: NORMAL
WHOLE BLOOD HOLD SPECIMEN: NORMAL

## 2022-01-28 PROCEDURE — 93005 ELECTROCARDIOGRAM TRACING: CPT

## 2022-01-28 PROCEDURE — 71045 X-RAY EXAM CHEST 1 VIEW: CPT

## 2022-01-28 PROCEDURE — 99283 EMERGENCY DEPT VISIT LOW MDM: CPT | Performed by: NURSE PRACTITIONER

## 2022-01-28 PROCEDURE — 93005 ELECTROCARDIOGRAM TRACING: CPT | Performed by: EMERGENCY MEDICINE

## 2022-01-28 PROCEDURE — 85025 COMPLETE CBC W/AUTO DIFF WBC: CPT | Performed by: NURSE PRACTITIONER

## 2022-01-28 PROCEDURE — 80053 COMPREHEN METABOLIC PANEL: CPT | Performed by: NURSE PRACTITIONER

## 2022-01-28 PROCEDURE — 93010 ELECTROCARDIOGRAM REPORT: CPT | Performed by: INTERNAL MEDICINE

## 2022-01-28 PROCEDURE — 84484 ASSAY OF TROPONIN QUANT: CPT | Performed by: NURSE PRACTITIONER

## 2022-01-28 PROCEDURE — 99284 EMERGENCY DEPT VISIT MOD MDM: CPT

## 2022-01-28 RX ORDER — METHYLDOPA/HYDROCHLOROTHIAZIDE 250MG-25MG
500 TABLET ORAL
COMMUNITY
End: 2022-08-18

## 2022-01-28 RX ORDER — SODIUM CHLORIDE 1000 MG
1 TABLET, SOLUBLE MISCELLANEOUS 3 TIMES DAILY
COMMUNITY
End: 2022-08-18

## 2022-01-28 RX ORDER — DOCUSATE SODIUM 250 MG
250 CAPSULE ORAL DAILY
COMMUNITY
End: 2022-09-21

## 2022-01-28 RX ORDER — SODIUM CHLORIDE 0.9 % (FLUSH) 0.9 %
10 SYRINGE (ML) INJECTION AS NEEDED
Status: DISCONTINUED | OUTPATIENT
Start: 2022-01-28 | End: 2022-01-28 | Stop reason: HOSPADM

## 2022-01-28 RX ORDER — ACETAMINOPHEN 500 MG
500 TABLET ORAL EVERY 6 HOURS PRN
COMMUNITY
End: 2022-08-18

## 2022-01-28 RX ORDER — MULTIPLE VITAMINS W/ MINERALS TAB 9MG-400MCG
3 TAB ORAL DAILY
COMMUNITY
End: 2022-08-18

## 2022-02-17 ENCOUNTER — OFFICE VISIT (OUTPATIENT)
Dept: INTERNAL MEDICINE | Facility: CLINIC | Age: 52
End: 2022-02-17

## 2022-02-17 VITALS
HEART RATE: 86 BPM | SYSTOLIC BLOOD PRESSURE: 124 MMHG | RESPIRATION RATE: 20 BRPM | DIASTOLIC BLOOD PRESSURE: 78 MMHG | TEMPERATURE: 97.8 F | WEIGHT: 204 LBS | BODY MASS INDEX: 27.63 KG/M2 | OXYGEN SATURATION: 99 % | HEIGHT: 72 IN

## 2022-02-17 DIAGNOSIS — I95.1 AUTONOMIC ORTHOSTATIC HYPOTENSION: Primary | Chronic | ICD-10-CM

## 2022-02-17 DIAGNOSIS — R07.9 CHEST PAIN, UNSPECIFIED TYPE: ICD-10-CM

## 2022-02-17 PROCEDURE — 99213 OFFICE O/P EST LOW 20 MIN: CPT | Performed by: FAMILY MEDICINE

## 2022-02-17 RX ORDER — PYRIDOSTIGMINE BROMIDE 60 MG/1
120 TABLET ORAL 3 TIMES DAILY
Qty: 540 TABLET | Refills: 1 | Status: SHIPPED | OUTPATIENT
Start: 2022-02-17 | End: 2022-05-18 | Stop reason: SDUPTHER

## 2022-02-17 NOTE — PROGRESS NOTES
"Subjective   Nadja Shipley is a 51 y.o. female presenting today for follow up of   Chief Complaint   Patient presents with   • Hospital Follow Up Visit   • Chest Pain     POTS       History of Present Illness     Pt here for ER follow-up.  She was seen in the ED on 1/28/2022 with chest pain and shortness of breath.  She suffers from POTS that developed shortly after receiving her Covid-19 vaccine in August. She had a worsening of her baseline shortness of breath and chest pain and was concerned about myocarditis, which prompted her ER visit.  Troponin and CXR were negative.  Dr. Bailey was contacted, who ordered an echocardiogram.  Pt says this hasn't been scheduled yet.  She is feeling quite a bit better in terms of the chest pain and shortness of breath, but is incapacitated by the other POTS symptoms, including dizziness, fatigue, \"brain fog,\" poor appetite, poor activity tolerance, etc.  She says the pyridostigmine helped for several weeks but she is feeling worse again.    Patient Active Problem List   Diagnosis   • Irritable bowel syndrome   • Chronic neck pain   • Rosacea   • Insomnia   • Annual physical exam   • Hot flashes   • Screening for colon cancer   • History of colon polyps   • Irritable bowel syndrome with diarrhea   • Autonomic orthostatic hypotension   • Supine hypertension       Current Outpatient Medications on File Prior to Visit   Medication Sig   • acetaminophen (TYLENOL) 500 MG tablet Take 500 mg by mouth Every 6 (Six) Hours As Needed for Mild Pain .   • albuterol sulfate  (90 Base) MCG/ACT inhaler Inhale 2 puffs by mouth into the lungs every 4 hours as needed for wheezing or coughing.   • amitriptyline (ELAVIL) 50 MG tablet Take 1 tablet by mouth every night at bedtime.   • Ascorbic Acid (VITAMIN C PO) Take 1 tablet by mouth Daily.   • baclofen (LIORESAL) 20 MG tablet Take 1 tablet by mouth Every Night. (Patient taking differently: Take 20 mg by mouth As Needed.)   • Calcium 500-100 " MG-UNIT chewable tablet Chew 1 tablet Every 4 (Four) Hours As Needed.   • CBD oil (cannabidiol) capsule Take  by mouth.   • cetirizine (zyrTEC) 10 MG tablet Take 10 mg by mouth Daily.   • cloNIDine (Catapres) 0.1 MG tablet Take 1 tablet by mouth 2 (Two) Times a Day.   • Crisaborole (Eucrisa) 2 % ointment Apply 1 application topically to the appropriate area as directed 2 (two) times a day.   • desoximetasone (TOPICORT) 0.25 % cream Apply  topically to the appropriate area as directed 2 (Two) Times a Day.   • dicyclomine (BENTYL) 20 MG tablet Take 1 tablet by mouth Every 6 (Six) Hours. (Patient taking differently: Take 20 mg by mouth As Needed.)   • docusate sodium (COLACE) 250 MG capsule Take 250 mg by mouth Daily.   • famotidine (PEPCID) 40 MG tablet Take 1 tablet by mouth 2 (Two) Times a Day.   • fluticasone (FLONASE) 50 MCG/ACT nasal spray 2 sprays into each nostril daily. Administer 2 sprays in each nostril for each dose.   • levothyroxine (SYNTHROID, LEVOTHROID) 75 MCG tablet Take 1 tablet by mouth Daily.   • magnesium oxide (MAGOX) 400 (241.3 Mg) MG tablet tablet Take 400 mg by mouth Daily.   • methocarbamol (ROBAXIN) 750 MG tablet Take 1 tablet by mouth Daily.   • Milk Thistle 250 MG capsule Take  by mouth.   • Multiple Vitamins-Minerals (MULTIVITAMIN ADULT PO) Take 1 tablet by mouth Daily.   • Multiple Vitamins-Minerals (ZINC PO) Take 1 tablet by mouth Daily.   • multivitamin with minerals (Optivite P.M.T.) tablet tablet Take 3 tablets by mouth Daily.   • Nutritional Supplements (CHLORELLA-SPIRULINA COMPLEX PO) Take 6 tablets by mouth 2 (Two) Times a Day.   • olopatadine (PATANOL) 0.1 % ophthalmic solution Administer 1 drop to both eyes 2 (Two) Times a Day As Needed.   • Omega-3 300 MG capsule Take  by mouth.   • pantoprazole (PROTONIX) 40 MG EC tablet Take 1 tablet by mouth 2 (two) times a day.   • PARoxetine (Paxil) 10 MG tablet Take 2 tablets by mouth Every Morning.   • Probiotic Product (PROBIOTIC  "ADVANCED PO) Take 1 capsule by mouth Daily.   • Simethicone 250 MG capsule Take 4 capsules by mouth Daily.   • sodium chloride 1 g tablet Take 1 g by mouth 3 (Three) Times a Day.   • Sodium Hyaluronate, oral, (Hyaluronic Acid) 100 MG capsule Take 500 mg by mouth.   • traZODone (DESYREL) 50 MG tablet Take 1 tablet by mouth At Night As Needed for Sleep.   • vitamin D3 125 MCG (5000 UT) capsule capsule Take 5,000 Units by mouth Daily.   • [DISCONTINUED] pyridostigmine (MESTINON) 60 MG tablet Take 1 tablet by mouth 3 (Three) Times a Day.   • [DISCONTINUED] hydroxychloroquine (PLAQUENIL) 200 MG tablet Take 200 mg by mouth 2 (Two) Times a Day.   • [DISCONTINUED] hydroxychloroquine (PLAQUENIL) 200 MG tablet Take 1 tablet by mouth 2 (two) times a day.   • [DISCONTINUED] magnesium chloride ER (MAG-DELAY) 535 (64 Mg) MG CR tablet Take 1 tablet by mouth Daily.   • [DISCONTINUED] modafinil (PROVIGIL) 100 MG tablet Take 1 tablet by mouth Daily.     No current facility-administered medications on file prior to visit.          The following portions of the patient's history were reviewed and updated as appropriate: allergies, current medications, past family history, past medical history, past social history, past surgical history and problem list.    Review of Systems   Constitutional: Positive for fatigue.   Eyes: Positive for visual disturbance.   Respiratory: Positive for shortness of breath.    Cardiovascular: Positive for chest pain. Negative for palpitations and leg swelling.   Neurological: Positive for headache.   Psychiatric/Behavioral: Positive for stress.       Objective   Vitals:    02/17/22 1438   BP: 124/78   Pulse: 86   Resp: 20   Temp: 97.8 °F (36.6 °C)   TempSrc: Temporal   SpO2: 99%   Weight: 92.5 kg (204 lb)   Height: 182.9 cm (72.01\")       BP Readings from Last 3 Encounters:   02/17/22 124/78   01/28/22 139/94   11/11/21 132/80        Wt Readings from Last 3 Encounters:   02/17/22 92.5 kg (204 lb)   01/28/22 " 90.7 kg (200 lb)   11/11/21 88.5 kg (195 lb)        Body mass index is 27.66 kg/m².  Nursing notes and vitals reviewed.    Physical Exam  Vitals and nursing note reviewed.   Constitutional:       General: She is not in acute distress.     Appearance: She is ill-appearing. She is not toxic-appearing.      Comments: Lying on the exam table.   HENT:      Head: Normocephalic and atraumatic.      Mouth/Throat:      Mouth: Mucous membranes are moist.   Eyes:      Extraocular Movements: Extraocular movements intact.      Conjunctiva/sclera: Conjunctivae normal.   Cardiovascular:      Rate and Rhythm: Normal rate and regular rhythm.      Heart sounds: Normal heart sounds.   Pulmonary:      Effort: Pulmonary effort is normal.      Breath sounds: Normal breath sounds. No rales.   Abdominal:      Palpations: Abdomen is soft.      Tenderness: There is no abdominal tenderness.   Musculoskeletal:      Cervical back: Neck supple. No rigidity.      Right lower leg: No edema.      Left lower leg: No edema.   Skin:     General: Skin is warm and dry.   Neurological:      General: No focal deficit present.      Mental Status: She is oriented to person, place, and time.   Psychiatric:         Mood and Affect: Mood normal.         Behavior: Behavior normal.         Recent Results (from the past 672 hour(s))   ECG 12 Lead    Collection Time: 01/28/22  2:29 PM   Result Value Ref Range    QT Interval 409 ms   Comprehensive Metabolic Panel    Collection Time: 01/28/22  3:08 PM    Specimen: Blood   Result Value Ref Range    Glucose 105 (H) 65 - 99 mg/dL    BUN 22 (H) 6 - 20 mg/dL    Creatinine 0.77 0.57 - 1.00 mg/dL    Sodium 139 136 - 145 mmol/L    Potassium 4.0 3.5 - 5.2 mmol/L    Chloride 101 98 - 107 mmol/L    CO2 27.9 22.0 - 29.0 mmol/L    Calcium 9.2 8.6 - 10.5 mg/dL    Total Protein 6.8 6.0 - 8.5 g/dL    Albumin 4.00 3.50 - 5.20 g/dL    ALT (SGPT) 51 (H) 1 - 33 U/L    AST (SGOT) 36 (H) 1 - 32 U/L    Alkaline Phosphatase 133 (H) 39 -  117 U/L    Total Bilirubin 0.3 0.0 - 1.2 mg/dL    eGFR Non African Amer 80 >60 mL/min/1.73    Globulin 2.8 gm/dL    A/G Ratio 1.4 g/dL    BUN/Creatinine Ratio 28.6 (H) 7.0 - 25.0    Anion Gap 10.1 5.0 - 15.0 mmol/L   Troponin    Collection Time: 01/28/22  3:08 PM    Specimen: Blood   Result Value Ref Range    Troponin T <0.010 0.000 - 0.030 ng/mL   Green Top (Gel)    Collection Time: 01/28/22  3:08 PM   Result Value Ref Range    Extra Tube Hold for add-ons.    Lavender Top    Collection Time: 01/28/22  3:08 PM   Result Value Ref Range    Extra Tube hold for add-on    Gold Top - SST    Collection Time: 01/28/22  3:08 PM   Result Value Ref Range    Extra Tube Hold for add-ons.    Light Blue Top    Collection Time: 01/28/22  3:08 PM   Result Value Ref Range    Extra Tube hold for add-on    CBC Auto Differential    Collection Time: 01/28/22  3:08 PM    Specimen: Blood   Result Value Ref Range    WBC 6.28 3.40 - 10.80 10*3/mm3    RBC 4.37 3.77 - 5.28 10*6/mm3    Hemoglobin 13.6 12.0 - 15.9 g/dL    Hematocrit 41.3 34.0 - 46.6 %    MCV 94.5 79.0 - 97.0 fL    MCH 31.1 26.6 - 33.0 pg    MCHC 32.9 31.5 - 35.7 g/dL    RDW 12.8 12.3 - 15.4 %    RDW-SD 44.4 37.0 - 54.0 fl    MPV 9.8 6.0 - 12.0 fL    Platelets 205 140 - 450 10*3/mm3    Neutrophil % 49.3 42.7 - 76.0 %    Lymphocyte % 27.9 19.6 - 45.3 %    Monocyte % 7.6 5.0 - 12.0 %    Eosinophil % 13.9 (H) 0.3 - 6.2 %    Basophil % 1.0 0.0 - 1.5 %    Immature Grans % 0.3 0.0 - 0.5 %    Neutrophils, Absolute 3.10 1.70 - 7.00 10*3/mm3    Lymphocytes, Absolute 1.75 0.70 - 3.10 10*3/mm3    Monocytes, Absolute 0.48 0.10 - 0.90 10*3/mm3    Eosinophils, Absolute 0.87 (H) 0.00 - 0.40 10*3/mm3    Basophils, Absolute 0.06 0.00 - 0.20 10*3/mm3    Immature Grans, Absolute 0.02 0.00 - 0.05 10*3/mm3    nRBC 0.0 0.0 - 0.2 /100 WBC         Assessment/Plan   Diagnoses and all orders for this visit:    1. Autonomic orthostatic hypotension (Primary)  -     pyridostigmine (MESTINON) 60 MG tablet;  Take 2 tablets by mouth 3 (Three) Times a Day.  Dispense: 540 tablet; Refill: 1    2. Chest pain, unspecified type      Pt's POTS symptoms are debilitating.  Will have our office contact scheduling re the echocardiogram.  ER records reviewed.  Pt has appt with [facility], but her appointment is not until October.  She has recently reached out to a Dr. Pillai online and is going to start a virtual program which includes supplements.  Trial of increased pyridostigmine dose 120 mg TID.      Medications, including side effects, were discussed with the patient. Patient verbalized understanding.  The plan of care was discussed. All questions were answered. Patient verbalized understanding.      Return in about 3 months (around 5/17/2022).

## 2022-02-21 RX ORDER — CLONIDINE HYDROCHLORIDE 0.1 MG/1
0.1 TABLET ORAL 2 TIMES DAILY
Qty: 60 TABLET | Refills: 5 | Status: SHIPPED | OUTPATIENT
Start: 2022-02-21 | End: 2022-05-18 | Stop reason: SDUPTHER

## 2022-03-01 ENCOUNTER — HOSPITAL ENCOUNTER (OUTPATIENT)
Dept: CARDIOLOGY | Facility: HOSPITAL | Age: 52
Discharge: HOME | End: 2022-03-01
Admitting: INTERNAL MEDICINE

## 2022-03-01 ENCOUNTER — TELEPHONE (OUTPATIENT)
Dept: CARDIOLOGY | Facility: CLINIC | Age: 52
End: 2022-03-01

## 2022-03-01 VITALS
BODY MASS INDEX: 27.47 KG/M2 | HEIGHT: 72 IN | SYSTOLIC BLOOD PRESSURE: 153 MMHG | WEIGHT: 202.82 LBS | HEART RATE: 79 BPM | DIASTOLIC BLOOD PRESSURE: 97 MMHG

## 2022-03-01 DIAGNOSIS — G90.A POTS (POSTURAL ORTHOSTATIC TACHYCARDIA SYNDROME): ICD-10-CM

## 2022-03-01 DIAGNOSIS — R07.89 OTHER CHEST PAIN: ICD-10-CM

## 2022-03-01 DIAGNOSIS — R06.02 SOB (SHORTNESS OF BREATH): ICD-10-CM

## 2022-03-01 LAB
AORTIC DIMENSIONLESS INDEX: 0.79 (DI)
BH CV ECHO MEAS - AO MAX PG: 4.9 MMHG
BH CV ECHO MEAS - AO MEAN PG: 2.49 MMHG
BH CV ECHO MEAS - AO V2 MAX: 110.4 CM/SEC
BH CV ECHO MEAS - AO V2 VTI: 20 CM
BH CV ECHO MEAS - AVA(I,D): 2.7 CM2
BH CV ECHO MEAS - EDV(CUBED): 145.5 ML
BH CV ECHO MEAS - EDV(MOD-SP2): 62.9 ML
BH CV ECHO MEAS - EDV(MOD-SP4): 77.3 ML
BH CV ECHO MEAS - EF(MOD-BP): 59 %
BH CV ECHO MEAS - EF(MOD-SP2): 50.6 %
BH CV ECHO MEAS - EF(MOD-SP4): 66.1 %
BH CV ECHO MEAS - ESV(CUBED): 38.2 ML
BH CV ECHO MEAS - ESV(MOD-SP2): 31.1 ML
BH CV ECHO MEAS - ESV(MOD-SP4): 26.2 ML
BH CV ECHO MEAS - FS: 36 %
BH CV ECHO MEAS - IVS/LVPW: 1.05 CM
BH CV ECHO MEAS - IVSD: 0.99 CM
BH CV ECHO MEAS - LAT PEAK E' VEL: 10 CM/SEC
BH CV ECHO MEAS - LV DIASTOLIC VOL/BSA (35-75): 36.2 CM2
BH CV ECHO MEAS - LV MASS(C)D: 190.8 GRAMS
BH CV ECHO MEAS - LV MAX PG: 3.2 MMHG
BH CV ECHO MEAS - LV MEAN PG: 1.6 MMHG
BH CV ECHO MEAS - LV SYSTOLIC VOL/BSA (12-30): 12.3 CM2
BH CV ECHO MEAS - LV V1 MAX: 88.8 CM/SEC
BH CV ECHO MEAS - LV V1 VTI: 15.9 CM
BH CV ECHO MEAS - LVIDD: 5.3 CM
BH CV ECHO MEAS - LVIDS: 3.4 CM
BH CV ECHO MEAS - LVOT AREA: 3.4 CM2
BH CV ECHO MEAS - LVOT DIAM: 2.09 CM
BH CV ECHO MEAS - LVPWD: 0.95 CM
BH CV ECHO MEAS - MED PEAK E' VEL: 9.8 CM/SEC
BH CV ECHO MEAS - MV A DUR: 0.13 SEC
BH CV ECHO MEAS - MV A MAX VEL: 61.4 CM/SEC
BH CV ECHO MEAS - MV DEC SLOPE: 184.6 CM/SEC2
BH CV ECHO MEAS - MV DEC TIME: 0.2 MSEC
BH CV ECHO MEAS - MV E MAX VEL: 45.7 CM/SEC
BH CV ECHO MEAS - MV E/A: 0.74
BH CV ECHO MEAS - MV MEAN PG: 0.49 MMHG
BH CV ECHO MEAS - MV V2 VTI: 15.6 CM
BH CV ECHO MEAS - MVA(VTI): 3.5 CM2
BH CV ECHO MEAS - PA ACC TIME: 0.13 SEC
BH CV ECHO MEAS - PA PR(ACCEL): 19.6 MMHG
BH CV ECHO MEAS - PA V2 MAX: 70.6 CM/SEC
BH CV ECHO MEAS - RV V1 VTI: 11.6 CM
BH CV ECHO MEAS - RVOT DIAM: 2.9 CM
BH CV ECHO MEAS - SI(MOD-SP2): 14.9 ML/M2
BH CV ECHO MEAS - SI(MOD-SP4): 23.9 ML/M2
BH CV ECHO MEAS - SV(LVOT): 54.4 ML
BH CV ECHO MEAS - SV(MOD-SP2): 31.8 ML
BH CV ECHO MEAS - SV(MOD-SP4): 51.1 ML
BH CV ECHO MEAS - SV(RVOT): 78 ML
BH CV ECHO MEAS - TAPSE (>1.6): 2.9 CM
BH CV ECHO MEASUREMENTS AVERAGE E/E' RATIO: 4.62
BH CV XLRA - RV BASE: 2.9 CM
BH CV XLRA - RV LENGTH: 4.4 CM
BH CV XLRA - RV MID: 3 CM
LEFT ATRIUM VOLUME INDEX: 17.1 ML/M2
MAXIMAL PREDICTED HEART RATE: 170 BPM
STRESS TARGET HR: 145 BPM

## 2022-03-01 PROCEDURE — 93306 TTE W/DOPPLER COMPLETE: CPT | Performed by: INTERNAL MEDICINE

## 2022-03-01 PROCEDURE — 93306 TTE W/DOPPLER COMPLETE: CPT

## 2022-03-01 NOTE — TELEPHONE ENCOUNTER
Left voicemail for Nadja Shipley requesting callback.    Thank you,  Lissette Avilez RN  Triage Nurse [facility]

## 2022-03-01 NOTE — TELEPHONE ENCOUNTER
----- Message from Darrian Bailey III, MD sent at 3/1/2022  3:34 PM EST -----  Please call- normal results

## 2022-03-02 RX ORDER — ACETAZOLAMIDE 250 MG/1
250 TABLET ORAL 2 TIMES DAILY
Qty: 60 TABLET | Refills: 0 | Status: SHIPPED | OUTPATIENT
Start: 2022-03-02 | End: 2022-03-21 | Stop reason: SDUPTHER

## 2022-03-02 NOTE — TELEPHONE ENCOUNTER
Reviewed results with Nadja Shipley and patient verbalized understanding.    Patient is asking when and if she needs to schedule a follow-up with you?    Thank you,  Lissette Avilez RN  Triage Nurse [facility]

## 2022-03-03 NOTE — TELEPHONE ENCOUNTER
Patient notified that she will need follow up appointment in 1 year; verbalized understanding. Patient states she will call  to that time and schedule.

## 2022-03-03 NOTE — TELEPHONE ENCOUNTER
Left voicemail for Nadja Shipley requesting callback.    Patient needs 1 year follow up scheduled.    Thank you,  Lissette Avilez RN  Triage Nurse [facility]

## 2022-03-21 RX ORDER — ACETAZOLAMIDE 250 MG/1
250 TABLET ORAL 2 TIMES DAILY
Qty: 180 TABLET | Refills: 1 | Status: SHIPPED | OUTPATIENT
Start: 2022-03-21 | End: 2022-05-18 | Stop reason: SDUPTHER

## 2022-03-21 NOTE — TELEPHONE ENCOUNTER
Rx Refill Note  Requested Prescriptions     Pending Prescriptions Disp Refills   • acetaZOLAMIDE (DIAMOX) 250 MG tablet 180 tablet 1     Sig: Take 1 tablet by mouth 2 (Two) Times a Day.      Last office visit with prescribing clinician: 2/17/2022      Next office visit with prescribing clinician: 5/18/2022            Christy Tilley MA  03/21/22, 11:08 EDT

## 2022-03-23 RX ORDER — PAROXETINE 10 MG/1
20 TABLET, FILM COATED ORAL EVERY MORNING
Qty: 60 TABLET | Refills: 5 | Status: SHIPPED | OUTPATIENT
Start: 2022-03-23 | End: 2022-11-14

## 2022-03-26 DIAGNOSIS — M54.2 CHRONIC NECK PAIN: ICD-10-CM

## 2022-03-26 DIAGNOSIS — G89.29 CHRONIC NECK PAIN: ICD-10-CM

## 2022-03-28 RX ORDER — BACLOFEN 20 MG/1
20 TABLET ORAL NIGHTLY
Qty: 90 TABLET | Refills: 3 | Status: SHIPPED | OUTPATIENT
Start: 2022-03-28 | End: 2022-08-18

## 2022-03-28 NOTE — TELEPHONE ENCOUNTER
Rx Refill Note  Requested Prescriptions     Pending Prescriptions Disp Refills    baclofen (LIORESAL) 20 MG tablet 90 tablet 3     Sig: Take 1 tablet by mouth Every Night.      Last office visit with prescribing clinician: 2/17/2022      Next office visit with prescribing clinician: 5/18/2022            Christy Tilley MA  03/28/22, 13:43 EDT

## 2022-03-29 RX ORDER — CRISABOROLE 20 MG/G
1 OINTMENT TOPICAL
Qty: 60 G | Refills: 0 | Status: SHIPPED | OUTPATIENT
Start: 2022-03-29 | End: 2022-05-23 | Stop reason: SDUPTHER

## 2022-03-29 NOTE — TELEPHONE ENCOUNTER
Rx Refill Note  Requested Prescriptions     Pending Prescriptions Disp Refills   • Crisaborole (Eucrisa) 2 % ointment 60 g 0     Sig: Apply 1 application topically to the appropriate area as directed 2 (two) times a day.      Last office visit with prescribing clinician: 2/17/2022      Next office visit with prescribing clinician: 5/18/2022            Adeel Benito  03/29/22, 12:02 EDT

## 2022-04-22 ENCOUNTER — TELEPHONE (OUTPATIENT)
Dept: INTERNAL MEDICINE | Facility: CLINIC | Age: 52
End: 2022-04-22

## 2022-04-22 NOTE — TELEPHONE ENCOUNTER
Pt dropped off Long Term Disability Benefits packet and would like Dr Craig to fill out the marked page of the packet. Placed in Dr. BIANCHI's box.    Please fax to [employer] [phone] when completed. Info on paperwork as well.   Thank you.

## 2022-05-04 ENCOUNTER — TELEPHONE (OUTPATIENT)
Dept: INTERNAL MEDICINE | Facility: CLINIC | Age: 52
End: 2022-05-04

## 2022-05-09 ENCOUNTER — TELEPHONE (OUTPATIENT)
Dept: INTERNAL MEDICINE | Facility: CLINIC | Age: 52
End: 2022-05-09

## 2022-05-09 NOTE — TELEPHONE ENCOUNTER
Caller: Nadja Shipley    Relationship: Self    Best call back number: [phone]    Who are you requesting to speak with (clinical staff, provider,  specific staff member): DR GENTILE OR MA    What was the call regarding: PATIENT STATES SHE DROPPED OF LONG TERM DISABILITY PAPER WORK BE COMPLETED BY DR GENTILE, BUT HAS NOT HEARD BACK TO CONFIRM THIS WAS SENT OFF. REQUESTS CALL BACK TO CONFIRM COMPLETION OR IF SHE NEEDS TO RESUBMIT PAPERWORK    Do you require a callback: YES

## 2022-05-18 ENCOUNTER — OFFICE VISIT (OUTPATIENT)
Dept: INTERNAL MEDICINE | Facility: CLINIC | Age: 52
End: 2022-05-18

## 2022-05-18 VITALS
HEIGHT: 72 IN | TEMPERATURE: 97.8 F | OXYGEN SATURATION: 99 % | HEART RATE: 68 BPM | WEIGHT: 203 LBS | SYSTOLIC BLOOD PRESSURE: 140 MMHG | BODY MASS INDEX: 27.5 KG/M2 | DIASTOLIC BLOOD PRESSURE: 90 MMHG

## 2022-05-18 DIAGNOSIS — I95.1 AUTONOMIC ORTHOSTATIC HYPOTENSION: Chronic | ICD-10-CM

## 2022-05-18 PROCEDURE — 99213 OFFICE O/P EST LOW 20 MIN: CPT | Performed by: FAMILY MEDICINE

## 2022-05-18 RX ORDER — PYRIDOSTIGMINE BROMIDE 60 MG/1
120 TABLET ORAL 3 TIMES DAILY
Qty: 540 TABLET | Refills: 1 | Status: SHIPPED | OUTPATIENT
Start: 2022-05-18 | End: 2022-10-16 | Stop reason: SDUPTHER

## 2022-05-18 RX ORDER — ACETAZOLAMIDE 250 MG/1
250 TABLET ORAL 3 TIMES DAILY
Qty: 360 TABLET | Refills: 1 | Status: SHIPPED | OUTPATIENT
Start: 2022-05-18 | End: 2022-08-18

## 2022-05-18 RX ORDER — CLONIDINE HYDROCHLORIDE 0.1 MG/1
0.1 TABLET ORAL 2 TIMES DAILY
Qty: 60 TABLET | Refills: 5 | Status: SHIPPED | OUTPATIENT
Start: 2022-05-18 | End: 2022-08-18

## 2022-05-18 NOTE — PROGRESS NOTES
Subjective   Nadja Shipley is a 52 y.o. female presenting today for follow up of   Chief Complaint   Patient presents with   • Hypotension     POTS follow-up.       History of Present Illness     Pt here for 3 month f/u on POTS.  She is now virtually seeing Dr. Delicia Massey, a POTS optometrist, and has started some additional supplements that she feels are helping a little.  She is still quite debilitated and plans to start an intensive POTS treatment program through Dr. Massey.  She has a lab order to be done today.  Her appointment with [facility] is pending in October.    Patient Active Problem List   Diagnosis   • Irritable bowel syndrome   • Chronic neck pain   • Rosacea   • Insomnia   • Annual physical exam   • Hot flashes   • Screening for colon cancer   • History of colon polyps   • Irritable bowel syndrome with diarrhea   • Autonomic orthostatic hypotension   • Supine hypertension       Current Outpatient Medications on File Prior to Visit   Medication Sig   • acetaminophen (TYLENOL) 500 MG tablet Take 500 mg by mouth Every 6 (Six) Hours As Needed for Mild Pain .   • amitriptyline (ELAVIL) 50 MG tablet Take 1 tablet by mouth every night at bedtime.   • Ascorbic Acid (VITAMIN C PO) Take 1 tablet by mouth Daily.   • baclofen (LIORESAL) 20 MG tablet Take 1 tablet by mouth Every Night.   • Calcium 500-100 MG-UNIT chewable tablet Chew 1 tablet Every 4 (Four) Hours As Needed.   • CBD oil (cannabidiol) capsule Take  by mouth.   • cetirizine (zyrTEC) 10 MG tablet Take 10 mg by mouth Daily.   • Crisaborole (Eucrisa) 2 % ointment Apply 1 application topically to the appropriate area as directed 2 (two) times a day.   • dicyclomine (BENTYL) 20 MG tablet Take 1 tablet by mouth Every 6 (Six) Hours. (Patient taking differently: Take 20 mg by mouth As Needed.)   • docusate sodium (COLACE) 250 MG capsule Take 250 mg by mouth Daily.   • famotidine (PEPCID) 40 MG tablet Take 1 tablet by mouth 2 (Two)  Times a Day.   • fluticasone (FLONASE) 50 MCG/ACT nasal spray 2 sprays into each nostril daily. Administer 2 sprays in each nostril for each dose.   • levothyroxine (SYNTHROID, LEVOTHROID) 75 MCG tablet Take 1 tablet by mouth Daily.   • magnesium oxide (MAGOX) 400 (241.3 Mg) MG tablet tablet Take 400 mg by mouth Daily.   • methocarbamol (ROBAXIN) 750 MG tablet Take 1 tablet by mouth Daily.   • Milk Thistle 250 MG capsule Take  by mouth.   • Multiple Vitamins-Minerals (ZINC PO) Take 1 tablet by mouth Daily.   • Nutritional Supplements (CHLORELLA-SPIRULINA COMPLEX PO) Take 6 tablets by mouth 2 (Two) Times a Day.   • olopatadine (PATANOL) 0.1 % ophthalmic solution Administer 1 drop to both eyes 2 (Two) Times a Day As Needed.   • Omega-3 300 MG capsule Take  by mouth.   • pantoprazole (PROTONIX) 40 MG EC tablet Take 1 tablet by mouth 2 (two) times a day.   • PARoxetine (Paxil) 10 MG tablet Take 2 tablets by mouth Every Morning.   • Probiotic Product (PROBIOTIC ADVANCED PO) Take 1 capsule by mouth Daily.   • Simethicone 250 MG capsule Take 4 capsules by mouth Daily.   • Sodium Hyaluronate, oral, (Hyaluronic Acid) 100 MG capsule Take 500 mg by mouth.   • traZODone (DESYREL) 50 MG tablet Take 1 tablet by mouth At Night As Needed for Sleep.   • vitamin D3 125 MCG (5000 UT) capsule capsule Take 5,000 Units by mouth Daily.   • [DISCONTINUED] acetaZOLAMIDE (DIAMOX) 250 MG tablet Take 1 tablet by mouth 2 (Two) Times a Day. (Patient taking differently: Take 250 mg by mouth 3 (Three) Times a Day.)   • [DISCONTINUED] cloNIDine (Catapres) 0.1 MG tablet Take 1 tablet by mouth 2 (Two) Times a Day.   • [DISCONTINUED] pyridostigmine (MESTINON) 60 MG tablet Take 2 tablets by mouth 3 (Three) Times a Day.   • albuterol sulfate  (90 Base) MCG/ACT inhaler Inhale 2 puffs by mouth into the lungs every 4 hours as needed for wheezing or coughing.   • desoximetasone (TOPICORT) 0.25 % cream Apply  topically to the appropriate area as  "directed 2 (Two) Times a Day.   • Meloxicam 15 MG tablet dispersible    • Multiple Vitamins-Minerals (MULTIVITAMIN ADULT PO) Take 1 tablet by mouth Daily.   • multivitamin with minerals tablet tablet Take 3 tablets by mouth Daily.   • sodium chloride 1 g tablet Take 1 g by mouth 3 (Three) Times a Day.     No current facility-administered medications on file prior to visit.          The following portions of the patient's history were reviewed and updated as appropriate: allergies, current medications, past family history, past medical history, past social history, past surgical history and problem list.    Review of Systems   Constitutional: Positive for activity change, appetite change and fatigue.   Eyes: Positive for blurred vision.   Cardiovascular: Positive for palpitations.   Gastrointestinal: Positive for indigestion.   Neurological: Positive for weakness, light-headedness and memory problem.   Psychiatric/Behavioral: Positive for sleep disturbance.       Objective   Vitals:    05/18/22 1143   BP: 140/90   BP Location: Left arm   Patient Position: Lying   Cuff Size: Adult   Pulse: 68   Temp: 97.8 °F (36.6 °C)   SpO2: 99%   Weight: 92.1 kg (203 lb)   Height: 182 cm (71.65\")       BP Readings from Last 3 Encounters:   05/18/22 140/90   03/01/22 153/97   02/17/22 124/78        Wt Readings from Last 3 Encounters:   05/18/22 92.1 kg (203 lb)   03/01/22 92 kg (202 lb 13.2 oz)   02/17/22 92.5 kg (204 lb)        Body mass index is 27.8 kg/m².  Nursing notes and vitals reviewed.    Physical Exam  Vitals and nursing note reviewed.   Constitutional:       General: She is not in acute distress.     Appearance: She is ill-appearing (somewhat).      Comments: Lying on exam table.  Uses a wheelchair.   HENT:      Head: Normocephalic and atraumatic.      Mouth/Throat:      Mouth: Mucous membranes are moist.   Eyes:      Extraocular Movements: Extraocular movements intact.      Conjunctiva/sclera: Conjunctivae normal. "   Cardiovascular:      Rate and Rhythm: Normal rate and regular rhythm.      Heart sounds: Normal heart sounds.   Pulmonary:      Effort: Pulmonary effort is normal. No respiratory distress.      Breath sounds: Normal breath sounds.   Abdominal:      General: Bowel sounds are normal. There is no distension.      Palpations: Abdomen is soft.      Tenderness: There is no abdominal tenderness.   Musculoskeletal:      Cervical back: Neck supple. No rigidity.      Right lower leg: No edema.      Left lower leg: No edema.   Lymphadenopathy:      Cervical: No cervical adenopathy.   Skin:     General: Skin is warm and dry.   Neurological:      General: No focal deficit present.      Mental Status: She is alert and oriented to person, place, and time.   Psychiatric:         Mood and Affect: Mood normal.         Behavior: Behavior normal.         No results found for this or any previous visit (from the past 672 hour(s)).      Assessment & Plan   Diagnoses and all orders for this visit:    1. Autonomic orthostatic hypotension  -     pyridostigmine (MESTINON) 60 MG tablet; Take 2 tablets by mouth 3 (Three) Times a Day.  Dispense: 540 tablet; Refill: 1  -     [facility] Comprehensive Panel  -     CBC & Differential  -     D-dimer, Quantitative  -     Ferritin  -     IgG  -     IgM  -     IgA  -     Iron and TIBC  -     Trans. Growth Fact. beta 1  -     Thyroid Peroxidase Antibody  -     Vitamin B12  -     Vitamin D 25 Hydroxy    Other orders  -     acetaZOLAMIDE (DIAMOX) 250 MG tablet; Take 1 tablet by mouth 3 (Three) Times a Day.  Dispense: 360 tablet; Refill: 1  -     cloNIDine (Catapres) 0.1 MG tablet; Take 1 tablet by mouth 2 (Two) Times a Day.  Dispense: 60 tablet; Refill: 5        Continue current medication.  Labs today for Dr. Massey.  Dysautonomia clinic appointment still several months away.      Medications, including side effects, were discussed with the patient. Patient verbalized understanding.  The plan of care was  discussed. All questions were answered. Patient verbalized understanding.      Return in about 3 months (around 8/18/2022).

## 2022-05-20 ENCOUNTER — TELEPHONE (OUTPATIENT)
Dept: INTERNAL MEDICINE | Facility: CLINIC | Age: 52
End: 2022-05-20

## 2022-05-23 LAB
Lab: NORMAL
Lab: NORMAL

## 2022-05-25 PROBLEM — T50.B95A ADVERSE EFFECT OF COVID-19 VACCINE: Status: ACTIVE | Noted: 2022-05-25

## 2022-05-25 RX ORDER — CRISABOROLE 20 MG/G
1 OINTMENT TOPICAL
Qty: 60 G | Refills: 0 | Status: SHIPPED | OUTPATIENT
Start: 2022-05-25 | End: 2022-07-13 | Stop reason: SDUPTHER

## 2022-05-25 NOTE — TELEPHONE ENCOUNTER
Rx Refill Note  Requested Prescriptions     Pending Prescriptions Disp Refills   • Crisaborole (Eucrisa) 2 % ointment 60 g 0     Sig: Apply 1 application topically to the appropriate area as directed 2 (two) times a day.      Last office visit with prescribing clinician: 5/18/2022      Next office visit with prescribing clinician: 8/18/2022           Adeel Benito  05/25/22, 15:07 EDT

## 2022-05-26 LAB
SPECIMEN STATUS: NORMAL
WRITTEN AUTHORIZATION: NORMAL

## 2022-05-28 LAB
25(OH)D3+25(OH)D2 SERPL-MCNC: 59.8 NG/ML (ref 30–100)
BASOPHILS # BLD AUTO: 0.1 X10E3/UL (ref 0–0.2)
BASOPHILS NFR BLD AUTO: 1 %
CENTROMERE B AB SER-ACNC: <0.2 AI (ref 0–0.9)
CHROMATIN AB SERPL-ACNC: <0.2 AI (ref 0–0.9)
D DIMER PPP FEU-MCNC: 0.42 MG/L FEU (ref 0–0.49)
DSDNA AB SER-ACNC: 1 IU/ML (ref 0–9)
ENA JO1 AB SER-ACNC: <0.2 AI (ref 0–0.9)
ENA RNP AB SER-ACNC: <0.2 AI (ref 0–0.9)
ENA SCL70 AB SER-ACNC: <0.2 AI (ref 0–0.9)
ENA SM AB SER-ACNC: <0.2 AI (ref 0–0.9)
ENA SS-A AB SER-ACNC: 0.2 AI (ref 0–0.9)
ENA SS-B AB SER-ACNC: <0.2 AI (ref 0–0.9)
EOSINOPHIL # BLD AUTO: 0.1 X10E3/UL (ref 0–0.4)
EOSINOPHIL NFR BLD AUTO: 2 %
ERYTHROCYTE [DISTWIDTH] IN BLOOD BY AUTOMATED COUNT: 12.9 % (ref 11.7–15.4)
FERRITIN SERPL-MCNC: 189 NG/ML (ref 15–150)
HCT VFR BLD AUTO: 44.2 % (ref 34–46.6)
HGB BLD-MCNC: 14.7 G/DL (ref 11.1–15.9)
IGA SERPL-MCNC: 172 MG/DL (ref 87–352)
IGG SERPL-MCNC: 1268 MG/DL (ref 586–1602)
IGM SERPL-MCNC: 126 MG/DL (ref 26–217)
IMM GRANULOCYTES # BLD AUTO: 0 X10E3/UL (ref 0–0.1)
IMM GRANULOCYTES NFR BLD AUTO: 0 %
IRON SATN MFR SERPL: 36 % (ref 15–55)
IRON SERPL-MCNC: 97 UG/DL (ref 27–159)
LYMPHOCYTES # BLD AUTO: 1.9 X10E3/UL (ref 0.7–3.1)
LYMPHOCYTES NFR BLD AUTO: 33 %
Lab: NORMAL
MCH RBC QN AUTO: 30.8 PG (ref 26.6–33)
MCHC RBC AUTO-ENTMCNC: 33.3 G/DL (ref 31.5–35.7)
MCV RBC AUTO: 93 FL (ref 79–97)
MONOCYTES # BLD AUTO: 0.3 X10E3/UL (ref 0.1–0.9)
MONOCYTES NFR BLD AUTO: 6 %
NEUTROPHILS # BLD AUTO: 3.2 X10E3/UL (ref 1.4–7)
NEUTROPHILS NFR BLD AUTO: 58 %
PLATELET # BLD AUTO: 258 X10E3/UL (ref 150–450)
RBC # BLD AUTO: 4.78 X10E6/UL (ref 3.77–5.28)
TGF-BETA-1 SERPL-MCNC: 2852 PG/ML (ref 867–6662)
THYROPEROXIDASE AB SERPL-ACNC: 12 IU/ML (ref 0–34)
TIBC SERPL-MCNC: 268 UG/DL (ref 250–450)
UIBC SERPL-MCNC: 171 UG/DL (ref 131–425)
VIT B12 SERPL-MCNC: 1710 PG/ML (ref 232–1245)
WBC # BLD AUTO: 5.7 X10E3/UL (ref 3.4–10.8)

## 2022-05-29 LAB
IGE SERPL-ACNC: 27 IU/ML (ref 6–495)
WRITTEN AUTHORIZATION: NORMAL

## 2022-06-02 ENCOUNTER — LAB (OUTPATIENT)
Dept: LAB | Facility: HOSPITAL | Age: 52
End: 2022-06-02

## 2022-06-02 ENCOUNTER — TRANSCRIBE ORDERS (OUTPATIENT)
Dept: ADMINISTRATIVE | Facility: HOSPITAL | Age: 52
End: 2022-06-02

## 2022-06-02 ENCOUNTER — TRANSCRIBE ORDERS (OUTPATIENT)
Dept: MRI IMAGING | Facility: HOSPITAL | Age: 52
End: 2022-06-02

## 2022-06-02 DIAGNOSIS — G93.6 CEREBRAL EDEMA: Primary | ICD-10-CM

## 2022-06-02 DIAGNOSIS — G90.9 DISORDER OF AUTONOMIC NERVOUS SYSTEM: ICD-10-CM

## 2022-06-02 DIAGNOSIS — R53.83 TIREDNESS: ICD-10-CM

## 2022-06-02 DIAGNOSIS — R53.83 TIREDNESS: Primary | ICD-10-CM

## 2022-06-02 DIAGNOSIS — G93.2 BENIGN INTRACRANIAL HYPERTENSION: ICD-10-CM

## 2022-06-02 LAB
ANION GAP SERPL CALCULATED.3IONS-SCNC: 9.9 MMOL/L (ref 5–15)
CHLORIDE SERPL-SCNC: 108 MMOL/L (ref 98–107)
CO2 SERPL-SCNC: 22.1 MMOL/L (ref 22–29)
POTASSIUM SERPL-SCNC: 3.9 MMOL/L (ref 3.5–5.2)
SODIUM SERPL-SCNC: 140 MMOL/L (ref 136–145)

## 2022-06-02 PROCEDURE — 36415 COLL VENOUS BLD VENIPUNCTURE: CPT

## 2022-06-02 PROCEDURE — 80051 ELECTROLYTE PANEL: CPT

## 2022-06-20 ENCOUNTER — LAB (OUTPATIENT)
Dept: LAB | Facility: HOSPITAL | Age: 52
End: 2022-06-20

## 2022-06-20 ENCOUNTER — TRANSCRIBE ORDERS (OUTPATIENT)
Dept: ADMINISTRATIVE | Facility: HOSPITAL | Age: 52
End: 2022-06-20

## 2022-06-20 ENCOUNTER — HOSPITAL ENCOUNTER (OUTPATIENT)
Dept: MRI IMAGING | Facility: HOSPITAL | Age: 52
Discharge: HOME | End: 2022-06-20

## 2022-06-20 DIAGNOSIS — G90.9 DISORDER OF AUTONOMIC NERVOUS SYSTEM: ICD-10-CM

## 2022-06-20 DIAGNOSIS — G93.2 BENIGN INTRACRANIAL HYPERTENSION: ICD-10-CM

## 2022-06-20 DIAGNOSIS — R53.83 TIREDNESS: Primary | ICD-10-CM

## 2022-06-20 DIAGNOSIS — R53.83 TIREDNESS: ICD-10-CM

## 2022-06-20 DIAGNOSIS — G93.6 CEREBRAL EDEMA: ICD-10-CM

## 2022-06-20 LAB — CREAT BLDA-MCNC: 0.7 MG/DL (ref 0.6–1.3)

## 2022-06-20 PROCEDURE — A9577 INJ MULTIHANCE: HCPCS | Performed by: OPTOMETRIST

## 2022-06-20 PROCEDURE — 0 GADOBENATE DIMEGLUMINE 529 MG/ML SOLUTION: Performed by: OPTOMETRIST

## 2022-06-20 PROCEDURE — 70553 MRI BRAIN STEM W/O & W/DYE: CPT

## 2022-06-20 PROCEDURE — 82565 ASSAY OF CREATININE: CPT

## 2022-06-20 RX ADMIN — GADOBENATE DIMEGLUMINE 18 ML: 529 INJECTION, SOLUTION INTRAVENOUS at 11:29

## 2022-06-22 ENCOUNTER — TRANSCRIBE ORDERS (OUTPATIENT)
Dept: ADMINISTRATIVE | Facility: HOSPITAL | Age: 52
End: 2022-06-22

## 2022-06-22 ENCOUNTER — LAB (OUTPATIENT)
Dept: LAB | Facility: HOSPITAL | Age: 52
End: 2022-06-22

## 2022-06-22 DIAGNOSIS — G90.9 DISORDER OF AUTONOMIC NERVOUS SYSTEM: Primary | ICD-10-CM

## 2022-06-22 DIAGNOSIS — G90.9 DISORDER OF AUTONOMIC NERVOUS SYSTEM: ICD-10-CM

## 2022-06-22 LAB
ALBUMIN SERPL-MCNC: 4.2 G/DL (ref 3.5–5.2)
ALBUMIN/GLOB SERPL: 1.6 G/DL
ALP SERPL-CCNC: 116 U/L (ref 39–117)
ALT SERPL W P-5'-P-CCNC: 71 U/L (ref 1–33)
ANION GAP SERPL CALCULATED.3IONS-SCNC: 11 MMOL/L (ref 5–15)
AST SERPL-CCNC: 47 U/L (ref 1–32)
BILIRUB SERPL-MCNC: 0.3 MG/DL (ref 0–1.2)
BUN SERPL-MCNC: 15 MG/DL (ref 6–20)
BUN/CREAT SERPL: 21.1 (ref 7–25)
CALCIUM SPEC-SCNC: 9.3 MG/DL (ref 8.6–10.5)
CHLORIDE SERPL-SCNC: 106 MMOL/L (ref 98–107)
CO2 SERPL-SCNC: 24 MMOL/L (ref 22–29)
CREAT SERPL-MCNC: 0.71 MG/DL (ref 0.57–1)
EGFRCR SERPLBLD CKD-EPI 2021: 103.7 ML/MIN/1.73
FERRITIN SERPL-MCNC: 135 NG/ML (ref 13–150)
GLOBULIN UR ELPH-MCNC: 2.7 GM/DL
GLUCOSE SERPL-MCNC: 91 MG/DL (ref 65–99)
POTASSIUM SERPL-SCNC: 3.7 MMOL/L (ref 3.5–5.2)
PROT SERPL-MCNC: 6.9 G/DL (ref 6–8.5)
SODIUM SERPL-SCNC: 141 MMOL/L (ref 136–145)

## 2022-06-22 PROCEDURE — 80053 COMPREHEN METABOLIC PANEL: CPT

## 2022-06-22 PROCEDURE — 82728 ASSAY OF FERRITIN: CPT

## 2022-06-22 PROCEDURE — 36415 COLL VENOUS BLD VENIPUNCTURE: CPT

## 2022-07-13 RX ORDER — CRISABOROLE 20 MG/G
1 OINTMENT TOPICAL
Qty: 60 G | Refills: 0 | Status: SHIPPED | OUTPATIENT
Start: 2022-07-13 | End: 2022-09-29 | Stop reason: SDUPTHER

## 2022-07-13 NOTE — TELEPHONE ENCOUNTER
Rx Refill Note  Requested Prescriptions     Pending Prescriptions Disp Refills    Crisaborole (Eucrisa) 2 % ointment 60 g 0     Sig: Apply 1 application topically to the appropriate area as directed 2 (two) times a day.      Last office visit with prescribing clinician: Visit date not found      Next office visit with prescribing clinician: Visit date not found            DAVID GREENE MA  07/13/22, 08:00 EDT

## 2022-07-25 RX ORDER — PENTOXIFYLLINE 400 MG/1
400 TABLET, EXTENDED RELEASE ORAL 3 TIMES DAILY
Qty: 270 TABLET | Refills: 3 | Status: CANCELLED | OUTPATIENT
Start: 2022-07-25

## 2022-07-26 RX ORDER — PAROXETINE 10 MG/1
20 TABLET, FILM COATED ORAL EVERY MORNING
Qty: 60 TABLET | Refills: 5 | OUTPATIENT
Start: 2022-07-26

## 2022-07-26 NOTE — TELEPHONE ENCOUNTER
Rx Refill Note  Requested Prescriptions     Pending Prescriptions Disp Refills   • pentoxifylline (TRENtal) 400 MG CR tablet 270 tablet 3     Sig: Take 1 tablet by mouth 3 (Three) Times a Day.      Last office visit with prescribing clinician: 5/18/2022      Next office visit with prescribing clinician: 8/18/2022            Sharon Gomez MA  07/26/22, 15:22 EDT

## 2022-07-27 ENCOUNTER — LAB (OUTPATIENT)
Dept: LAB | Facility: HOSPITAL | Age: 52
End: 2022-07-27

## 2022-07-27 ENCOUNTER — TRANSCRIBE ORDERS (OUTPATIENT)
Dept: ADMINISTRATIVE | Facility: HOSPITAL | Age: 52
End: 2022-07-27

## 2022-07-27 DIAGNOSIS — G90.9 DISORDER OF AUTONOMIC NERVOUS SYSTEM: ICD-10-CM

## 2022-07-27 DIAGNOSIS — R53.83 TIREDNESS: Primary | ICD-10-CM

## 2022-07-27 DIAGNOSIS — R53.83 TIREDNESS: ICD-10-CM

## 2022-07-27 LAB
ANION GAP SERPL CALCULATED.3IONS-SCNC: 11.2 MMOL/L (ref 5–15)
CHLORIDE SERPL-SCNC: 103 MMOL/L (ref 98–107)
CO2 SERPL-SCNC: 25.8 MMOL/L (ref 22–29)
POTASSIUM SERPL-SCNC: 3 MMOL/L (ref 3.5–5.2)
SODIUM SERPL-SCNC: 140 MMOL/L (ref 136–145)

## 2022-07-27 PROCEDURE — 36415 COLL VENOUS BLD VENIPUNCTURE: CPT

## 2022-07-27 PROCEDURE — 80051 ELECTROLYTE PANEL: CPT

## 2022-07-28 ENCOUNTER — TELEPHONE (OUTPATIENT)
Dept: INTERNAL MEDICINE | Facility: CLINIC | Age: 52
End: 2022-07-28

## 2022-07-28 NOTE — TELEPHONE ENCOUNTER
GAIL WITH [employer] CALLED NEEDING STATUS OF 11 PAGES SENT ON 7/19/22    LOOKING FOR RESPONSE AND FUNCTIONAL CAPACITY FORM     CALL BACK NUMBER [phone]    FAX NUMBER [phone]

## 2022-08-03 ENCOUNTER — TELEPHONE (OUTPATIENT)
Dept: INTERNAL MEDICINE | Facility: CLINIC | Age: 52
End: 2022-08-03

## 2022-08-03 NOTE — TELEPHONE ENCOUNTER
Caller: GAIL    Relationship: [employer]    Best call back number: [phone]    What form or medical record are you requesting: NARRATIVE REPORT AND FUNCTIONAL COMPACITY ASSESSMENT FORMS    Who is requesting this form or medical record from you: [employer]    How would you like to receive the form or medical records (pick-up, mail, fax): FAX  If fax, what is the fax number:[phone]    Timeframe paperwork needed: ASAP    Additional notes: GAIL STATES THAT SHE ORIGINALLY FAXED OVER AND REQUESTED THE NARRATIVE REPORT AND FUNCTIONAL COMPACITY ASSESSMENT FORMS ON 7/19/22 AND WAS TOLD ON 7/20/22 THAT THEY WERE RECEIVED AND BEING WORKED ON(THERE WERE 6 QUESTIONS FOR DR GENTILE TO COMPLETE) .SHE STATES THAT WHEN SHE DID RECEIVE THEM BACK THAT IT WAS NOT THE CORRECT DOCUMENTS.SHE IS CALLING FOR AN UPDATE ON THEM AND TO KNOW IF THEY ARE COMPLETED.SHE STATES THAT SHE NEEDS TO KNOW IF THE DOCTOR IS GOING TO COMPLETE THEM BECAUSE IF NOT SHE WILL FOLLOW UP UNTIL SHE GETS AN ANSWER.GAIL IS REQUESTING A CALLBACK FOR ANY INFORMATION OR AN UPDATE.

## 2022-08-05 NOTE — TELEPHONE ENCOUNTER
Requested Prescriptions             Pending Prescriptions Disp Refills   • pentoxifylline (TRENtal) 400 MG CR tablet 270 tablet 3       Sig: Take 1 tablet by mouth 3 (Three) Times a Day.      Last office visit with prescribing clinician: 5/18/2022      Next office visit with prescribing clinician: 8/18/2022     PATIENT IS OUT OF THE MEDICATION.  CAN THIS BE FILLED TODAY BEFORE 5PM?    PHARMACY: [facility]  P: [phone]  F: [phone]    Address    [address] [place] [id number]

## 2022-08-05 NOTE — TELEPHONE ENCOUNTER
Original prescriber is a [place] she no longer has our insurance and has medicaid and is needing the medication prescribed by you. Can you send in?

## 2022-08-08 RX ORDER — PENTOXIFYLLINE 400 MG/1
400 TABLET, EXTENDED RELEASE ORAL 3 TIMES DAILY
Qty: 270 TABLET | Refills: 3 | OUTPATIENT
Start: 2022-08-08

## 2022-08-09 NOTE — TELEPHONE ENCOUNTER
I called and spoke with patient and informed her of what Dr. Craig had said she stated her understanding

## 2022-08-18 ENCOUNTER — OFFICE VISIT (OUTPATIENT)
Dept: INTERNAL MEDICINE | Facility: CLINIC | Age: 52
End: 2022-08-18

## 2022-08-18 VITALS
HEIGHT: 72 IN | DIASTOLIC BLOOD PRESSURE: 84 MMHG | TEMPERATURE: 97.3 F | WEIGHT: 211.6 LBS | OXYGEN SATURATION: 95 % | HEART RATE: 84 BPM | SYSTOLIC BLOOD PRESSURE: 132 MMHG | BODY MASS INDEX: 28.66 KG/M2

## 2022-08-18 DIAGNOSIS — I95.1 AUTONOMIC ORTHOSTATIC HYPOTENSION: Primary | Chronic | ICD-10-CM

## 2022-08-18 DIAGNOSIS — T50.B95A ADVERSE EFFECT OF COVID-19 VACCINE: ICD-10-CM

## 2022-08-18 PROBLEM — I10 ORTHOSTATIC HYPERTENSION: Status: ACTIVE | Noted: 2022-08-18

## 2022-08-18 PROBLEM — G93.2 IDIOPATHIC INTRACRANIAL HYPERTENSION: Status: ACTIVE | Noted: 2022-08-18

## 2022-08-18 PROBLEM — E88.810 METABOLIC SYNDROME: Status: ACTIVE | Noted: 2022-08-18

## 2022-08-18 PROBLEM — E88.81 METABOLIC SYNDROME: Status: ACTIVE | Noted: 2022-08-18

## 2022-08-18 PROBLEM — I51.9 LEFT VENTRICULAR DIASTOLIC DYSFUNCTION: Status: ACTIVE | Noted: 2022-08-18

## 2022-08-18 PROCEDURE — 99214 OFFICE O/P EST MOD 30 MIN: CPT | Performed by: FAMILY MEDICINE

## 2022-08-18 RX ORDER — KETOTIFEN FUMARATE 100 %
POWDER (GRAM) MISCELLANEOUS
COMMUNITY
Start: 2022-05-25

## 2022-08-18 RX ORDER — PENTOXIFYLLINE 400 MG/1
400 TABLET, EXTENDED RELEASE ORAL 3 TIMES DAILY
Qty: 270 TABLET | Refills: 1 | Status: SHIPPED | OUTPATIENT
Start: 2022-08-18 | End: 2023-02-05 | Stop reason: SDUPTHER

## 2022-08-18 NOTE — PROGRESS NOTES
"Subjective   Nadja Shipley is a 52 y.o. female presenting today for follow up of   Chief Complaint   Patient presents with   • Hypotention     3 monh Follow Up       History of Present Illness     Pt here for 3 month f/u autonomic orthostatic hypotension, which flared one year ago after Covid-19 vaccine.  She has been seeing Dr. Delicia Massey with [facility] virtually.  Dr. Massey is based in [place].  Pt reports that she had been gradually improving until her daughter's wedding a few weeks ago, which exhausted her and she feels like she is back to \"day one.\"  She feels confident that she will improve again with more time.  She asks me to take over the pentoxifylline prescribed by Dr. Massey as her insurance won't cover prescriptions from a [place] provider.  She doesn't know if she is going to keep the [facility] appointment in October as her insurance may not cover it.    Patient Active Problem List   Diagnosis   • Irritable bowel syndrome   • Chronic neck pain   • Rosacea   • Insomnia   • Annual physical exam   • Hot flashes   • Screening for colon cancer   • History of colon polyps   • Irritable bowel syndrome with diarrhea   • Autonomic orthostatic hypotension   • Supine hypertension   • Adverse effect of COVID-19 vaccine   • Orthostatic hypertension   • Left ventricular diastolic dysfunction   • Idiopathic intracranial hypertension   • Metabolic syndrome       Current Outpatient Medications on File Prior to Visit   Medication Sig   • acetaZOLAMIDE (DIAMOX) 250 MG tablet Take 1 tablet by mouth 3 (Three) Times a Day.   • amitriptyline (ELAVIL) 50 MG tablet Take 1 tablet by mouth every night at bedtime.   • Ascorbic Acid (VITAMIN C PO) Take 1 tablet by mouth Daily.   • Budesonide (Entocort EC) 3 MG 24 hr capsule Take 3 capsules by mouth Daily for 30 days, THEN 2 capsules Daily for 30 days, THEN 1 capsule Daily for 30 days.   • CBD oil (cannabidiol) capsule Take  by mouth.   • cetirizine " (zyrTEC) 10 MG tablet Take 10 mg by mouth Daily.   • Crisaborole (Eucrisa) 2 % ointment Apply 1 application topically to the appropriate area as directed 2 (two) times a day.   • desoximetasone (TOPICORT) 0.25 % cream Apply  topically to the appropriate area as directed 2 (Two) Times a Day.   • dicyclomine (BENTYL) 20 MG tablet Take 1 tablet by mouth Every 6 (Six) Hours. (Patient taking differently: Take 20 mg by mouth As Needed.)   • docusate sodium (COLACE) 250 MG capsule Take 250 mg by mouth Daily.   • famotidine (PEPCID) 40 MG tablet Take 1 tablet by mouth 2 (Two) Times a Day.   • fluticasone (FLONASE) 50 MCG/ACT nasal spray 2 sprays into each nostril daily. Administer 2 sprays in each nostril for each dose.   • Ketotifen Fumarate powder    • levothyroxine (SYNTHROID, LEVOTHROID) 75 MCG tablet Take 1 tablet by mouth Daily.   • losartan (COZAAR) 100 MG tablet Take 1 tablet by mouth at Bedtime.   • magnesium oxide (MAGOX) 400 (241.3 Mg) MG tablet tablet Take 400 mg by mouth Daily.   • methocarbamol (ROBAXIN) 750 MG tablet Take 1 tablet by mouth Daily.   • Multiple Vitamins-Minerals (ZINC PO) Take 1 tablet by mouth Daily.   • olopatadine (PATANOL) 0.1 % ophthalmic solution Administer 1 drop to both eyes 2 (Two) Times a Day As Needed.   • Omega-3 300 MG capsule Take  by mouth.   • pantoprazole (PROTONIX) 40 MG EC tablet Take 1 tablet by mouth 2 (two) times a day.   • PARoxetine (Paxil) 10 MG tablet Take 2 tablets by mouth Every Morning.   • Probiotic Product (PROBIOTIC ADVANCED PO) Take 1 capsule by mouth Daily.   • pyridostigmine (MESTINON) 60 MG tablet Take 2 tablets by mouth 3 (Three) Times a Day.   • Simethicone 250 MG capsule Take 4 capsules by mouth Daily.   • SUPER B COMPLEX/C PO    • traZODone (DESYREL) 50 MG tablet Take 1 tablet by mouth At Night As Needed for Sleep.   • vitamin D3 125 MCG (5000 UT) capsule capsule Take 5,000 Units by mouth Daily.   • [DISCONTINUED] pentoxifylline (TRENtal) 400 MG CR tablet  Take 1 tablet by mouth 3 (Three) Times a Day.   • [DISCONTINUED] acetaminophen (TYLENOL) 500 MG tablet Take 500 mg by mouth Every 6 (Six) Hours As Needed for Mild Pain .   • [DISCONTINUED] acetaZOLAMIDE (DIAMOX) 250 MG tablet Take 1 tablet by mouth 3 (Three) Times a Day.   • [DISCONTINUED] albuterol sulfate  (90 Base) MCG/ACT inhaler Inhale 2 puffs by mouth into the lungs every 4 hours as needed for wheezing or coughing.   • [DISCONTINUED] baclofen (LIORESAL) 20 MG tablet Take 1 tablet by mouth Every Night.   • [DISCONTINUED] Calcium 500-100 MG-UNIT chewable tablet Chew 1 tablet Every 4 (Four) Hours As Needed.   • [DISCONTINUED] cloNIDine (Catapres) 0.1 MG tablet Take 1 tablet by mouth 2 (Two) Times a Day.   • [DISCONTINUED] Milk Thistle 250 MG capsule Take  by mouth.   • [DISCONTINUED] Multiple Vitamins-Minerals (MULTIVITAMIN ADULT PO) Take 1 tablet by mouth Daily.   • [DISCONTINUED] multivitamin with minerals tablet tablet Take 3 tablets by mouth Daily.   • [DISCONTINUED] Nutritional Supplements (CHLORELLA-SPIRULINA COMPLEX PO) Take 6 tablets by mouth 2 (Two) Times a Day.   • [DISCONTINUED] sodium chloride 1 g tablet Take 1 g by mouth 3 (Three) Times a Day.   • [DISCONTINUED] Sodium Hyaluronate, oral, (Hyaluronic Acid) 100 MG capsule Take 500 mg by mouth.     No current facility-administered medications on file prior to visit.          The following portions of the patient's history were reviewed and updated as appropriate: allergies, current medications, past family history, past medical history, past social history, past surgical history and problem list.    Review of Systems   Constitutional: Positive for activity change, appetite change and fatigue.   Eyes: Positive for blurred vision.   Cardiovascular: Positive for palpitations.   Gastrointestinal: Positive for indigestion.   Neurological: Positive for weakness, light-headedness and memory problem.   Psychiatric/Behavioral: Positive for sleep  "disturbance.       Objective   Vitals:    08/18/22 1145   BP: 132/84   Pulse: 84   Temp: 97.3 °F (36.3 °C)   SpO2: 95%   Weight: 96 kg (211 lb 9.6 oz)   Height: 182 cm (71.65\")       BP Readings from Last 3 Encounters:   08/18/22 132/84   05/18/22 140/90   03/01/22 153/97        Wt Readings from Last 3 Encounters:   08/18/22 96 kg (211 lb 9.6 oz)   06/20/22 92.5 kg (204 lb)   05/18/22 92.1 kg (203 lb)        Body mass index is 28.98 kg/m².  Nursing notes and vitals reviewed.    Physical Exam  Vitals and nursing note reviewed.   Constitutional:       General: She is not in acute distress.     Appearance: She is ill-appearing (somewhat).      Comments: Lying on exam table.  Uses a wheelchair.   HENT:      Head: Normocephalic and atraumatic.      Mouth/Throat:      Mouth: Mucous membranes are moist.   Eyes:      Extraocular Movements: Extraocular movements intact.      Conjunctiva/sclera: Conjunctivae normal.   Cardiovascular:      Rate and Rhythm: Normal rate and regular rhythm.      Heart sounds: Normal heart sounds.   Pulmonary:      Effort: Pulmonary effort is normal. No respiratory distress.      Breath sounds: Normal breath sounds.   Abdominal:      General: Bowel sounds are normal. There is no distension.      Palpations: Abdomen is soft.      Tenderness: There is no abdominal tenderness.   Musculoskeletal:      Cervical back: Neck supple. No rigidity.      Right lower leg: No edema.      Left lower leg: No edema.   Lymphadenopathy:      Cervical: No cervical adenopathy.   Skin:     General: Skin is warm and dry.   Neurological:      General: No focal deficit present.      Mental Status: She is alert and oriented to person, place, and time.   Psychiatric:         Mood and Affect: Mood normal.         Behavior: Behavior normal.         Recent Results (from the past 672 hour(s))   Electrolyte Panel    Collection Time: 07/27/22  1:49 PM    Specimen: Blood   Result Value Ref Range    Sodium 140 136 - 145 mmol/L    " Potassium 3.0 (L) 3.5 - 5.2 mmol/L    Chloride 103 98 - 107 mmol/L    CO2 25.8 22.0 - 29.0 mmol/L    Anion Gap 11.2 5.0 - 15.0 mmol/L         Assessment & Plan   Diagnoses and all orders for this visit:    1. Autonomic orthostatic hypotension (Primary)    2. Adverse effect of COVID-19 vaccine    Other orders  -     pentoxifylline (TRENtal) 400 MG CR tablet; Take 1 tablet by mouth 3 (Three) Times a Day.  Dispense: 270 tablet; Refill: 1        Pt flare in symptoms past few weeks.  Extensive discussion with patient regarding treatment plan, expected course.  Continue same for now.  Dr. Massey's records reviewed.  Continue lifestyle measures.      Medications, including side effects, were discussed with the patient. Patient verbalized understanding.  The plan of care was discussed. All questions were answered. Patient verbalized understanding.      Return in about 3 months (around 11/18/2022).              Answers for HPI/ROS submitted by the patient on 8/16/2022  Please describe your symptoms.: Brain fog, fatigue, dizziness, blurred vision, ha, stomach pain, Orthostatic Hypotension, hyperadrenergia  Have you had these symptoms before?: Yes  How long have you been having these symptoms?: Greater than 2 weeks  Please list any medications you are currently taking for this condition.: See medication list : diamox, Pentoxyfillen, ketotifen, budesonide, losartan, nac, parasym Plus, na hco3  Please describe any probable cause for these symptoms. : POTS disability brought on by covid vaccine on 8/27/21  What is the primary reason for your visit?: Other

## 2022-08-23 DIAGNOSIS — K58.0 IRRITABLE BOWEL SYNDROME WITH DIARRHEA: ICD-10-CM

## 2022-08-23 RX ORDER — DICYCLOMINE HCL 20 MG
20 TABLET ORAL EVERY 6 HOURS
Qty: 120 TABLET | Refills: 5 | Status: SHIPPED | OUTPATIENT
Start: 2022-08-23

## 2022-08-24 RX ORDER — AMITRIPTYLINE HYDROCHLORIDE 25 MG/1
50 TABLET, FILM COATED ORAL
Qty: 180 TABLET | Refills: 1 | Status: SHIPPED | OUTPATIENT
Start: 2022-08-24 | End: 2023-02-05 | Stop reason: SDUPTHER

## 2022-08-24 RX ORDER — OLOPATADINE HYDROCHLORIDE 1 MG/ML
1 SOLUTION/ DROPS OPHTHALMIC 2 TIMES DAILY PRN
Qty: 5 ML | Refills: 11 | OUTPATIENT
Start: 2022-08-24

## 2022-08-24 RX ORDER — LOSARTAN POTASSIUM 100 MG/1
100 TABLET ORAL NIGHTLY
Qty: 90 TABLET | Refills: 3 | OUTPATIENT
Start: 2022-08-24

## 2022-08-24 RX ORDER — TRAZODONE HYDROCHLORIDE 50 MG/1
50 TABLET ORAL NIGHTLY PRN
Qty: 90 TABLET | Refills: 1 | Status: SHIPPED | OUTPATIENT
Start: 2022-08-24 | End: 2023-01-09 | Stop reason: SDUPTHER

## 2022-08-24 RX ORDER — PANTOPRAZOLE SODIUM 40 MG/1
40 TABLET, DELAYED RELEASE ORAL DAILY
Qty: 180 TABLET | Refills: 3 | Status: SHIPPED | OUTPATIENT
Start: 2022-08-24 | End: 2022-12-01

## 2022-08-24 RX ORDER — FAMOTIDINE 40 MG/1
40 TABLET, FILM COATED ORAL 2 TIMES DAILY
Qty: 180 TABLET | Refills: 3 | Status: SHIPPED | OUTPATIENT
Start: 2022-08-24

## 2022-08-24 NOTE — TELEPHONE ENCOUNTER
Rx Refill Note  Requested Prescriptions     Pending Prescriptions Disp Refills   • amitriptyline (ELAVIL) 50 MG tablet 90 tablet 1     Sig: Take 1 tablet by mouth every night at bedtime.   • pantoprazole (PROTONIX) 40 MG EC tablet 180 tablet 3     Sig: Take 1 tablet by mouth.     Signed Prescriptions Disp Refills   • famotidine (PEPCID) 40 MG tablet 180 tablet 3     Sig: Take 1 tablet by mouth 2 (Two) Times a Day.     Authorizing Provider: JONATHAN GENTILE     Ordering User: NADYA GOMEZ   • traZODone (DESYREL) 50 MG tablet 90 tablet 1     Sig: Take 1 tablet by mouth At Night As Needed for Sleep.     Authorizing Provider: JONATHAN GENTILE     Ordering User: NADYA GOMEZ      Last office visit with prescribing clinician: 8/18/2022      Next office visit with prescribing clinician: 11/14/2022            Nadya Gomez MA  08/24/22, 15:25 EDT

## 2022-09-21 RX ORDER — LOSARTAN POTASSIUM 100 MG/1
100 TABLET ORAL NIGHTLY
Qty: 90 TABLET | Refills: 3 | Status: SHIPPED | OUTPATIENT
Start: 2022-09-21

## 2022-09-26 ENCOUNTER — TELEPHONE (OUTPATIENT)
Dept: GASTROENTEROLOGY | Facility: CLINIC | Age: 52
End: 2022-09-26

## 2022-09-26 NOTE — TELEPHONE ENCOUNTER
Received message from patient:    I am having difficultly swallowing pills, bread and sometimes liquid.  I am still taking my pantoprazol 40mg bid and famotidine 40mg bid daily.  I am taking small bites and using water to wash them down and am having to sit upright to get things to go down which is difficult for me with my currents POTS orthostatic intolerance.      Please advise.

## 2022-09-29 RX ORDER — CRISABOROLE 20 MG/G
1 OINTMENT TOPICAL
Qty: 60 G | Refills: 2 | Status: SHIPPED | OUTPATIENT
Start: 2022-09-29 | End: 2023-01-26 | Stop reason: SDUPTHER

## 2022-09-29 NOTE — TELEPHONE ENCOUNTER
Rx Refill Note  Requested Prescriptions     Pending Prescriptions Disp Refills   • Crisaborole (Eucrisa) 2 % ointment 60 g 0     Sig: Apply 1 application topically to the appropriate area as directed 2 (two) times a day.      Last office visit with prescribing clinician: 8/18/2022      Next office visit with prescribing clinician: 11/14/2022            Sharon Gomez MA  09/29/22, 10:14 EDT

## 2022-10-16 DIAGNOSIS — I95.1 AUTONOMIC ORTHOSTATIC HYPOTENSION: Chronic | ICD-10-CM

## 2022-10-19 RX ORDER — PYRIDOSTIGMINE BROMIDE 60 MG/1
120 TABLET ORAL 3 TIMES DAILY
Qty: 540 TABLET | Refills: 1 | Status: SHIPPED | OUTPATIENT
Start: 2022-10-19

## 2022-10-19 RX ORDER — ACETAZOLAMIDE 250 MG/1
250 TABLET ORAL 3 TIMES DAILY
Qty: 360 TABLET | Refills: 1 | OUTPATIENT
Start: 2022-10-19

## 2022-10-19 NOTE — TELEPHONE ENCOUNTER
Rx Refill Note  Requested Prescriptions     Pending Prescriptions Disp Refills    pyridostigmine (MESTINON) 60 MG tablet 540 tablet 1     Sig: Take 2 tablets by mouth 3 (Three) Times a Day.      Last office visit with prescribing clinician: 8/18/2022      Next office visit with prescribing clinician: 10/18/2022            Christy Tilley MA  10/19/22, 09:07 EDT

## 2022-11-02 RX ORDER — ACETAZOLAMIDE 250 MG/1
250 TABLET ORAL 3 TIMES DAILY
Qty: 270 TABLET | Refills: 3 | Status: SHIPPED | OUTPATIENT
Start: 2022-11-02

## 2022-11-04 ENCOUNTER — TRANSCRIBE ORDERS (OUTPATIENT)
Dept: ADMINISTRATIVE | Facility: HOSPITAL | Age: 52
End: 2022-11-04

## 2022-11-04 ENCOUNTER — LAB (OUTPATIENT)
Dept: LAB | Facility: HOSPITAL | Age: 52
End: 2022-11-04

## 2022-11-04 ENCOUNTER — TELEPHONE (OUTPATIENT)
Dept: CARDIOLOGY | Facility: CLINIC | Age: 52
End: 2022-11-04

## 2022-11-04 DIAGNOSIS — G90.9 AUTONOMIC DYSFUNCTION: Primary | ICD-10-CM

## 2022-11-04 DIAGNOSIS — G90.9 AUTONOMIC DYSFUNCTION: ICD-10-CM

## 2022-11-04 PROCEDURE — 36415 COLL VENOUS BLD VENIPUNCTURE: CPT

## 2022-11-04 PROCEDURE — 83835 ASSAY OF METANEPHRINES: CPT

## 2022-11-04 NOTE — TELEPHONE ENCOUNTER
Pt emailed this letter for you to review.        Pt has an appointment with you on 11/9/22.    PT#: [phone]

## 2022-11-08 DIAGNOSIS — G90.A POTS (POSTURAL ORTHOSTATIC TACHYCARDIA SYNDROME): Primary | ICD-10-CM

## 2022-11-08 DIAGNOSIS — R06.02 SOB (SHORTNESS OF BREATH): ICD-10-CM

## 2022-11-08 DIAGNOSIS — R07.89 OTHER CHEST PAIN: ICD-10-CM

## 2022-11-08 DIAGNOSIS — G90.9 AUTONOMIC DYSFUNCTION: ICD-10-CM

## 2022-11-09 DIAGNOSIS — K20.0 EOSINOPHILIC ESOPHAGITIS: Primary | ICD-10-CM

## 2022-11-14 ENCOUNTER — OFFICE VISIT (OUTPATIENT)
Dept: INTERNAL MEDICINE | Facility: CLINIC | Age: 52
End: 2022-11-14

## 2022-11-14 VITALS
WEIGHT: 204 LBS | SYSTOLIC BLOOD PRESSURE: 128 MMHG | DIASTOLIC BLOOD PRESSURE: 90 MMHG | HEART RATE: 107 BPM | TEMPERATURE: 97.7 F | OXYGEN SATURATION: 94 % | BODY MASS INDEX: 27.63 KG/M2 | HEIGHT: 72 IN

## 2022-11-14 DIAGNOSIS — K58.0 IRRITABLE BOWEL SYNDROME WITH DIARRHEA: ICD-10-CM

## 2022-11-14 DIAGNOSIS — T50.B95A ADVERSE EFFECT OF COVID-19 VACCINE: ICD-10-CM

## 2022-11-14 DIAGNOSIS — Z86.010 HISTORY OF COLON POLYPS: ICD-10-CM

## 2022-11-14 DIAGNOSIS — H10.13 ALLERGIC CONJUNCTIVITIS OF BOTH EYES: ICD-10-CM

## 2022-11-14 DIAGNOSIS — I95.1 AUTONOMIC ORTHOSTATIC HYPOTENSION: Chronic | ICD-10-CM

## 2022-11-14 DIAGNOSIS — K20.0 EOSINOPHILIC ESOPHAGITIS: ICD-10-CM

## 2022-11-14 DIAGNOSIS — Z00.00 ANNUAL PHYSICAL EXAM: Primary | ICD-10-CM

## 2022-11-14 LAB
METANEPH FREE SERPL-MCNC: 23.8 PG/ML (ref 0–88)
NORMETANEPHRINE SERPL-MCNC: 59.5 PG/ML (ref 0–218.9)

## 2022-11-14 PROCEDURE — 99396 PREV VISIT EST AGE 40-64: CPT | Performed by: FAMILY MEDICINE

## 2022-11-14 RX ORDER — ALBUTEROL SULFATE 90 UG/1
2 AEROSOL, METERED RESPIRATORY (INHALATION) EVERY 4 HOURS PRN
Qty: 18 G | Refills: 1 | Status: SHIPPED | OUTPATIENT
Start: 2022-11-14

## 2022-11-14 RX ORDER — BUDESONIDE 3 MG/1
CAPSULE, COATED PELLETS ORAL
Qty: 90 CAPSULE | Refills: 2 | Status: SHIPPED | OUTPATIENT
Start: 2022-11-14 | End: 2023-02-12

## 2022-11-14 NOTE — PROGRESS NOTES
Chief Complaint   Patient presents with   • Annual Exam     Physical  Pt would like to address ongoing abdominal pain.. also issues mentioned on document patient brought with her.. also will you take over prescribing the eye drops       Patient Name: Nadja Saez is a 52 y.o. female presenting for Annual Exam (Physical/Pt would like to address ongoing abdominal pain.. also issues mentioned on document patient brought with her.. also will you take over prescribing the eye drops)      Well Adult Physical   Patient here for a comprehensive physical exam.The patient reports problems - Ongoing POTS, flare in abdominal pain--wants budesonide refilled while waiting to see GI.    Do you take any herbs or supplements that were not prescribed by a doctor? no   Are you taking calcium supplements? no   Are you taking aspirin daily? no     History:  Any STD's in the past? none    Nadja Shipley 52 y.o. female who presents for an Annual Wellness Visit.  she has a history of   Patient Active Problem List   Diagnosis   • Chronic neck pain   • Rosacea   • Insomnia   • Annual physical exam   • Hot flashes   • Screening for colon cancer   • History of colon polyps   • Irritable bowel syndrome with diarrhea   • Autonomic orthostatic hypotension   • Supine hypertension   • Adverse effect of COVID-19 vaccine   • Orthostatic hypertension   • Left ventricular diastolic dysfunction   • Idiopathic intracranial hypertension   • Metabolic syndrome   • Allergic conjunctivitis of both eyes   • Eosinophilic esophagitis   .  she has been doing well with new interval problems.      Health Habits:  Dental Exam. up to date  Eye Exam. up to date  Exercise: 0 times/week.  Current exercise activities include: none    Menstrual history:  Last pap date:   Abnormal pap?   Mammogram:  Dexa:  Colonoscopy:  Tob use:  EtOH use:  Qualifies for lung Ca screening?      The following portions of the patient's history were reviewed and  updated as appropriate: allergies, current medications, past family history, past medical history, past social history, past surgical history and problem list.    Review of Systems   Constitutional: Positive for fatigue.   Gastrointestinal: Positive for abdominal pain.   Neurological: Positive for dizziness and light-headedness.       Lanolin      Current Outpatient Medications:   •  acetaZOLAMIDE (DIAMOX) 250 MG tablet, Take 1 tablet by mouth 3 (Three) Times a Day., Disp: 270 tablet, Rfl: 3  •  amitriptyline (ELAVIL) 25 MG tablet, Take 2 tablets (50 mg) by mouth every night at bedtime., Disp: 180 tablet, Rfl: 1  •  Crisaborole (Eucrisa) 2 % ointment, Apply 1 application topically to the appropriate area as directed 2 (two) times a day., Disp: 60 g, Rfl: 2  •  dicyclomine (BENTYL) 20 MG tablet, Take 1 tablet by mouth Every 6 (Six) Hours., Disp: 120 tablet, Rfl: 5  •  Ketotifen Fumarate powder, , Disp: , Rfl:   •  levothyroxine (SYNTHROID, LEVOTHROID) 75 MCG tablet, Take 1 tablet by mouth Daily., Disp: 90 tablet, Rfl: 3  •  losartan (COZAAR) 100 MG tablet, Take 1 tablet by mouth at Bedtime., Disp: 90 tablet, Rfl: 3  •  methocarbamol (ROBAXIN) 750 MG tablet, Take 1 tablet by mouth Daily., Disp: 90 tablet, Rfl: 3  •  olopatadine (PATANOL) 0.1 % ophthalmic solution, Administer 1 drop to both eyes 2 (Two) Times a Day As Needed., Disp: 5 mL, Rfl: 11  •  pantoprazole (PROTONIX) 40 MG EC tablet, Take 1 tablet by mouth Daily., Disp: 180 tablet, Rfl: 3  •  pentoxifylline (TRENtal) 400 MG CR tablet, Take 1 tablet by mouth 3 (Three) Times a Day., Disp: 270 tablet, Rfl: 1  •  pyridostigmine (MESTINON) 60 MG tablet, Take 2 tablets by mouth 3 (Three) Times a Day., Disp: 540 tablet, Rfl: 1  •  traZODone (DESYREL) 50 MG tablet, Take 1 tablet by mouth At Night As Needed for Sleep., Disp: 90 tablet, Rfl: 1  •  albuterol sulfate  (90 Base) MCG/ACT inhaler, Inhale 2 puffs Every 4 (Four) Hours As Needed for Wheezing., Disp: 18 g,  "Rfl: 1  •  Budesonide (Entocort EC) 3 MG 24 hr capsule, Take 3 capsules by mouth Every Morning for 30 days, THEN 2 capsules Every Morning for 30 days, THEN 1 capsule Every Morning for 30 days., Disp: 90 capsule, Rfl: 2  •  famotidine (PEPCID) 40 MG tablet, Take 1 tablet by mouth 2 (Two) Times a Day., Disp: 180 tablet, Rfl: 3    OBJECTIVE    /90 (BP Location: Right arm)   Pulse 107   Temp 97.7 °F (36.5 °C)   Ht 182 cm (71.65\")   Wt 92.5 kg (204 lb)   SpO2 94%   BMI 27.94 kg/m²     Physical Exam  Vitals and nursing note reviewed.   Constitutional:       General: She is not in acute distress.     Appearance: She is ill-appearing (somewhat).      Comments: Lying on exam table.  Uses a wheelchair.   HENT:      Head: Normocephalic and atraumatic.      Mouth/Throat:      Mouth: Mucous membranes are moist.   Eyes:      Extraocular Movements: Extraocular movements intact.      Conjunctiva/sclera: Conjunctivae normal.   Cardiovascular:      Rate and Rhythm: Normal rate and regular rhythm.      Heart sounds: Normal heart sounds.   Pulmonary:      Effort: Pulmonary effort is normal. No respiratory distress.      Breath sounds: Normal breath sounds.   Abdominal:      General: Bowel sounds are normal. There is no distension.      Palpations: Abdomen is soft.      Tenderness: There is no abdominal tenderness. There is no guarding.   Musculoskeletal:      Cervical back: Neck supple. No rigidity.      Right lower leg: No edema.      Left lower leg: No edema.   Lymphadenopathy:      Cervical: No cervical adenopathy.   Skin:     General: Skin is warm and dry.   Neurological:      General: No focal deficit present.      Mental Status: She is alert and oriented to person, place, and time.   Psychiatric:         Mood and Affect: Mood normal.         Behavior: Behavior normal.         Common labs    Common Labs 6/20/22 6/22/22 7/27/22   Glucose  91    BUN  15    Creatinine 0.70 0.71    Sodium  141 140   Potassium  3.7 3.0 (A) "   Chloride  106 103   Calcium  9.3    Albumin  4.20    Total Bilirubin  0.3    Alkaline Phosphatase  116    AST (SGOT)  47 (A)    ALT (SGPT)  71 (A)    (A) Abnormal value       Comments are available for some flowsheets but are not being displayed.                ASSESSMENT AND PLAN  Diagnoses and all orders for this visit:    1. Annual physical exam (Primary)  -     Mammo screening digital tomosynthesis bilateral w CAD; Future    2. Autonomic orthostatic hypotension    3. Adverse effect of COVID-19 vaccine    4. Allergic conjunctivitis of both eyes    5. Irritable bowel syndrome with diarrhea    6. Eosinophilic esophagitis  -     Budesonide (Entocort EC) 3 MG 24 hr capsule; Take 3 capsules by mouth Every Morning for 30 days, THEN 2 capsules Every Morning for 30 days, THEN 1 capsule Every Morning for 30 days.  Dispense: 90 capsule; Refill: 2    7. History of colon polyps    Other orders  -     albuterol sulfate  (90 Base) MCG/ACT inhaler; Inhale 2 puffs Every 4 (Four) Hours As Needed for Wheezing.  Dispense: 18 g; Refill: 1      Mammogram ordered. Tdap UTD.  Flu vaccine declined.    Notes from Dr. López at [facility] reviewed.    Refilled budesonide; GI appointment pending.    Discussed healthy diet, exercise, cancer screening, immunizations, and preventive care.  Hm tab updated.  Encouraged seat belt use.  No texting while driving. Orders placed as below.     Encouraged covid vaccination if not already done.  Covid vaccination can be scheduled at [email or web address]    begin progressive daily aerobic exercise program, continue current medications and return for routine annual checkups    [unfilled]    Return in about 3 months (around 2/14/2023).

## 2022-11-21 RX ORDER — OLOPATADINE HYDROCHLORIDE 1 MG/ML
1 SOLUTION/ DROPS OPHTHALMIC 2 TIMES DAILY PRN
Qty: 5 ML | Refills: 11 | Status: SHIPPED | OUTPATIENT
Start: 2022-11-21

## 2022-11-23 ENCOUNTER — HOSPITAL ENCOUNTER (OUTPATIENT)
Dept: MRI IMAGING | Facility: HOSPITAL | Age: 52
Discharge: HOME | End: 2022-11-23
Admitting: INTERNAL MEDICINE

## 2022-11-23 PROCEDURE — A9577 INJ MULTIHANCE: HCPCS | Performed by: INTERNAL MEDICINE

## 2022-11-23 PROCEDURE — 82565 ASSAY OF CREATININE: CPT

## 2022-11-23 PROCEDURE — 75561 CARDIAC MRI FOR MORPH W/DYE: CPT | Performed by: INTERNAL MEDICINE

## 2022-11-23 PROCEDURE — 0 GADOBENATE DIMEGLUMINE 529 MG/ML SOLUTION: Performed by: INTERNAL MEDICINE

## 2022-11-23 PROCEDURE — 75561 CARDIAC MRI FOR MORPH W/DYE: CPT

## 2022-11-23 RX ADMIN — GADOBENATE DIMEGLUMINE 19 ML: 529 INJECTION, SOLUTION INTRAVENOUS at 10:52

## 2022-11-23 NOTE — PROGRESS NOTES
Reviewed results with Nadja Shipley and patient verbalized understanding of results.    Thank you,  Lissette Avilez RN  Triage Nurse G

## 2022-11-25 LAB — CREAT BLDA-MCNC: 1 MG/DL (ref 0.6–1.3)

## 2022-11-30 ENCOUNTER — OFFICE VISIT (OUTPATIENT)
Dept: CARDIOLOGY | Facility: CLINIC | Age: 52
End: 2022-11-30

## 2022-11-30 VITALS
OXYGEN SATURATION: 97 % | DIASTOLIC BLOOD PRESSURE: 86 MMHG | SYSTOLIC BLOOD PRESSURE: 132 MMHG | BODY MASS INDEX: 27.63 KG/M2 | WEIGHT: 204 LBS | HEIGHT: 72 IN | HEART RATE: 89 BPM

## 2022-11-30 DIAGNOSIS — T50.B95A ADVERSE EFFECT OF COVID-19 VACCINE: Primary | ICD-10-CM

## 2022-11-30 PROCEDURE — 99213 OFFICE O/P EST LOW 20 MIN: CPT | Performed by: INTERNAL MEDICINE

## 2022-12-01 PROBLEM — I51.9 LEFT VENTRICULAR DIASTOLIC DYSFUNCTION: Status: RESOLVED | Noted: 2022-08-18 | Resolved: 2022-12-01

## 2022-12-02 ENCOUNTER — HOSPITAL ENCOUNTER (OUTPATIENT)
Dept: MAMMOGRAPHY | Facility: HOSPITAL | Age: 52
Discharge: HOME | End: 2022-12-02
Admitting: FAMILY MEDICINE

## 2022-12-02 DIAGNOSIS — Z00.00 ANNUAL PHYSICAL EXAM: ICD-10-CM

## 2022-12-02 PROCEDURE — 77063 BREAST TOMOSYNTHESIS BI: CPT

## 2022-12-02 PROCEDURE — 77067 SCR MAMMO BI INCL CAD: CPT

## 2022-12-07 NOTE — TELEPHONE ENCOUNTER
Hospitalist Progress Note   Admit Date:  12/3/2022  2:56 PM   Name:  Michael Riley   Age:  82 y.o. Sex:  female  :  1935   MRN:  [id number]   Room:  3101/    Reason(s) for Admission: Acute respiratory failure ([facility] [J96.00]  Acute congestive heart failure, unspecified heart failure type [facility]) [I50.9]     Hospital Course & Interval History:   Mrs. Calos Lord is an 83 y/o female with a h/o atrial fibrillation, hypothyroidism, bradycardia with PPM, fibromyalgia and GERD who was admitted to our service on 12/3 with acute hypoxemic respiratory failure felt due to atypical pneumonia vs pulmonary edema. She had a normal TTE In 2022. She was started on CPAP, IV Lasix and a nitroglycerin drip and admitted to ICU. Cardiology and Pulmonology consulted. RVP negative. Doxycycline added on . Weaned of nitro drip . She had minimal improvement with IV diuretics and CXR was unchanged so Lasix was discontinued on . CT chest  showed pan-lobar GGOs, cefepime and IV steroids were added. Subjective/24hr Events (22): Patient seen and evaluated. New patient for me today. On Airvo. Tolerating CPAP at night. Feels that breathing is slightly improved  Afebrile overnight  Denies chest pain nausea vomiting or chills  Family at bedside all questions answered      Assessment & Plan:   # Acute hypoxemic respiratory failure              - RA sats reportedly in the 50s on arrival. CXR showed atypical infiltrates vs pulmonary edema. She was started on IV Lasix and Cardiology consulted. Echo  is normal (as was TTE in Aug) and no significant urine output or improvement with Lasix so dc on . Repeat CXRs appear the same despite Lasix. - CT chest  showed \"pan-lobar\" GGOs, cefepime and IV steroids were added. Currently on Airvo with tenuous respiratory status, may require intubation. Pulm following.     2022  Of unclear etiology  Improving with IV steroids, antibiotics and IV Pt dropped off paperwork to be completed by Dr BIANCHI and then be faxed to [employer]. Fax info on forms. Placed in 's box.     Pt needs completed asap and faxed on Monday 5/23 please.      Lasix  Will continue to monitor  Wean oxygen as tolerated    # Leukocytosis  12/7/2022  Exacerbated by steroids  Currently on high dose of 1 g/day for 3 days and then will wean per pulmonology recommendations - Secondary to IV steroids, on abx, afebrile, cultures neg. # HypoK // hypoMg  12/7/2022  Resolved     # Chronic normocytic anemia // iron def anemia  12/7/2022  Remained stable around 8           # GERD  12/7/2022  Continue PPI     # Atrial fibrillation // bradycardia s/p PPM  12/7/2022  Continue flecainide Eliquis and Lopressor     # MDD // anxiety  12/7/2022  Continue Prozac     # Hypothyroidism  12/7/2022  Continue levothyroxine     Discharge Planning: Remain in ICU for now. High risk for intubation. Diet:  ADULT DIET; Regular; Low Sodium (2 gm)  DVT PPx: Eliquis  Code status: Full Code    Patient at risk for further deterioration from hypoxic respiratory failure    Patient required critical care interventions including frequent monitoring of respiratory status, oxygen saturations and vitals    Total critical care time spent: 35 min    Critical care time includes time spent at bedside performing history and exam, performing chart review, discussing findings and treatment plan with patient and/or family, discussing patient with consultants and colleagues, ordering and reviewing pertinent laboratory and radiographic evaluations, and discussing patient with nursing staff. Time excludes procedures. Hospital Problems             Last Modified POA    * (Principal) Acute respiratory failure ([address] 12/3/2022 Yes    Pacemaker 12/3/2022 Yes    Anemia, unspecified 12/3/2022 Yes    Hypokalemia 12/3/2022 Yes    Pulmonary infiltrates 12/4/2022 Yes    Acute congestive heart failure ([address] 12/5/2022 Yes    Anticoagulant long-term use 12/3/2022 Yes    Paroxysmal atrial fibrillation ([address] 12/3/2022 Yes    Overview Signed 3/19/2022 10:31 AM by Xavi, [employer]     Rare, fleeting palp in evening, nothing sustained.  No bleeding prob           HTN (hypertension), benign 12/3/2022 Yes    Long term (current) use of anticoagulants 12/3/2022 Yes    Bradycardia 12/3/2022 Yes    Overview Signed 8/16/2022  2:25 PM by Erica Mejias MD     Added automatically from request for surgery [id number]          Objective:   Patient Vitals for the past 24 hrs:   Temp Pulse Resp BP SpO2   12/07/22 0700 -- 60 22 (!) 156/64 98 %   12/07/22 0606 -- 60 23 (!) 140/63 98 %   12/07/22 0503 -- 68 24 (!) 155/70 98 %   12/07/22 0404 -- -- 24 -- --   12/07/22 0403 98.3 °F (36.8 °C) 60 27 (!) 158/72 95 %   12/07/22 0303 -- 69 24 (!) 143/70 96 %   12/07/22 0203 -- 69 -- (!) 138/95 95 %   12/07/22 0103 -- 60 23 122/61 93 %   12/07/22 0003 -- 62 (!) 33 (!) 170/88 95 %   12/06/22 2315 -- -- 28 -- --   12/06/22 2303 98 °F (36.7 °C) 81 28 (!) 153/75 95 %   12/06/22 2203 -- 69 30 (!) 152/67 98 %   12/06/22 2103 -- 66 (!) 36 -- 92 %   12/06/22 2025 -- -- 24 -- --   12/06/22 2003 -- 78 (!) 34 (!) 161/72 95 %   12/06/22 1951 -- -- 24 -- --   12/06/22 1903 97.9 °F (36.6 °C) 61 26 (!) 157/67 92 %   12/06/22 1800 -- 64 26 (!) 148/67 91 %   12/06/22 1745 -- 64 24 (!) 140/65 95 %   12/06/22 1700 -- 68 (!) 37 -- (!) 85 %   12/06/22 1600 -- 96 (!) 33 (!) 158/73 95 %   12/06/22 1500 98.6 °F (37 °C) 64 24 (!) 151/70 93 %   12/06/22 1400 -- 68 -- 136/70 98 %   12/06/22 1300 -- 63 30 (!) 131/59 (!) 88 %   12/06/22 1200 -- 70 (!) 37 (!) 146/63 (!) 84 %   12/06/22 1100 98.2 °F (36.8 °C) 73 30 (!) 176/77 90 %   12/06/22 1000 -- 76 24 (!) 120/56 91 %   12/06/22 0900 -- 66 29 (!) 164/75 93 %   12/06/22 0800 -- 64 30 (!) 167/72 97 %         Estimated body mass index is 31.6 kg/m² as calculated from the following:    Height as of this encounter: 5' 6\" (1.676 m). Weight as of this encounter: 195 lb 12.3 oz (88.8 kg).     Intake/Output Summary (Last 24 hours) at 12/7/2022 0723  Last data filed at 12/7/2022 0515  Gross per 24 hour   Intake 970 ml   Output 1575 ml   Net -605 ml Physical Exam:   Blood pressure (!) 156/64, pulse 60, temperature 98.3 °F (36.8 °C), temperature source Axillary, resp. rate 22, height 5' 6\" (1.676 m), weight 195 lb 12.3 oz (88.8 kg), SpO2 98 %. General:    Well nourished. Awake, oriented x3, SOB at rest.   Head:  Normocephalic, atraumatic  Eyes:  Sclerae appear normal. Pupils equally round. ENT:  Nares appear normal, no drainage. Dry oral mucosa  Neck:  No restricted ROM. Trachea midline. CV:   RRR. No m/r/g. No jugular venous distension. Lungs:   Bilateral rales, no wheezes or rhonchi. Airvo 60L/100% bedside sats low 90s at rest. Conversational dyspnea. Abdomen: Bowel sounds present. Soft, nontender, nondistended. Extremities: No cyanosis or clubbing. No edema. Skin:     No rashes and normal coloration. Warm and dry. Neuro:  CN II-XII grossly intact. Sensation intact. A&Ox3  Psych:  Normal mood and affect.       I have reviewed ordered lab tests and independently visualized imaging below:    Recent Labs:  Recent Results (from the past 48 hour(s))   Arterial Blood Gas, POC    Collection Time: 12/06/22  3:00 AM   Result Value Ref Range    DEVICE High Flow Nasal Cannula      FIO2 100 %    pH, Arterial, POC 7.43 7.35 - 7.45      pCO2, Arterial, POC 42.0 35 - 45 MMHG    pO2, Arterial, POC 60 (L) 75 - 100 MMHG    HCO3, Mixed 27.9 (H) 22 - 26 MMOL/L    SO2c, Arterial, POC 91.9 (L) 95 - 98 %    Base Excess 3.0 mmol/L    POC Thomas's Test Positive      Site LEFT RADIAL      Pt Temp 97.6      Specimen type: ARTERIAL      Performed by: [id number]    Basic Metabolic Panel w/ Reflex to MG    Collection Time: 12/06/22  4:28 AM   Result Value Ref Range    Sodium 133 133 - 143 mmol/L    Potassium 3.6 3.5 - 5.1 mmol/L    Chloride 100 (L) 101 - 110 mmol/L    CO2 31 21 - 32 mmol/L    Anion Gap 2 2 - 11 mmol/L    Glucose 129 (H) 65 - 100 mg/dL    BUN 27 (H) 8 - 23 MG/DL    Creatinine 0.70 0.6 - 1.0 MG/DL    Est, Glom Filt Rate >60 >60 ml/min/1.73m2 Calcium 9.8 8.3 - 10.4 MG/DL   CBC    Collection Time: 12/06/22  4:28 AM   Result Value Ref Range    WBC 15.0 (H) 4.3 - 11.1 K/uL    RBC 2.85 (L) 4.05 - 5.2 M/uL    Hemoglobin 8.0 (L) 11.7 - 15.4 g/dL    Hematocrit 26.6 (L) 35.8 - 46.3 %    MCV 93.3 82 - 102 FL    MCH 28.1 26.1 - 32.9 PG    MCHC 30.1 (L) 31.4 - 35.0 g/dL    RDW 17.9 (H) 11.9 - 14.6 %    Platelets 461 674 - 641 K/uL    MPV 9.6 9.4 - 12.3 FL    nRBC 0.00 0.0 - 0.2 K/uL   TYPE AND SCREEN    Collection Time: 12/06/22  4:28 AM   Result Value Ref Range    Crossmatch expiration date 12/09/2022,2359     ABO/Rh O POSITIVE     Antibody Screen NEG    CBC    Collection Time: 12/06/22  3:31 PM   Result Value Ref Range    WBC 16.6 (H) 4.3 - 11.1 K/uL    RBC 2.96 (L) 4.05 - 5.2 M/uL    Hemoglobin 8.4 (L) 11.7 - 15.4 g/dL    Hematocrit 27.7 (L) 35.8 - 46.3 %    MCV 93.6 82 - 102 FL    MCH 28.4 26.1 - 32.9 PG    MCHC 30.3 (L) 31.4 - 35.0 g/dL    RDW 18.1 (H) 11.9 - 14.6 %    Platelets 133 746 - 142 K/uL    MPV 9.5 9.4 - 12.3 FL    nRBC 0.00 0.0 - 0.2 K/uL   Basic Metabolic Panel w/ Reflex to MG    Collection Time: 12/07/22  4:37 AM   Result Value Ref Range    Sodium 137 133 - 143 mmol/L    Potassium 3.1 (L) 3.5 - 5.1 mmol/L    Chloride 101 101 - 110 mmol/L    CO2 29 21 - 32 mmol/L    Anion Gap 7 2 - 11 mmol/L    Glucose 137 (H) 65 - 100 mg/dL    BUN 44 (H) 8 - 23 MG/DL    Creatinine 1.00 0.6 - 1.0 MG/DL    Est, Glom Filt Rate 55 (L) >60 ml/min/1.73m2    Calcium 9.7 8.3 - 10.4 MG/DL   CBC with Auto Differential    Collection Time: 12/07/22  4:37 AM   Result Value Ref Range    WBC 14.5 (H) 4.3 - 11.1 K/uL    RBC 2.93 (L) 4.05 - 5.2 M/uL    Hemoglobin 8.2 (L) 11.7 - 15.4 g/dL    Hematocrit 27.8 (L) 35.8 - 46.3 %    MCV 94.9 82 - 102 FL    MCH 28.0 26.1 - 32.9 PG    MCHC 29.5 (L) 31.4 - 35.0 g/dL    RDW 18.0 (H) 11.9 - 14.6 %    Platelets 086 588 - 084 K/uL    MPV 9.5 9.4 - 12.3 FL    nRBC 0.00 0.0 - 0.2 K/uL    Differential Type AUTOMATED      Seg Neutrophils 90 (H) 43 - 78 %    Lymphocytes 5 (L) 13 - 44 %    Monocytes 3 (L) 4.0 - 12.0 %    Eosinophils % 0 (L) 0.5 - 7.8 %    Basophils 0 0.0 - 2.0 %    Immature Granulocytes 1 0.0 - 5.0 %    Segs Absolute 13.1 (H) 1.7 - 8.2 K/UL    Absolute Lymph # 0.8 0.5 - 4.6 K/UL    Absolute Mono # 0.5 0.1 - 1.3 K/UL    Absolute Eos # 0.0 0.0 - 0.8 K/UL    Basophils Absolute 0.0 0.0 - 0.2 K/UL    Absolute Immature Granulocyte 0.2 0.0 - 0.5 K/UL   Magnesium    Collection Time: 12/07/22  4:37 AM   Result Value Ref Range    Magnesium 1.8 1.8 - 2.4 mg/dL         Other Studies:  CT CHEST WO CONTRAST    Result Date: 12/5/2022  CT of the chest without contrast. CLINICAL INDICATION: Hypoxemia, respiratory failure, infiltrates PROCEDURE: Serial thin section axial images obtained from the thoracic inlet through the upper abdomen without the administration of intravenous contrast. Radiation dose reduction techniques were used for this study. Our CT scanners use one or all of the following: Automated exposure control, adjusted of the mA and/or kV according to patient size, iterative reconstruction COMPARISON: Chest x-ray dated 12/5/2022 FINDINGS: Shotty mediastinal lymph nodes are present. Small bilateral pleural effusions are present. There is a trace pericardial effusion. A pacer device is in place. Confluent groundglass opacity is noted throughout the lower lobes with more peripheral patchy nodular densities in the subpleural lungs. More geographic groundglass patchy opacities noted in the bilateral upper lobes. Atypical pneumonia favored. Limited evaluation of the upper abdomen is unremarkable. No aggressive bone lesions identified. 1. Pan lobar, primarily groundglass opacities with more peripheral reticular nodular interstitial density with greater involvement of the lower lobes. This pattern is nonspecific; however, atypical viral pneumonia is favored.     XR CHEST PORTABLE    Result Date: 12/5/2022  EXAMINATION: One view chest HISTORY: F/U pulmonary edema TECHNIQUE: Frontal chest. COMPARISON: 12/4/2022 FINDINGS:  Stable left-sided cardiac device. Persistent bilateral lung infiltrates. No significant interval change. There is no significant pneumothorax. There is no pleural effusion. The heart is unchanged. No other significant interval changes. 1. Findings as described above. Transthoracic echocardiogram (TTE) complete with contrast, bubble, strain, and 3D PRN    Result Date: 12/4/2022    Left Ventricle: Normal left ventricular systolic function with a visually estimated EF of 55 - 60%. Left ventricle size is normal. Normal wall thickness. Normal wall motion. Normal diastolic function. Technical qualifiers: Color flow Doppler was performed and pulse wave and/or continuous wave Doppler was performed.        Current Meds:  Current Facility-Administered Medications   Medication Dose Route Frequency    potassium chloride (KLOR-CON M) extended release tablet 40 mEq  40 mEq Oral PRN    Or    potassium bicarb-citric acid (EFFER-K) effervescent tablet 40 mEq  40 mEq Oral PRN    Or    potassium chloride 10 mEq/100 mL IVPB (Peripheral Line)  10 mEq IntraVENous PRN    benzonatate (TESSALON) capsule 100 mg  100 mg Oral TID PRN    lidocaine PF 4 % injection 5 mL  5 mL Inhalation Q6H PRN    furosemide (LASIX) injection 40 mg  40 mg IntraVENous BID    methylPREDNISolone sodium (SOLU-MEDROL) injection 60 mg  60 mg IntraVENous Q6H    cefepime (MAXIPIME) 2,000 mg in sodium chloride 0.9 % 50 mL IVPB mini-bag  2,000 mg IntraVENous Q12H    magnesium sulfate 2000 mg in 50 mL IVPB premix  2,000 mg IntraVENous PRN    magnesium sulfate 2000 mg in 50 mL IVPB premix  2,000 mg IntraVENous Once    doxycycline (VIBRAMYCIN) 100 mg in sodium chloride 0.9 % 100 mL IVPB  100 mg IntraVENous Q12H    sodium chloride flush 0.9 % injection 5 mL  5 mL IntraVENous Q8H    sodium chloride flush 0.9 % injection 10 mL  10 mL IntraVENous PRN    amitriptyline (ELAVIL) tablet 25 mg  25 mg Oral Nightly    diclofenac sodium (VOLTAREN) 1 % gel 1 g  1 g Topical 4x Daily PRN    apixaban (ELIQUIS) tablet 5 mg  5 mg Oral BID    ferrous sulfate (IRON 325) tablet 325 mg  325 mg Oral Daily    flecainide (TAMBOCOR) tablet 75 mg  75 mg Oral BID    FLUoxetine (PROZAC) capsule 60 mg  60 mg Oral Daily    hydrOXYzine HCl (ATARAX) tablet 25 mg  25 mg Oral Nightly PRN    levothyroxine (SYNTHROID) tablet 50 mcg  50 mcg Oral QAM AC    metoprolol succinate (TOPROL XL) extended release tablet 50 mg  50 mg Oral Daily    pantoprazole (PROTONIX) tablet 40 mg  40 mg Oral QAM AC    timolol (TIMOPTIC) 0.5 % ophthalmic solution 1 drop  1 drop Both Eyes Daily    mometasone-formoterol (DULERA) 200-5 MCG/ACT inhaler 2 puff  2 puff Inhalation BID    LORazepam (ATIVAN) tablet 1 mg  1 mg Oral Q12H PRN    HYDROcodone-acetaminophen (NORCO) 7.5-325 MG per tablet 1 tablet  1 tablet Oral Q6H PRN    sennosides-docusate sodium (SENOKOT-S) 8.6-50 MG tablet 2 tablet  2 tablet Oral Nightly    albuterol (PROVENTIL) nebulizer solution 2.5 mg  2.5 mg Nebulization Q8H PRN    sodium chloride flush 0.9 % injection 5-40 mL  5-40 mL IntraVENous 2 times per day    sodium chloride flush 0.9 % injection 5-40 mL  5-40 mL IntraVENous PRN    0.9 % sodium chloride infusion   IntraVENous PRN    polyethylene glycol (GLYCOLAX) packet 17 g  17 g Oral Daily PRN    acetaminophen (TYLENOL) tablet 650 mg  650 mg Oral Q6H PRN    Or    acetaminophen (TYLENOL) suppository 650 mg  650 mg Rectal Q6H PRN       Signed:  Kris Stanley MD    Part of this note may have been written by using a voice dictation software. The note has been proof read but may still contain some grammatical/other typographical errors.

## 2022-12-30 DIAGNOSIS — E03.9 HYPOTHYROIDISM, UNSPECIFIED TYPE: ICD-10-CM

## 2022-12-30 DIAGNOSIS — M54.2 CHRONIC NECK PAIN: ICD-10-CM

## 2022-12-30 DIAGNOSIS — G89.29 CHRONIC NECK PAIN: ICD-10-CM

## 2022-12-31 NOTE — TELEPHONE ENCOUNTER
Rx Refill Note  Requested Prescriptions     Pending Prescriptions Disp Refills    methocarbamol (ROBAXIN) 750 MG tablet 90 tablet 3     Sig: Take 1 tablet by mouth Daily.    levothyroxine (SYNTHROID, LEVOTHROID) 75 MCG tablet 90 tablet 3     Sig: Take 1 tablet by mouth Daily.      Last office visit with prescribing clinician: 11/14/2022   Last telemedicine visit with prescribing clinician: 2/16/2023   Next office visit with prescribing clinician: 2/16/2023                         Would you like a call back once the refill request has been completed: [] Yes [] No    If the office needs to give you a call back, can they leave a voicemail: [] Yes [] No    Christy Tilley MA  12/31/22, 14:04 EST

## 2023-01-04 RX ORDER — LEVOTHYROXINE SODIUM 0.07 MG/1
75 TABLET ORAL DAILY
Qty: 90 TABLET | Refills: 3 | Status: SHIPPED | OUTPATIENT
Start: 2023-01-04

## 2023-01-04 RX ORDER — METHOCARBAMOL 750 MG/1
750 TABLET, FILM COATED ORAL DAILY
Qty: 90 TABLET | Refills: 3 | Status: SHIPPED | OUTPATIENT
Start: 2023-01-04

## 2023-01-10 RX ORDER — TRAZODONE HYDROCHLORIDE 50 MG/1
50 TABLET ORAL NIGHTLY PRN
Qty: 90 TABLET | Refills: 1 | Status: SHIPPED | OUTPATIENT
Start: 2023-01-10

## 2023-01-26 RX ORDER — CRISABOROLE 20 MG/G
1 OINTMENT TOPICAL 2 TIMES DAILY
Qty: 60 G | Refills: 2 | Status: SHIPPED | OUTPATIENT
Start: 2023-01-26

## 2023-01-26 NOTE — TELEPHONE ENCOUNTER
Rx Refill Note  Requested Prescriptions     Pending Prescriptions Disp Refills    Crisaborole (Eucrisa) 2 % ointment 60 g 2     Sig: Apply 1 application topically to the appropriate area as directed 2 (two) times a day.      Last office visit with prescribing clinician: 11/14/2022   Last telemedicine visit with prescribing clinician: 2/16/2023   Next office visit with prescribing clinician: 2/16/2023                         Would you like a call back once the refill request has been completed: [] Yes [] No    If the office needs to give you a call back, can they leave a voicemail: [] Yes [] No    Sharon Gomez MA  01/26/23, 08:49 EST

## 2023-02-01 ENCOUNTER — TELEPHONE (OUTPATIENT)
Dept: INTERNAL MEDICINE | Facility: CLINIC | Age: 53
End: 2023-02-01
Payer: COMMERCIAL

## 2023-02-01 NOTE — TELEPHONE ENCOUNTER
Pt is wanting to have her labs done before her appointment with PCP on Feb 16 th. Pt stated she is specifically wanting her thyroid levels checked, D-dimer checked,Hymocystine, Ferritin,and along with Cholesterol.please advise when you have placed the orders and I will schedule labs.

## 2023-02-01 NOTE — TELEPHONE ENCOUNTER
Rx Refill Note  Requested Prescriptions     Pending Prescriptions Disp Refills   • naltrexone 1 mg/mL oral suspension 120 mL 5     Sig: Take 1 mL by mouth Every Night for 7 days, THEN 2 mL Every Night for 7 days, THEN 3 mL Every Night for 7 days, THEN 4 mL Every Night.     Signed Prescriptions Disp Refills   • Crisaborole (Eucrisa) 2 % ointment 60 g 2     Sig: Apply 1 application topically to the appropriate area as directed 2 (Two) Times a Day.     Authorizing Provider: JONATHAN GENTILE      Last office visit with prescribing clinician: 11/14/2022   Last telemedicine visit with prescribing clinician: 2/16/2023   Next office visit with prescribing clinician: 2/16/2023                         Would you like a call back once the refill request has been completed: [] Yes [] No    If the office needs to give you a call back, can they leave a voicemail: [] Yes [] No    Isabelle Preston MA  02/01/23, 15:31 EST

## 2023-02-02 ENCOUNTER — TELEPHONE (OUTPATIENT)
Dept: INTERNAL MEDICINE | Facility: CLINIC | Age: 53
End: 2023-02-02

## 2023-02-02 NOTE — TELEPHONE ENCOUNTER
Caller: Nadja Shipley    Relationship: Self    Best call back number: [phone]    What was the call regarding: PATIENT IS CALLING STATING THAT THE PRESCRIPTION FOR THE naltrexone 1 mg/mL oral suspension NEEDS TO BE SENT TO THE [facility], [place] - [address] [facility] [phone] [facility] [phone] FX DUE TO THIS BEING A COMPOUND MEDICATION.     PLEASE CALL TO LET PATIENT KNOW ONCE THIS HAS BEEN SENT.

## 2023-02-05 DIAGNOSIS — K58.0 IRRITABLE BOWEL SYNDROME WITH DIARRHEA: ICD-10-CM

## 2023-02-07 RX ORDER — AMITRIPTYLINE HYDROCHLORIDE 25 MG/1
50 TABLET, FILM COATED ORAL
Qty: 180 TABLET | Refills: 1 | Status: SHIPPED | OUTPATIENT
Start: 2023-02-07

## 2023-02-07 NOTE — TELEPHONE ENCOUNTER
Called and spoke with patient and informed her that I had faxed the medication to [facility] in [place] for her, she stated her understanding

## 2023-02-07 NOTE — TELEPHONE ENCOUNTER
Rx Refill Note  Requested Prescriptions     Pending Prescriptions Disp Refills    pentoxifylline (TRENtal) 400 MG CR tablet 270 tablet 1     Sig: Take 1 tablet by mouth 3 (Three) Times a Day.     Signed Prescriptions Disp Refills    amitriptyline (ELAVIL) 25 MG tablet 180 tablet 1     Sig: Take 2 tablets (50 mg) by mouth every night at bedtime.     Authorizing Provider: JONATHAN GENTILE     Ordering User: NADYA GOMEZ      Last office visit with prescribing clinician: 11/14/2022   Last telemedicine visit with prescribing clinician: 2/16/2023   Next office visit with prescribing clinician: 2/16/2023                         Would you like a call back once the refill request has been completed: [] Yes [] No    If the office needs to give you a call back, can they leave a voicemail: [] Yes [] No    Nadya Gomez MA  02/07/23, 14:35 EST

## 2023-02-08 ENCOUNTER — OFFICE (OUTPATIENT)
Dept: URBAN - METROPOLITAN AREA CLINIC 76 | Facility: CLINIC | Age: 53
End: 2023-02-08
Payer: MEDICAID

## 2023-02-08 VITALS
SYSTOLIC BLOOD PRESSURE: 100 MMHG | DIASTOLIC BLOOD PRESSURE: 78 MMHG | HEART RATE: 90 BPM | WEIGHT: 201 LBS | OXYGEN SATURATION: 96 %

## 2023-02-08 DIAGNOSIS — U09.9 COVID-19 LONG HAULER: ICD-10-CM

## 2023-02-08 DIAGNOSIS — G90.A POSTURAL ORTHOSTATIC TACHYCARDIA SYNDROME [POTS]: ICD-10-CM

## 2023-02-08 DIAGNOSIS — Z00.00 ANNUAL PHYSICAL EXAM: Primary | ICD-10-CM

## 2023-02-08 DIAGNOSIS — K21.9 GASTRO-ESOPHAGEAL REFLUX DISEASE WITHOUT ESOPHAGITIS: ICD-10-CM

## 2023-02-08 DIAGNOSIS — R19.7 DIARRHEA, UNSPECIFIED: ICD-10-CM

## 2023-02-08 DIAGNOSIS — Z83.71 FAMILY HISTORY OF COLONIC POLYPS: ICD-10-CM

## 2023-02-08 DIAGNOSIS — G90.A POTS (POSTURAL ORTHOSTATIC TACHYCARDIA SYNDROME): ICD-10-CM

## 2023-02-08 DIAGNOSIS — K58.9 IRRITABLE BOWEL SYNDROME WITHOUT DIARRHEA: ICD-10-CM

## 2023-02-08 DIAGNOSIS — Z86.010 PERSONAL HISTORY OF COLONIC POLYPS: ICD-10-CM

## 2023-02-08 PROCEDURE — 99204 OFFICE O/P NEW MOD 45 MIN: CPT | Performed by: INTERNAL MEDICINE

## 2023-02-08 RX ORDER — RIFAXIMIN 550 MG/1
1650 TABLET ORAL
Qty: 42 | Refills: 0 | Status: ACTIVE
Start: 2023-02-08

## 2023-02-08 RX ORDER — PENTOXIFYLLINE 400 MG/1
400 TABLET, EXTENDED RELEASE ORAL 3 TIMES DAILY
Qty: 270 TABLET | Refills: 1 | Status: SHIPPED | OUTPATIENT
Start: 2023-02-08

## 2023-02-10 ENCOUNTER — LAB (OUTPATIENT)
Dept: LAB | Facility: HOSPITAL | Age: 53
End: 2023-02-10
Payer: COMMERCIAL

## 2023-02-10 PROCEDURE — 80061 LIPID PANEL: CPT | Performed by: FAMILY MEDICINE

## 2023-02-10 PROCEDURE — 80050 GENERAL HEALTH PANEL: CPT | Performed by: FAMILY MEDICINE

## 2023-02-10 PROCEDURE — 82607 VITAMIN B-12: CPT | Performed by: FAMILY MEDICINE

## 2023-02-10 PROCEDURE — 84439 ASSAY OF FREE THYROXINE: CPT | Performed by: FAMILY MEDICINE

## 2023-02-10 PROCEDURE — 83036 HEMOGLOBIN GLYCOSYLATED A1C: CPT | Performed by: FAMILY MEDICINE

## 2023-02-10 PROCEDURE — 82728 ASSAY OF FERRITIN: CPT | Performed by: FAMILY MEDICINE

## 2023-02-10 PROCEDURE — 83090 ASSAY OF HOMOCYSTEINE: CPT | Performed by: FAMILY MEDICINE

## 2023-02-10 PROCEDURE — 85379 FIBRIN DEGRADATION QUANT: CPT | Performed by: FAMILY MEDICINE

## 2023-02-10 PROCEDURE — 82306 VITAMIN D 25 HYDROXY: CPT | Performed by: FAMILY MEDICINE

## 2023-02-14 NOTE — TELEPHONE ENCOUNTER
Labs were ordered, but I am just now seeing this message. Pt comes in on 2/16/23 so it is too late to have the labs drawn first, will notify pt to try to be fasting when she comes in for appointment

## 2023-02-16 ENCOUNTER — OFFICE VISIT (OUTPATIENT)
Dept: INTERNAL MEDICINE | Facility: CLINIC | Age: 53
End: 2023-02-16
Payer: COMMERCIAL

## 2023-02-16 VITALS
OXYGEN SATURATION: 99 % | SYSTOLIC BLOOD PRESSURE: 114 MMHG | WEIGHT: 201 LBS | HEIGHT: 72 IN | TEMPERATURE: 97.6 F | DIASTOLIC BLOOD PRESSURE: 84 MMHG | HEART RATE: 52 BPM | BODY MASS INDEX: 27.22 KG/M2

## 2023-02-16 DIAGNOSIS — I95.1 AUTONOMIC ORTHOSTATIC HYPOTENSION: Primary | ICD-10-CM

## 2023-02-16 DIAGNOSIS — R73.01 ELEVATED FASTING BLOOD SUGAR: ICD-10-CM

## 2023-02-16 DIAGNOSIS — R47.81 SLURRED SPEECH: ICD-10-CM

## 2023-02-16 PROCEDURE — 99214 OFFICE O/P EST MOD 30 MIN: CPT | Performed by: FAMILY MEDICINE

## 2023-02-16 RX ORDER — RIFAXIMIN 550 MG/1
TABLET ORAL
COMMUNITY
Start: 2023-02-15

## 2023-02-16 NOTE — PROGRESS NOTES
"Subjective   Nadja Shipley is a 52 y.o. female presenting today for follow up of   Chief Complaint   Patient presents with   • Follow-up   • Dizziness     POTS       History of Present Illness     Pt presents for 3 month f/u POTS.  She has recently started working with Dr. Jaramillo, with [facility], naturopathic medicine.  She has recently started an \"elemental diet.\"  She also started naltrexone this week and says it's too soon to see if it's helping.  She has seen Dr. Bailey and [facility], Dr. Carrasquillo.  She saw Dr. Monreal, GI.  She is upset that no one will keep her on budesonide for her eosinophilic esophagitis as it helps with her pain.  She has a new grandson which has been enjoyable.  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  She is now unable to work or do much at all.    Patient Active Problem List   Diagnosis   • Chronic neck pain   • Rosacea   • Insomnia   • Annual physical exam   • Hot flashes   • Screening for colon cancer   • History of colon polyps   • Irritable bowel syndrome with diarrhea   • Autonomic orthostatic hypotension   • Supine hypertension   • Adverse effect of COVID-19 vaccine   • Orthostatic hypertension   • Idiopathic intracranial hypertension   • Metabolic syndrome   • Allergic conjunctivitis of both eyes   • Eosinophilic esophagitis       Current Outpatient Medications on File Prior to Visit   Medication Sig   • acetaZOLAMIDE (DIAMOX) 250 MG tablet Take 1 tablet by mouth 3 (Three) Times a Day.   • amitriptyline (ELAVIL) 25 MG tablet Take 2 tablets (50 mg) by mouth every night at bedtime.   • Crisaborole (Eucrisa) 2 % ointment Apply 1 application topically to the appropriate area as directed 2 (Two) Times a Day.   • dicyclomine (BENTYL) 20 MG tablet Take 1 tablet by mouth Every 6 (Six) Hours.   • " famotidine (PEPCID) 40 MG tablet Take 1 tablet by mouth 2 (Two) Times a Day.   • Ketotifen Fumarate powder    • levothyroxine (SYNTHROID, LEVOTHROID) 75 MCG tablet Take 1 tablet by mouth Daily.   • losartan (COZAAR) 100 MG tablet Take 1 tablet by mouth at Bedtime.   • methocarbamol (ROBAXIN) 750 MG tablet Take 1 tablet by mouth Daily.   • naltrexone 1 mg/mL oral suspension Take 1 mL by mouth Every Night for 7 days, THEN 2 mL Every Night for 7 days, THEN 3 mL Every Night for 7 days, THEN 4 mL Every Night.   • olopatadine (PATANOL) 0.1 % ophthalmic solution Administer 1 drop to both eyes 2 (Two) Times a Day As Needed for Allergies.   • pentoxifylline (TRENtal) 400 MG CR tablet Take 1 tablet by mouth 3 (Three) Times a Day.   • pyridostigmine (MESTINON) 60 MG tablet Take 2 tablets by mouth 3 (Three) Times a Day.   • traZODone (DESYREL) 50 MG tablet Take 1 tablet by mouth At Night As Needed for Sleep. (Patient taking differently: Take 50 mg by mouth At Night As Needed for Sleep. Patient is just taking 25 mg unless she feels she needs the 50 to sleep.)   • albuterol sulfate  (90 Base) MCG/ACT inhaler Inhale 2 puffs Every 4 (Four) Hours As Needed for Wheezing.   • Xifaxan 550 MG tablet      No current facility-administered medications on file prior to visit.          The following portions of the patient's history were reviewed and updated as appropriate: allergies, current medications, past family history, past medical history, past social history, past surgical history and problem list.    Review of Systems   Constitutional: Positive for activity change, appetite change and fatigue.   Eyes: Positive for blurred vision.   Cardiovascular: Positive for palpitations.   Gastrointestinal: Positive for indigestion.   Neurological: Positive for speech difficulty, weakness, light-headedness and memory problem.   Psychiatric/Behavioral: Positive for sleep disturbance.       Objective   Vitals:    02/16/23 1310 02/16/23 1342  "  BP:  114/84   Pulse: 52    Temp: 97.6 °F (36.4 °C)    SpO2: 99%    Weight: 91.2 kg (201 lb)    Height: 182 cm (71.65\")        BP Readings from Last 3 Encounters:   02/16/23 114/84   11/30/22 132/86   11/14/22 128/90        Wt Readings from Last 3 Encounters:   02/16/23 91.2 kg (201 lb)   11/30/22 92.5 kg (204 lb)   11/14/22 92.5 kg (204 lb)        Body mass index is 27.52 kg/m².  Nursing notes and vitals reviewed.    Physical Exam  Vitals and nursing note reviewed.   Constitutional:       General: She is not in acute distress.     Appearance: She is ill-appearing (somewhat).      Comments: Lying on exam table.  Using a cane today.   HENT:      Head: Normocephalic and atraumatic.      Mouth/Throat:      Mouth: Mucous membranes are moist.   Eyes:      Extraocular Movements: Extraocular movements intact.      Conjunctiva/sclera: Conjunctivae normal.   Cardiovascular:      Rate and Rhythm: Normal rate and regular rhythm.      Heart sounds: Normal heart sounds.   Pulmonary:      Effort: Pulmonary effort is normal. No respiratory distress.      Breath sounds: Normal breath sounds.   Abdominal:      General: Bowel sounds are normal. There is no distension.      Palpations: Abdomen is soft.      Tenderness: There is no abdominal tenderness. There is no guarding.   Musculoskeletal:      Cervical back: Neck supple. No rigidity.      Right lower leg: No edema.      Left lower leg: No edema.   Lymphadenopathy:      Cervical: No cervical adenopathy.   Skin:     General: Skin is warm and dry.   Neurological:      General: No focal deficit present.      Mental Status: She is alert and oriented to person, place, and time.   Psychiatric:         Mood and Affect: Mood normal.         Behavior: Behavior normal.         Recent Results (from the past 672 hour(s))   T4, Free    Collection Time: 02/10/23  3:25 PM    Specimen: Blood   Result Value Ref Range    Free T4 1.32 0.93 - 1.70 ng/dL   Comprehensive Metabolic Panel    Collection " Time: 02/10/23  3:25 PM    Specimen: Blood   Result Value Ref Range    Glucose 109 (H) 65 - 99 mg/dL    BUN 16 6 - 20 mg/dL    Creatinine 0.91 0.57 - 1.00 mg/dL    Sodium 138 136 - 145 mmol/L    Potassium 3.8 3.5 - 5.2 mmol/L    Chloride 106 98 - 107 mmol/L    CO2 23.8 22.0 - 29.0 mmol/L    Calcium 9.9 8.6 - 10.5 mg/dL    Total Protein 7.5 6.0 - 8.5 g/dL    Albumin 4.3 3.5 - 5.2 g/dL    ALT (SGPT) 21 1 - 33 U/L    AST (SGOT) 16 1 - 32 U/L    Alkaline Phosphatase 106 39 - 117 U/L    Total Bilirubin 0.3 0.0 - 1.2 mg/dL    Globulin 3.2 gm/dL    A/G Ratio 1.3 g/dL    BUN/Creatinine Ratio 17.6 7.0 - 25.0    Anion Gap 8.2 5.0 - 15.0 mmol/L    eGFR 77.0 >60.0 mL/min/1.73   Hemoglobin A1c    Collection Time: 02/10/23  3:25 PM    Specimen: Blood   Result Value Ref Range    Hemoglobin A1C 6.20 (H) 4.80 - 5.60 %   Lipid Panel With / Chol / HDL Ratio    Collection Time: 02/10/23  3:25 PM    Specimen: Blood   Result Value Ref Range    Total Cholesterol 219 (H) 0 - 200 mg/dL    Triglycerides 115 0 - 150 mg/dL    HDL Cholesterol 56 40 - 60 mg/dL    LDL Cholesterol  143 (H) 0 - 100 mg/dL    VLDL Cholesterol 20 5 - 40 mg/dL    Chol/HDL Ratio 3.91    TSH    Collection Time: 02/10/23  3:25 PM    Specimen: Blood   Result Value Ref Range    TSH 2.550 0.270 - 4.200 uIU/mL   Vitamin B12    Collection Time: 02/10/23  3:25 PM    Specimen: Blood   Result Value Ref Range    Vitamin B-12 1,145 (H) 211 - 946 pg/mL   Vitamin D,25-Hydroxy    Collection Time: 02/10/23  3:25 PM    Specimen: Blood   Result Value Ref Range    25 Hydroxy, Vitamin D 83.4 30.0 - 100.0 ng/ml   CBC Auto Differential    Collection Time: 02/10/23  3:25 PM    Specimen: Blood   Result Value Ref Range    WBC 6.51 3.40 - 10.80 10*3/mm3    RBC 4.69 3.77 - 5.28 10*6/mm3    Hemoglobin 14.4 12.0 - 15.9 g/dL    Hematocrit 43.1 34.0 - 46.6 %    MCV 91.9 79.0 - 97.0 fL    MCH 30.7 26.6 - 33.0 pg    MCHC 33.4 31.5 - 35.7 g/dL    RDW 13.0 12.3 - 15.4 %    RDW-SD 43.0 37.0 - 54.0 fl     MPV 10.0 6.0 - 12.0 fL    Platelets 274 140 - 450 10*3/mm3    Neutrophil % 53.4 42.7 - 76.0 %    Lymphocyte % 38.6 19.6 - 45.3 %    Monocyte % 5.2 5.0 - 12.0 %    Eosinophil % 1.8 0.3 - 6.2 %    Basophil % 0.8 0.0 - 1.5 %    Immature Grans % 0.2 0.0 - 0.5 %    Neutrophils, Absolute 3.48 1.70 - 7.00 10*3/mm3    Lymphocytes, Absolute 2.51 0.70 - 3.10 10*3/mm3    Monocytes, Absolute 0.34 0.10 - 0.90 10*3/mm3    Eosinophils, Absolute 0.12 0.00 - 0.40 10*3/mm3    Basophils, Absolute 0.05 0.00 - 0.20 10*3/mm3    Immature Grans, Absolute 0.01 0.00 - 0.05 10*3/mm3    nRBC 0.0 0.0 - 0.2 /100 WBC   Ferritin    Collection Time: 02/10/23  3:25 PM    Specimen: Blood   Result Value Ref Range    Ferritin 125.00 13.00 - 150.00 ng/mL   D-dimer, Quantitative    Collection Time: 02/10/23  3:25 PM    Specimen: Blood   Result Value Ref Range    D-Dimer, Quantitative <0.27 0.00 - 0.50 MCGFEU/mL   Homocysteine    Collection Time: 02/10/23  3:25 PM    Specimen: Blood   Result Value Ref Range    Homocysteine, Plasma (Quant) 4.6 0.0 - 15.0 umol/L         Assessment & Plan   Diagnoses and all orders for this visit:    1. Autonomic orthostatic hypotension (Primary)  -     Ambulatory Referral to Neurology    2. Elevated fasting blood sugar    3. Slurred speech  -     Ambulatory Referral to Neurology      We reviewed the patient's course of illness and current level of functioning.  We reviewed her recent GI consult.  She plans to continue with the dietary modifications recommended through the naturopathic practitioner.  I'd like to get a neurologist's opinion and patient is referred.        Medications, including side effects, were discussed with the patient. Patient verbalized understanding.  The plan of care was discussed. All questions were answered. Patient verbalized understanding.      Return in about 3 months (around 5/16/2023).

## 2023-02-28 ENCOUNTER — OFFICE VISIT (OUTPATIENT)
Dept: NEUROLOGY | Facility: CLINIC | Age: 53
End: 2023-02-28
Payer: COMMERCIAL

## 2023-02-28 VITALS
HEIGHT: 72 IN | OXYGEN SATURATION: 97 % | BODY MASS INDEX: 27.53 KG/M2 | HEART RATE: 94 BPM | SYSTOLIC BLOOD PRESSURE: 122 MMHG | DIASTOLIC BLOOD PRESSURE: 88 MMHG

## 2023-02-28 DIAGNOSIS — G90.A POTS (POSTURAL ORTHOSTATIC TACHYCARDIA SYNDROME): ICD-10-CM

## 2023-02-28 DIAGNOSIS — R20.0 NUMBNESS AND TINGLING OF BOTH LEGS: Primary | ICD-10-CM

## 2023-02-28 DIAGNOSIS — R20.2 NUMBNESS AND TINGLING OF BOTH LEGS: Primary | ICD-10-CM

## 2023-02-28 PROCEDURE — 99205 OFFICE O/P NEW HI 60 MIN: CPT | Performed by: PSYCHIATRY & NEUROLOGY

## 2023-03-02 PROBLEM — R20.0 NUMBNESS AND TINGLING OF BOTH LEGS: Status: ACTIVE | Noted: 2023-03-02

## 2023-03-02 PROBLEM — R20.2 NUMBNESS AND TINGLING OF BOTH LEGS: Status: ACTIVE | Noted: 2023-03-02

## 2023-03-07 ENCOUNTER — PATIENT ROUNDING (BHMG ONLY) (OUTPATIENT)
Dept: NEUROLOGY | Facility: CLINIC | Age: 53
End: 2023-03-07
Payer: COMMERCIAL

## 2023-04-05 ENCOUNTER — TELEPHONE (OUTPATIENT)
Dept: INTERNAL MEDICINE | Facility: CLINIC | Age: 53
End: 2023-04-05
Payer: COMMERCIAL

## 2023-04-05 NOTE — TELEPHONE ENCOUNTER
A Josephine Aragon called to do a peer to peer with Dr. Craig but she is on vacation.  I asked if another  or ma could help she said no.  The number for Josephine Aragon is [phone]

## 2023-04-06 ENCOUNTER — TELEPHONE (OUTPATIENT)
Dept: CARDIOLOGY | Facility: CLINIC | Age: 53
End: 2023-04-06
Payer: COMMERCIAL

## 2023-04-06 NOTE — TELEPHONE ENCOUNTER
Dr. Trejo called in lvm wants to speak with Dr. Bailey regarding cardiac status on pt. Possibly a [facility] peer[phone]     Devika Avilez MA  4/6/23 8:30A

## 2023-04-11 ENCOUNTER — TRANSCRIBE ORDERS (OUTPATIENT)
Dept: ADMINISTRATIVE | Facility: HOSPITAL | Age: 53
End: 2023-04-11
Payer: COMMERCIAL

## 2023-04-11 ENCOUNTER — LAB (OUTPATIENT)
Dept: LAB | Facility: HOSPITAL | Age: 53
End: 2023-04-11
Payer: COMMERCIAL

## 2023-04-11 ENCOUNTER — TELEPHONE (OUTPATIENT)
Dept: INTERNAL MEDICINE | Facility: CLINIC | Age: 53
End: 2023-04-11
Payer: COMMERCIAL

## 2023-04-11 DIAGNOSIS — Z91.018 FOOD ALLERGY: ICD-10-CM

## 2023-04-11 DIAGNOSIS — Z91.018 FOOD ALLERGY: Primary | ICD-10-CM

## 2023-04-11 PROCEDURE — 36415 COLL VENOUS BLD VENIPUNCTURE: CPT

## 2023-04-11 NOTE — TELEPHONE ENCOUNTER
DR RIVERA CALLED REGARDING THIS PATIENT AND IS ASKING FOR A RETURN CALL FROM THE PCP. IF YOU PLEASE GIVE HER A CALL AT [phone]

## 2023-05-18 ENCOUNTER — OFFICE VISIT (OUTPATIENT)
Dept: INTERNAL MEDICINE | Facility: CLINIC | Age: 53
End: 2023-05-18
Payer: COMMERCIAL

## 2023-05-18 VITALS
DIASTOLIC BLOOD PRESSURE: 94 MMHG | HEART RATE: 82 BPM | BODY MASS INDEX: 26.41 KG/M2 | TEMPERATURE: 97.8 F | WEIGHT: 195 LBS | SYSTOLIC BLOOD PRESSURE: 124 MMHG | OXYGEN SATURATION: 98 % | HEIGHT: 72 IN

## 2023-05-18 DIAGNOSIS — G90.A POTS (POSTURAL ORTHOSTATIC TACHYCARDIA SYNDROME): Primary | ICD-10-CM

## 2023-05-18 DIAGNOSIS — E03.9 ACQUIRED HYPOTHYROIDISM: ICD-10-CM

## 2023-05-18 DIAGNOSIS — R73.03 PREDIABETES: ICD-10-CM

## 2023-05-18 PROCEDURE — 3074F SYST BP LT 130 MM HG: CPT | Performed by: FAMILY MEDICINE

## 2023-05-18 PROCEDURE — 3080F DIAST BP >= 90 MM HG: CPT | Performed by: FAMILY MEDICINE

## 2023-05-18 PROCEDURE — 99214 OFFICE O/P EST MOD 30 MIN: CPT | Performed by: FAMILY MEDICINE

## 2023-05-18 NOTE — PROGRESS NOTES
"Subjective   Nadja Shipley is a 53 y.o. female presenting today for follow up of   Chief Complaint   Patient presents with   • Follow-up     F/u on Covid vaccine injury   • Dizziness       Dizziness  Associated symptoms include fatigue and weakness.        Pt presents for 3 month f/u POTS.  She is still working with Dr. Jaramillo, with [facility], naturopathic medicine.  She continues an \"elemental diet.\"  She feels that the naltrexone is helping noticeably with the \"inflammatory cascade.\"  She has seen Dr. Bailey and [facility], Dr. Carrasquillo.  She saw Dr. Monreal GI.  She is upset that no one will keep her on budesonide for her eosinophilic esophagitis as it helps with her pain.  She has a new grandson which has been enjoyable.  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  She has seen Dr. Peña in the interim.    Patient Active Problem List   Diagnosis   • Chronic neck pain   • Rosacea   • Insomnia   • Annual physical exam   • Hot flashes   • Screening for colon cancer   • History of colon polyps   • Irritable bowel syndrome with diarrhea   • POTS (postural orthostatic tachycardia syndrome)   • Supine hypertension   • Adverse effect of COVID-19 vaccine   • Orthostatic hypertension   • Idiopathic intracranial hypertension   • Metabolic syndrome   • Allergic conjunctivitis of both eyes   • Eosinophilic esophagitis   • Numbness and tingling of both legs   • Acquired hypothyroidism   • Prediabetes       Current Outpatient Medications on File Prior to Visit   Medication Sig   • acetaZOLAMIDE (DIAMOX) 250 MG tablet Take 1 tablet by mouth 3 (Three) Times a Day.   • albuterol sulfate  (90 Base) MCG/ACT inhaler Inhale 2 puffs Every 4 (Four) Hours As Needed for Wheezing.   • " amitriptyline (ELAVIL) 25 MG tablet Take 2 tablets (50 mg) by mouth every night at bedtime. (Patient taking differently: Take 1 tablet by mouth every night at bedtime.)   • Crisaborole (Eucrisa) 2 % ointment Apply 1 application topically to the appropriate area as directed 2 (Two) Times a Day.   • dicyclomine (BENTYL) 20 MG tablet Take 1 tablet by mouth Every 6 (Six) Hours.   • famotidine (PEPCID) 40 MG tablet Take 1 tablet by mouth 2 (Two) Times a Day.   • Ketotifen Fumarate powder    • levothyroxine (SYNTHROID, LEVOTHROID) 75 MCG tablet Take 1 tablet by mouth Daily.   • losartan (COZAAR) 100 MG tablet Take 1 tablet by mouth at Bedtime.   • methocarbamol (ROBAXIN) 750 MG tablet Take 1 tablet by mouth Daily.   • naltrexone 1 mg/mL oral suspension Take 1 mL by mouth Every Night for 7 days, THEN 2 mL Every Night for 7 days, THEN 3 mL Every Night for 7 days, THEN 4 mL Every Night.   • olopatadine (PATANOL) 0.1 % ophthalmic solution Administer 1 drop to both eyes 2 (Two) Times a Day As Needed for Allergies.   • pentoxifylline (TRENtal) 400 MG CR tablet Take 1 tablet by mouth 3 (Three) Times a Day.   • pyridostigmine (MESTINON) 60 MG tablet Take 2 tablets by mouth 3 (Three) Times a Day. (Patient taking differently: Take 1 tablet by mouth 3 (Three) Times a Day.)   • traZODone (DESYREL) 50 MG tablet Take 1 tablet by mouth At Night As Needed for Sleep. (Patient taking differently: Take 1 tablet by mouth At Night As Needed for Sleep. Patient is just taking 25 mg unless she feels she needs the 50 to sleep.)   • [DISCONTINUED] Xifaxan 550 MG tablet      No current facility-administered medications on file prior to visit.          The following portions of the patient's history were reviewed and updated as appropriate: allergies, current medications, past family history, past medical history, past social history, past surgical history and problem list.    Review of Systems   Constitutional: Positive for activity change,  "appetite change and fatigue.   Eyes: Positive for blurred vision.   Cardiovascular: Positive for palpitations.   Gastrointestinal: Positive for indigestion.   Neurological: Positive for dizziness, speech difficulty, weakness, light-headedness and memory problem.   Psychiatric/Behavioral: Positive for sleep disturbance.       Objective   Vitals:    05/18/23 1322   BP: 124/94   BP Location: Right arm   Patient Position: Lying   Cuff Size: Adult   Pulse: 82   Temp: 97.8 °F (36.6 °C)   TempSrc: Infrared   SpO2: 98%   Weight: 88.5 kg (195 lb)   Height: 182.9 cm (72\")       BP Readings from Last 3 Encounters:   05/18/23 124/94   02/28/23 122/88   02/16/23 114/84        Wt Readings from Last 3 Encounters:   05/18/23 88.5 kg (195 lb)   02/16/23 91.2 kg (201 lb)   11/30/22 92.5 kg (204 lb)        Body mass index is 26.45 kg/m².  Nursing notes and vitals reviewed.    Physical Exam  Vitals and nursing note reviewed.   Constitutional:       General: She is not in acute distress.     Appearance: She is ill-appearing (somewhat).      Comments: Lying on exam table.  Using a cane today.   HENT:      Head: Normocephalic and atraumatic.      Mouth/Throat:      Mouth: Mucous membranes are moist.   Eyes:      Extraocular Movements: Extraocular movements intact.      Conjunctiva/sclera: Conjunctivae normal.   Cardiovascular:      Rate and Rhythm: Normal rate and regular rhythm.      Heart sounds: Normal heart sounds.   Pulmonary:      Effort: Pulmonary effort is normal. No respiratory distress.      Breath sounds: Normal breath sounds.   Abdominal:      General: Bowel sounds are normal. There is no distension.      Palpations: Abdomen is soft.      Tenderness: There is no abdominal tenderness. There is no guarding.   Musculoskeletal:      Cervical back: Neck supple. No rigidity.      Right lower leg: No edema.      Left lower leg: No edema.   Lymphadenopathy:      Cervical: No cervical adenopathy.   Skin:     General: Skin is warm and " dry.   Neurological:      General: No focal deficit present.      Mental Status: She is alert and oriented to person, place, and time.   Psychiatric:         Mood and Affect: Mood normal.         Behavior: Behavior normal.         No results found for this or any previous visit (from the past 672 hour(s)).      Assessment & Plan   Diagnoses and all orders for this visit:    1. POTS (postural orthostatic tachycardia syndrome) (Primary)  -     Ferritin  -     Iron Profile    2. Acquired hypothyroidism  -     CBC & Differential  -     Vitamin D,25-Hydroxy  -     TSH  -     T4, Free    3. Prediabetes  -     Comprehensive Metabolic Panel  -     Hemoglobin A1c  -     Lipid Panel With / Chol / HDL Ratio  -     Vitamin B12      We reviewed the patient's course of illness and current level of functioning.  We reviewed her recent neurology consult.  Check labs today.  F/U 3 months.        Medications, including side effects, were discussed with the patient. Patient verbalized understanding.  The plan of care was discussed. All questions were answered. Patient verbalized understanding.      Return in about 3 months (around 8/18/2023).

## 2023-05-19 LAB
25(OH)D3+25(OH)D2 SERPL-MCNC: 88.5 NG/ML (ref 30–100)
ALBUMIN SERPL-MCNC: 4.6 G/DL (ref 3.5–5.2)
ALBUMIN/GLOB SERPL: 1.7 G/DL
ALP SERPL-CCNC: 126 U/L (ref 39–117)
ALT SERPL-CCNC: 45 U/L (ref 1–33)
AST SERPL-CCNC: 42 U/L (ref 1–32)
BASOPHILS # BLD AUTO: 0.1 10*3/MM3 (ref 0–0.2)
BASOPHILS NFR BLD AUTO: 1.8 % (ref 0–1.5)
BILIRUB SERPL-MCNC: 0.5 MG/DL (ref 0–1.2)
BUN SERPL-MCNC: 11 MG/DL (ref 6–20)
BUN/CREAT SERPL: 14.7 (ref 7–25)
CALCIUM SERPL-MCNC: 10.3 MG/DL (ref 8.6–10.5)
CHLORIDE SERPL-SCNC: 102 MMOL/L (ref 98–107)
CHOLEST SERPL-MCNC: 214 MG/DL (ref 0–200)
CHOLEST/HDLC SERPL: 3.19 {RATIO}
CO2 SERPL-SCNC: 23 MMOL/L (ref 22–29)
CREAT SERPL-MCNC: 0.75 MG/DL (ref 0.57–1)
EGFRCR SERPLBLD CKD-EPI 2021: 96.5 ML/MIN/1.73
EOSINOPHIL # BLD AUTO: 0.09 10*3/MM3 (ref 0–0.4)
EOSINOPHIL NFR BLD AUTO: 1.6 % (ref 0.3–6.2)
ERYTHROCYTE [DISTWIDTH] IN BLOOD BY AUTOMATED COUNT: 12.3 % (ref 12.3–15.4)
FERRITIN SERPL-MCNC: 113 NG/ML (ref 13–150)
GLOBULIN SER CALC-MCNC: 2.7 GM/DL
GLUCOSE SERPL-MCNC: 105 MG/DL (ref 65–99)
HBA1C MFR BLD: 5.6 % (ref 4.8–5.6)
HCT VFR BLD AUTO: 46.4 % (ref 34–46.6)
HDLC SERPL-MCNC: 67 MG/DL (ref 40–60)
HGB BLD-MCNC: 15.6 G/DL (ref 12–15.9)
IMM GRANULOCYTES # BLD AUTO: 0.03 10*3/MM3 (ref 0–0.05)
IMM GRANULOCYTES NFR BLD AUTO: 0.5 % (ref 0–0.5)
IRON SATN MFR SERPL: 20 % (ref 20–50)
IRON SERPL-MCNC: 92 MCG/DL (ref 37–145)
LDLC SERPL CALC-MCNC: 131 MG/DL (ref 0–100)
LYMPHOCYTES # BLD AUTO: 2.17 10*3/MM3 (ref 0.7–3.1)
LYMPHOCYTES NFR BLD AUTO: 38.8 % (ref 19.6–45.3)
MCH RBC QN AUTO: 30.9 PG (ref 26.6–33)
MCHC RBC AUTO-ENTMCNC: 33.6 G/DL (ref 31.5–35.7)
MCV RBC AUTO: 91.9 FL (ref 79–97)
MONOCYTES # BLD AUTO: 0.35 10*3/MM3 (ref 0.1–0.9)
MONOCYTES NFR BLD AUTO: 6.3 % (ref 5–12)
NEUTROPHILS # BLD AUTO: 2.85 10*3/MM3 (ref 1.7–7)
NEUTROPHILS NFR BLD AUTO: 51 % (ref 42.7–76)
NRBC BLD AUTO-RTO: 0 /100 WBC (ref 0–0.2)
PLATELET # BLD AUTO: 266 10*3/MM3 (ref 140–450)
POTASSIUM SERPL-SCNC: 4.5 MMOL/L (ref 3.5–5.2)
PROT SERPL-MCNC: 7.3 G/DL (ref 6–8.5)
RBC # BLD AUTO: 5.05 10*6/MM3 (ref 3.77–5.28)
SODIUM SERPL-SCNC: 139 MMOL/L (ref 136–145)
T4 FREE SERPL-MCNC: 1.64 NG/DL (ref 0.93–1.7)
TIBC SERPL-MCNC: 463 MCG/DL
TRIGL SERPL-MCNC: 89 MG/DL (ref 0–150)
TSH SERPL DL<=0.005 MIU/L-ACNC: 1.95 UIU/ML (ref 0.27–4.2)
UIBC SERPL-MCNC: 371 MCG/DL (ref 112–346)
VIT B12 SERPL-MCNC: 1500 PG/ML (ref 211–946)
VLDLC SERPL CALC-MCNC: 16 MG/DL (ref 5–40)
WBC # BLD AUTO: 5.59 10*3/MM3 (ref 3.4–10.8)

## 2023-05-24 DIAGNOSIS — I51.89 DIASTOLIC DYSFUNCTION: ICD-10-CM

## 2023-05-24 DIAGNOSIS — G90.A POTS (POSTURAL ORTHOSTATIC TACHYCARDIA SYNDROME): Primary | ICD-10-CM

## 2023-06-01 RX ORDER — CRISABOROLE 20 MG/G
1 OINTMENT TOPICAL 2 TIMES DAILY
Qty: 60 G | Refills: 2 | Status: SHIPPED | OUTPATIENT
Start: 2023-06-01

## 2023-06-02 ENCOUNTER — TELEPHONE (OUTPATIENT)
Dept: INTERNAL MEDICINE | Facility: CLINIC | Age: 53
End: 2023-06-02

## 2023-06-02 NOTE — TELEPHONE ENCOUNTER
Caller: Nadja Shipley    Relationship: Self    Best call back number: [phone]    What is the best time to reach you: ANYTIME    Who are you requesting to speak with (clinical staff, provider,  specific staff member): CLINICAL POOL    What was the call regarding: PATIENT IS CALLING IN TO SEE IF DR GENTILE HAS HAD A CHANCE TO SIGN OFF ON THE DISABILITY PAPERWORK THAT THE PATIENT DROPPED OFF. SHE WOULD LIKE TO COME AND PICK THAT UP IF IT IS FINISHED.     PLEASE CALL TO ADVISE.     Is it okay if the provider responds through [employer]hart: YES THAT WOULD BE FINE.

## 2023-06-02 NOTE — TELEPHONE ENCOUNTER
Rx Refill Note  Requested Prescriptions     Pending Prescriptions Disp Refills    albuterol sulfate HFA (Ventolin HFA) 108 (90 Base) MCG/ACT inhaler 18 g 1     Sig: Inhale 2 puffs Every 4 (Four) Hours As Needed for Wheezing.      Last office visit with prescribing clinician: 5/18/2023   Last telemedicine visit with prescribing clinician: Visit date not found   Next office visit with prescribing clinician: 6/2/2023                         Would you like a call back once the refill request has been completed: [] Yes [] No    If the office needs to give you a call back, can they leave a voicemail: [] Yes [] No    Nazia Mckeon, PCT  06/02/23, 14:10 EDT

## 2023-06-04 RX ORDER — ALBUTEROL SULFATE 90 UG/1
2 AEROSOL, METERED RESPIRATORY (INHALATION) EVERY 4 HOURS PRN
Qty: 18 G | Refills: 1 | Status: SHIPPED | OUTPATIENT
Start: 2023-06-04

## 2023-06-16 ENCOUNTER — TELEPHONE (OUTPATIENT)
Dept: INTERNAL MEDICINE | Facility: CLINIC | Age: 53
End: 2023-06-16
Payer: COMMERCIAL

## 2023-07-25 RX ORDER — FAMOTIDINE 40 MG/1
40 TABLET, FILM COATED ORAL 2 TIMES DAILY
Qty: 180 TABLET | Refills: 3 | Status: SHIPPED | OUTPATIENT
Start: 2023-07-25

## 2023-07-25 RX ORDER — ALBUTEROL SULFATE 90 UG/1
2 AEROSOL, METERED RESPIRATORY (INHALATION) EVERY 4 HOURS PRN
Qty: 18 G | Refills: 1 | Status: SHIPPED | OUTPATIENT
Start: 2023-07-25

## 2023-08-24 ENCOUNTER — OFFICE VISIT (OUTPATIENT)
Dept: INTERNAL MEDICINE | Facility: CLINIC | Age: 53
End: 2023-08-24
Payer: COMMERCIAL

## 2023-08-24 VITALS
SYSTOLIC BLOOD PRESSURE: 126 MMHG | HEART RATE: 77 BPM | HEIGHT: 72 IN | BODY MASS INDEX: 26.57 KG/M2 | DIASTOLIC BLOOD PRESSURE: 88 MMHG | OXYGEN SATURATION: 99 % | TEMPERATURE: 97.8 F

## 2023-08-24 DIAGNOSIS — N30.10 INTERSTITIAL CYSTITIS: ICD-10-CM

## 2023-08-24 DIAGNOSIS — G90.A POTS (POSTURAL ORTHOSTATIC TACHYCARDIA SYNDROME): Primary | ICD-10-CM

## 2023-08-24 DIAGNOSIS — R53.82 CHRONIC FATIGUE: ICD-10-CM

## 2023-08-24 PROCEDURE — 3079F DIAST BP 80-89 MM HG: CPT | Performed by: FAMILY MEDICINE

## 2023-08-24 PROCEDURE — 3074F SYST BP LT 130 MM HG: CPT | Performed by: FAMILY MEDICINE

## 2023-08-24 PROCEDURE — 99214 OFFICE O/P EST MOD 30 MIN: CPT | Performed by: FAMILY MEDICINE

## 2023-08-24 PROCEDURE — 1159F MED LIST DOCD IN RCRD: CPT | Performed by: FAMILY MEDICINE

## 2023-08-24 PROCEDURE — 1160F RVW MEDS BY RX/DR IN RCRD: CPT | Performed by: FAMILY MEDICINE

## 2023-08-24 NOTE — PROGRESS NOTES
Subjective   Nadja Shipley is a 53 y.o. female presenting today for follow up of   Chief Complaint   Patient presents with    POTS (postural orthostatic tachycardia syndrome)     3 month follow up        Dizziness  Associated symptoms include fatigue and weakness.      Pt presents for 3 month f/u POTS.  She is still working with Dr. Jaramillo, with [facility], naturopathic medicine.  She says she has been treated for abnormal microbiome and yeast testing.  She requests urine GC and viral/lyme testing.  She has come off some of her medications.  She has seen Dr. Bailey and [facility], Dr. Carrasquillo.  She saw CHHAYA Agrawal.  She has a new grandson which has been enjoyable.  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  She has seen Dr. Peña in the interim.    Patient Active Problem List   Diagnosis    Chronic neck pain    Rosacea    Insomnia    Annual physical exam    Hot flashes    Screening for colon cancer    History of colon polyps    Irritable bowel syndrome with diarrhea    POTS (postural orthostatic tachycardia syndrome)    Supine hypertension    Adverse effect of COVID-19 vaccine    Orthostatic hypertension    Idiopathic intracranial hypertension    Metabolic syndrome    Allergic conjunctivitis of both eyes    Eosinophilic esophagitis    Numbness and tingling of both legs    Acquired hypothyroidism    Prediabetes       Current Outpatient Medications on File Prior to Visit   Medication Sig    acetaZOLAMIDE (DIAMOX) 250 MG tablet Take 1 tablet by mouth 3 (Three) Times a Day.    albuterol sulfate HFA (Ventolin HFA) 108 (90 Base) MCG/ACT inhaler Inhale 2 puffs into the lungs Every 4 (Four) Hours As Needed for Wheezing.    Crisaborole (Eucrisa) 2 % ointment Apply 1 application  topically to the appropriate area as directed 2 (Two) Times a Day.    dicyclomine (BENTYL) 20 MG tablet Take 1 tablet by mouth Every 6 (Six) Hours.    famotidine (PEPCID) 40 MG tablet Take 1 tablet by mouth 2 (Two) Times a Day.    levothyroxine (SYNTHROID, LEVOTHROID) 75 MCG tablet Take 1 tablet by mouth Daily.    methocarbamol (ROBAXIN) 750 MG tablet Take 1 tablet by mouth Daily.    naltrexone 1 mg/mL oral suspension Take 1 mL by mouth Every Night for 7 days, THEN 2 mL Every Night for 7 days, THEN 3 mL Every Night for 7 days, THEN 4 mL Every Night.    olopatadine (PATANOL) 0.1 % ophthalmic solution Administer 1 drop to both eyes 2 (Two) Times a Day As Needed for Allergies.    traZODone (DESYREL) 50 MG tablet Take 1 tablet by mouth At Night As Needed for Sleep. (Patient taking differently: Take 1 tablet by mouth At Night As Needed for Sleep. Patient is just taking 25 mg unless she feels she needs the 50 to sleep.)    [DISCONTINUED] amitriptyline (ELAVIL) 25 MG tablet Take 2 tablets (50 mg) by mouth every night at bedtime. (Patient taking differently: Take 1 tablet by mouth every night at bedtime.)    [DISCONTINUED] Ketotifen Fumarate powder     [DISCONTINUED] losartan (COZAAR) 100 MG tablet Take 1 tablet by mouth at Bedtime.    [DISCONTINUED] pentoxifylline (TRENtal) 400 MG CR tablet Take 1 tablet by mouth 3 (Three) Times a Day.    [DISCONTINUED] pyridostigmine (MESTINON) 60 MG tablet Take 2 tablets by mouth 3 (Three) Times a Day. (Patient taking differently: Take 1 tablet by mouth 3 (Three) Times a Day.)     No current facility-administered medications on file prior to visit.          The following portions of the patient's history were reviewed and updated as appropriate: allergies, current medications, past family history, past medical history, past social history, past surgical history and problem list.    Review of Systems   Constitutional:  Positive for activity change, appetite change and fatigue.   Eyes:   "Positive for blurred vision.   Cardiovascular:  Positive for palpitations.   Gastrointestinal:  Positive for indigestion.   Neurological:  Positive for dizziness, speech difficulty, weakness, light-headedness and memory problem.   Psychiatric/Behavioral:  Positive for sleep disturbance.      Objective   Vitals:    08/24/23 1349   BP: 126/88   BP Location: Right arm   Patient Position: Sitting   Cuff Size: Adult   Pulse: 77   Temp: 97.8 øF (36.6 øC)   TempSrc: Infrared   SpO2: 99%   Height: 182 cm (71.65\")       BP Readings from Last 3 Encounters:   08/24/23 126/88   07/05/23 147/93   05/18/23 124/94        Wt Readings from Last 3 Encounters:   07/05/23 88 kg (194 lb 0.1 oz)   05/18/23 88.5 kg (195 lb)   02/16/23 91.2 kg (201 lb)        Body mass index is 26.57 kg/mý.  Nursing notes and vitals reviewed.    Physical Exam  Vitals and nursing note reviewed.   Constitutional:       General: She is not in acute distress.     Appearance: She is ill-appearing (somewhat).      Comments: Lying on exam table.  Using a cane today.   HENT:      Head: Normocephalic and atraumatic.      Mouth/Throat:      Mouth: Mucous membranes are moist.   Eyes:      Extraocular Movements: Extraocular movements intact.      Conjunctiva/sclera: Conjunctivae normal.   Cardiovascular:      Rate and Rhythm: Normal rate and regular rhythm.      Heart sounds: Normal heart sounds.   Pulmonary:      Effort: Pulmonary effort is normal. No respiratory distress.      Breath sounds: Normal breath sounds.   Abdominal:      General: Bowel sounds are normal. There is no distension.      Palpations: Abdomen is soft.      Tenderness: There is no abdominal tenderness. There is no guarding.   Musculoskeletal:      Cervical back: Neck supple. No rigidity.      Right lower leg: No edema.      Left lower leg: No edema.   Lymphadenopathy:      Cervical: No cervical adenopathy.   Skin:     General: Skin is warm and dry.   Neurological:      General: No focal deficit " present.      Mental Status: She is alert and oriented to person, place, and time.   Psychiatric:         Mood and Affect: Mood normal.         Behavior: Behavior normal.       No results found for this or any previous visit (from the past 672 hour(s)).      Assessment & Plan   Diagnoses and all orders for this visit:    1. POTS (postural orthostatic tachycardia syndrome) (Primary)    2. Interstitial cystitis  -     Chlamydia trachomatis, Neisseria gonorrhoeae, PCR - , Urine, Clean Catch    3. Chronic fatigue  -     Lyme Disease Total Antibody With Reflex to Immunoassay  -     CMV IgG IgM  -     EBV Antibody Profile  -     CBC & Differential  -     Comprehensive Metabolic Panel  -     TSH  -     T4, Free      We reviewed the patient's course of illness and current level of functioning.  We reviewed her lastest treatments per Dr. Jaramillo.  Check labs today.  F/U 4 months.        Medications, including side effects, were discussed with the patient. Patient verbalized understanding.  The plan of care was discussed. All questions were answered. Patient verbalized understanding.      Return in about 4 months (around 12/24/2023).

## 2023-08-25 LAB
ALBUMIN SERPL-MCNC: 4.3 G/DL (ref 3.8–4.9)
ALBUMIN/GLOB SERPL: 1.5 {RATIO} (ref 1.2–2.2)
ALP SERPL-CCNC: 148 IU/L (ref 44–121)
ALT SERPL-CCNC: 21 IU/L (ref 0–32)
AST SERPL-CCNC: 21 IU/L (ref 0–40)
B BURGDOR IGG+IGM SER QL IA: NEGATIVE
BASOPHILS # BLD AUTO: 0.1 X10E3/UL (ref 0–0.2)
BASOPHILS NFR BLD AUTO: 1 %
BILIRUB SERPL-MCNC: 0.3 MG/DL (ref 0–1.2)
BUN SERPL-MCNC: 10 MG/DL (ref 6–24)
BUN/CREAT SERPL: 13 (ref 9–23)
CALCIUM SERPL-MCNC: 9.3 MG/DL (ref 8.7–10.2)
CHLORIDE SERPL-SCNC: 104 MMOL/L (ref 96–106)
CMV IGG SERPL IA-ACNC: <0.6 U/ML (ref 0–0.59)
CMV IGM SERPL IA-ACNC: <30 AU/ML (ref 0–29.9)
CO2 SERPL-SCNC: 23 MMOL/L (ref 20–29)
CREAT SERPL-MCNC: 0.77 MG/DL (ref 0.57–1)
EBV NA IGG SER IA-ACNC: <18 U/ML (ref 0–17.9)
EBV VCA IGG SER IA-ACNC: <18 U/ML (ref 0–17.9)
EBV VCA IGM SER IA-ACNC: <36 U/ML (ref 0–35.9)
EGFRCR SERPLBLD CKD-EPI 2021: 93 ML/MIN/1.73
EOSINOPHIL # BLD AUTO: 0.2 X10E3/UL (ref 0–0.4)
EOSINOPHIL NFR BLD AUTO: 2 %
ERYTHROCYTE [DISTWIDTH] IN BLOOD BY AUTOMATED COUNT: 12.8 % (ref 11.7–15.4)
GLOBULIN SER CALC-MCNC: 2.8 G/DL (ref 1.5–4.5)
GLUCOSE SERPL-MCNC: 107 MG/DL (ref 70–99)
HCT VFR BLD AUTO: 44.1 % (ref 34–46.6)
HGB BLD-MCNC: 14.8 G/DL (ref 11.1–15.9)
IMM GRANULOCYTES # BLD AUTO: 0 X10E3/UL (ref 0–0.1)
IMM GRANULOCYTES NFR BLD AUTO: 1 %
LYMPHOCYTES # BLD AUTO: 2.5 X10E3/UL (ref 0.7–3.1)
LYMPHOCYTES NFR BLD AUTO: 29 %
MCH RBC QN AUTO: 30.1 PG (ref 26.6–33)
MCHC RBC AUTO-ENTMCNC: 33.6 G/DL (ref 31.5–35.7)
MCV RBC AUTO: 90 FL (ref 79–97)
MONOCYTES # BLD AUTO: 0.5 X10E3/UL (ref 0.1–0.9)
MONOCYTES NFR BLD AUTO: 6 %
NEUTROPHILS # BLD AUTO: 5.2 X10E3/UL (ref 1.4–7)
NEUTROPHILS NFR BLD AUTO: 61 %
PLATELET # BLD AUTO: 400 X10E3/UL (ref 150–450)
POTASSIUM SERPL-SCNC: 4.4 MMOL/L (ref 3.5–5.2)
PROT SERPL-MCNC: 7.1 G/DL (ref 6–8.5)
RBC # BLD AUTO: 4.92 X10E6/UL (ref 3.77–5.28)
SERVICE CMNT-IMP: NORMAL
SODIUM SERPL-SCNC: 141 MMOL/L (ref 134–144)
T4 FREE SERPL-MCNC: 1.54 NG/DL (ref 0.82–1.77)
TSH SERPL DL<=0.005 MIU/L-ACNC: 3.18 UIU/ML (ref 0.45–4.5)
WBC # BLD AUTO: 8.4 X10E3/UL (ref 3.4–10.8)

## 2023-08-26 LAB
C TRACH RRNA SPEC QL NAA+PROBE: NEGATIVE
N GONORRHOEA RRNA SPEC QL NAA+PROBE: NEGATIVE

## 2023-10-13 ENCOUNTER — TELEPHONE (OUTPATIENT)
Dept: INTERNAL MEDICINE | Facility: CLINIC | Age: 53
End: 2023-10-13
Payer: COMMERCIAL

## 2023-10-13 NOTE — TELEPHONE ENCOUNTER
Amanda from [facility] pharmacy calling wanting clarification on whether the naltrexone is supposed to be taken 3 times a day. Please advise.

## 2023-10-18 RX ORDER — ALBUTEROL SULFATE 90 UG/1
2 AEROSOL, METERED RESPIRATORY (INHALATION) EVERY 4 HOURS PRN
Qty: 18 G | Refills: 1 | Status: SHIPPED | OUTPATIENT
Start: 2023-10-18

## 2023-10-18 RX ORDER — ACETAZOLAMIDE 250 MG/1
250 TABLET ORAL 3 TIMES DAILY
Qty: 270 TABLET | Refills: 3 | Status: SHIPPED | OUTPATIENT
Start: 2023-10-18

## 2023-10-26 ENCOUNTER — TELEPHONE (OUTPATIENT)
Dept: INTERNAL MEDICINE | Facility: CLINIC | Age: 53
End: 2023-10-26
Payer: COMMERCIAL

## 2023-11-20 RX ORDER — CRISABOROLE 20 MG/G
1 OINTMENT TOPICAL 2 TIMES DAILY
Qty: 60 G | Refills: 2 | Status: SHIPPED | OUTPATIENT
Start: 2023-11-20

## 2023-11-27 RX ORDER — OLOPATADINE HYDROCHLORIDE 1 MG/ML
1 SOLUTION/ DROPS OPHTHALMIC 2 TIMES DAILY PRN
Qty: 5 ML | Refills: 11 | Status: SHIPPED | OUTPATIENT
Start: 2023-11-27

## 2023-12-05 DIAGNOSIS — E03.9 HYPOTHYROIDISM, UNSPECIFIED TYPE: ICD-10-CM

## 2023-12-06 RX ORDER — LEVOTHYROXINE SODIUM 0.07 MG/1
75 TABLET ORAL DAILY
Qty: 90 TABLET | Refills: 3 | Status: SHIPPED | OUTPATIENT
Start: 2023-12-06

## 2023-12-07 ENCOUNTER — OFFICE VISIT (OUTPATIENT)
Dept: INTERNAL MEDICINE | Facility: CLINIC | Age: 53
End: 2023-12-07
Payer: COMMERCIAL

## 2023-12-07 VITALS
TEMPERATURE: 98 F | BODY MASS INDEX: 28.53 KG/M2 | DIASTOLIC BLOOD PRESSURE: 78 MMHG | HEIGHT: 72 IN | HEART RATE: 77 BPM | WEIGHT: 210.6 LBS | SYSTOLIC BLOOD PRESSURE: 120 MMHG | OXYGEN SATURATION: 97 %

## 2023-12-07 DIAGNOSIS — G90.A POTS (POSTURAL ORTHOSTATIC TACHYCARDIA SYNDROME): ICD-10-CM

## 2023-12-07 DIAGNOSIS — E03.9 HYPOTHYROIDISM, UNSPECIFIED TYPE: ICD-10-CM

## 2023-12-07 DIAGNOSIS — Z00.00 ANNUAL PHYSICAL EXAM: Primary | ICD-10-CM

## 2023-12-07 PROCEDURE — 3078F DIAST BP <80 MM HG: CPT | Performed by: FAMILY MEDICINE

## 2023-12-07 PROCEDURE — 1160F RVW MEDS BY RX/DR IN RCRD: CPT | Performed by: FAMILY MEDICINE

## 2023-12-07 PROCEDURE — 1159F MED LIST DOCD IN RCRD: CPT | Performed by: FAMILY MEDICINE

## 2023-12-07 PROCEDURE — 3074F SYST BP LT 130 MM HG: CPT | Performed by: FAMILY MEDICINE

## 2023-12-07 PROCEDURE — 99396 PREV VISIT EST AGE 40-64: CPT | Performed by: FAMILY MEDICINE

## 2023-12-07 NOTE — PROGRESS NOTES
"Chief Complaint   Patient presents with    Annual Exam     Also F/U on disability, brain fog, fatigue.       Patient Name: Nadja Shipley    SUBJECTIVE    Nadja is a 53 y.o. female presenting for Annual Exam (Also F/U on disability, brain fog, fatigue.)      Well Adult Physical   Patient here for a comprehensive physical exam.The patient reports problems - POTS.  Still working with Dr. Jaramillo, naturopathic provider.  She says she has gone from \"4% functional to 30% functional.\"      Do you take any herbs or supplements that were not prescribed by a doctor? yes Multiple per Dr. Jaramillo.  Are you taking calcium supplements? no   Are you taking aspirin daily? no     History:  Any STD's in the past? none  Nadja Shipley 53 y.o. female who presents for an Annual Wellness Visit.  she has a history of   Patient Active Problem List   Diagnosis    Chronic neck pain    Rosacea    Insomnia    Annual physical exam    Hot flashes    Screening for colon cancer    History of colon polyps    Irritable bowel syndrome with diarrhea    POTS (postural orthostatic tachycardia syndrome)    Supine hypertension    Adverse effect of COVID-19 vaccine    Orthostatic hypertension    Idiopathic intracranial hypertension    Metabolic syndrome    Allergic conjunctivitis of both eyes    Eosinophilic esophagitis    Numbness and tingling of both legs    Acquired hypothyroidism    Prediabetes   .  she has been doing well with new interval problems.      Health Habits:  Dental Exam. not up to date - will schedule  Eye Exam. not up to date - will schedule  Exercise: 4 times/week.  Current exercise activities include: walking      The following portions of the patient's history were reviewed and updated as appropriate: allergies, current medications, past family history, past medical history, past social history, past surgical history and problem list.    Review of Systems   Constitutional:  Positive for fatigue.   Respiratory: Negative.     Cardiovascular: " "Negative.    Neurological:  Positive for dizziness and light-headedness.       Lanolin      Current Outpatient Medications:     acetaZOLAMIDE (DIAMOX) 250 MG tablet, Take 1 tablet by mouth 3 (Three) Times a Day., Disp: 270 tablet, Rfl: 3    albuterol sulfate HFA (Ventolin HFA) 108 (90 Base) MCG/ACT inhaler, Inhale 2 puffs into the lungs Every 4 (Four) Hours As Needed for Wheezing., Disp: 18 g, Rfl: 1    Crisaborole (Eucrisa) 2 % ointment, Apply 1 application topically to the appropriate area as directed 2 (Two) Times a Day., Disp: 60 g, Rfl: 2    dicyclomine (BENTYL) 20 MG tablet, Take 1 tablet by mouth Every 6 (Six) Hours., Disp: 120 tablet, Rfl: 5    famotidine (PEPCID) 40 MG tablet, Take 1 tablet by mouth 2 (Two) Times a Day., Disp: 180 tablet, Rfl: 3    levothyroxine (SYNTHROID, LEVOTHROID) 75 MCG tablet, Take 1 tablet by mouth Daily., Disp: 90 tablet, Rfl: 3    methocarbamol (ROBAXIN) 750 MG tablet, Take 1 tablet by mouth Daily., Disp: 90 tablet, Rfl: 3    Naltrexone HCl, Pain, 4.5 MG capsule, Take 4.5 mg by mouth Every Night., Disp: 90 capsule, Rfl: 1    olopatadine (PATANOL) 0.1 % ophthalmic solution, Administer 1 drop to both eyes 2 (Two) Times a Day As Needed for Allergies., Disp: 5 mL, Rfl: 11    traZODone (DESYREL) 50 MG tablet, Take 1 tablet by mouth At Night As Needed for Sleep. (Patient taking differently: Take 1 tablet by mouth At Night As Needed for Sleep. Patient is just taking 25 mg unless she feels she needs the 50 to sleep.), Disp: 90 tablet, Rfl: 1    OBJECTIVE    /78 (BP Location: Right arm, Patient Position: Sitting, Cuff Size: Large Adult)   Pulse 77   Temp 98 °F (36.7 °C) (Infrared)   Ht 182 cm (71.65\")   Wt 95.5 kg (210 lb 9.6 oz)   SpO2 97%   BMI 28.84 kg/m²     Physical Exam  Vitals and nursing note reviewed.   Constitutional:       Appearance: Normal appearance. She is well-developed.   HENT:      Head: Normocephalic and atraumatic.      Right Ear: External ear normal.      " Left Ear: External ear normal.      Nose: Nose normal.      Mouth/Throat:      Mouth: Mucous membranes are moist.      Pharynx: Oropharynx is clear.   Eyes:      General: No scleral icterus.        Right eye: No discharge.         Left eye: No discharge.      Extraocular Movements: Extraocular movements intact.      Conjunctiva/sclera: Conjunctivae normal.      Pupils: Pupils are equal, round, and reactive to light.   Neck:      Thyroid: No thyromegaly.   Cardiovascular:      Rate and Rhythm: Normal rate and regular rhythm.      Heart sounds: Normal heart sounds. No murmur heard.     No friction rub. No gallop.   Pulmonary:      Effort: Pulmonary effort is normal. No respiratory distress.      Breath sounds: Normal breath sounds. No wheezing or rales.   Abdominal:      General: Bowel sounds are normal. There is no distension.      Palpations: Abdomen is soft. There is no mass.      Tenderness: There is no abdominal tenderness.      Hernia: No hernia is present.   Musculoskeletal:         General: No deformity.      Cervical back: Neck supple. No rigidity.      Right lower leg: No edema.      Left lower leg: No edema.   Lymphadenopathy:      Cervical: No cervical adenopathy.   Skin:     General: Skin is warm and dry.      Findings: No rash.   Neurological:      General: No focal deficit present.      Mental Status: She is alert and oriented to person, place, and time.      Cranial Nerves: No cranial nerve deficit.      Motor: No abnormal muscle tone.      Coordination: Coordination normal.      Deep Tendon Reflexes: Reflexes are normal and symmetric. Reflexes normal.   Psychiatric:         Mood and Affect: Mood normal.         Behavior: Behavior normal.         Thought Content: Thought content normal.         Judgment: Judgment normal.         Common labs          2/10/2023    15:25 5/18/2023    14:16 8/24/2023    14:50   Common Labs   Glucose 109  105  107    BUN 16  11  10    Creatinine 0.91  0.75  0.77    Sodium  138  139  141    Potassium 3.8  4.5  4.4    Chloride 106  102  104    Calcium 9.9  10.3  9.3    Total Protein  7.3  7.1    Albumin 4.3  4.6  4.3    Total Bilirubin 0.3  0.5  0.3    Alkaline Phosphatase 106  126  148    AST (SGOT) 16  42  21    ALT (SGPT) 21  45  21    WBC 6.51  5.59  8.4    Hemoglobin 14.4  15.6  14.8    Hematocrit 43.1  46.4  44.1    Platelets 274  266  400    Total Cholesterol 219      Total Cholesterol  214     Triglycerides 115  89     HDL Cholesterol 56  67     LDL Cholesterol  143  131     Hemoglobin A1C 6.20  5.60          ASSESSMENT AND PLAN  Diagnoses and all orders for this visit:    1. Annual physical exam (Primary)  -     Mammo screening digital tomosynthesis bilateral w CAD; Future    2. POTS (postural orthostatic tachycardia syndrome)  -     Comprehensive Metabolic Panel  -     Hemoglobin A1c  -     Vitamin B12  -     Vitamin D,25-Hydroxy  -     Ferritin    3. Hypothyroidism, unspecified type  -     CBC & Differential  -     Lipid Panel With / Chol / HDL Ratio  -     TSH  -     T4, Free      Mammogram due and is ordered.  Labs today.  Pap smear next year.  Colonoscopy UTD 2021.    Discussed healthy diet, exercise, cancer screening, immunizations, and preventive care.  Hm tab updated.  Encouraged seat belt use.  No texting while driving. Orders placed as below.     Encouraged covid vaccination if not already done.  Covid vaccination can be scheduled at [email or web address]    continue current medications and return for routine annual checkups      Return in about 6 months (around 6/7/2024).

## 2023-12-08 LAB
25(OH)D3+25(OH)D2 SERPL-MCNC: 65.2 NG/ML (ref 30–100)
ALBUMIN SERPL-MCNC: 4.7 G/DL (ref 3.5–5.2)
ALBUMIN/GLOB SERPL: 2 G/DL
ALP SERPL-CCNC: 114 U/L (ref 39–117)
ALT SERPL-CCNC: 25 U/L (ref 1–33)
AST SERPL-CCNC: 24 U/L (ref 1–32)
BASOPHILS # BLD AUTO: 0.05 10*3/MM3 (ref 0–0.2)
BASOPHILS NFR BLD AUTO: 1.2 % (ref 0–1.5)
BILIRUB SERPL-MCNC: 0.4 MG/DL (ref 0–1.2)
BUN SERPL-MCNC: 11 MG/DL (ref 6–20)
BUN/CREAT SERPL: 13.6 (ref 7–25)
CALCIUM SERPL-MCNC: 9.9 MG/DL (ref 8.6–10.5)
CHLORIDE SERPL-SCNC: 103 MMOL/L (ref 98–107)
CHOLEST SERPL-MCNC: 244 MG/DL (ref 0–200)
CHOLEST/HDLC SERPL: 3.39 {RATIO}
CO2 SERPL-SCNC: 25.2 MMOL/L (ref 22–29)
CREAT SERPL-MCNC: 0.81 MG/DL (ref 0.57–1)
EGFRCR SERPLBLD CKD-EPI 2021: 88 ML/MIN/1.73
EOSINOPHIL # BLD AUTO: 0.14 10*3/MM3 (ref 0–0.4)
EOSINOPHIL NFR BLD AUTO: 3.4 % (ref 0.3–6.2)
ERYTHROCYTE [DISTWIDTH] IN BLOOD BY AUTOMATED COUNT: 12.8 % (ref 12.3–15.4)
FERRITIN SERPL-MCNC: 117 NG/ML (ref 13–150)
GLOBULIN SER CALC-MCNC: 2.3 GM/DL
GLUCOSE SERPL-MCNC: 109 MG/DL (ref 65–99)
HBA1C MFR BLD: 5.6 % (ref 4.8–5.6)
HCT VFR BLD AUTO: 45.8 % (ref 34–46.6)
HDLC SERPL-MCNC: 72 MG/DL (ref 40–60)
HGB BLD-MCNC: 15.1 G/DL (ref 12–15.9)
IMM GRANULOCYTES # BLD AUTO: 0.01 10*3/MM3 (ref 0–0.05)
IMM GRANULOCYTES NFR BLD AUTO: 0.2 % (ref 0–0.5)
LDLC SERPL CALC-MCNC: 157 MG/DL (ref 0–100)
LYMPHOCYTES # BLD AUTO: 1.89 10*3/MM3 (ref 0.7–3.1)
LYMPHOCYTES NFR BLD AUTO: 45.7 % (ref 19.6–45.3)
MCH RBC QN AUTO: 30.9 PG (ref 26.6–33)
MCHC RBC AUTO-ENTMCNC: 33 G/DL (ref 31.5–35.7)
MCV RBC AUTO: 93.9 FL (ref 79–97)
MONOCYTES # BLD AUTO: 0.26 10*3/MM3 (ref 0.1–0.9)
MONOCYTES NFR BLD AUTO: 6.3 % (ref 5–12)
NEUTROPHILS # BLD AUTO: 1.79 10*3/MM3 (ref 1.7–7)
NEUTROPHILS NFR BLD AUTO: 43.2 % (ref 42.7–76)
NRBC BLD AUTO-RTO: 0 /100 WBC (ref 0–0.2)
PLATELET # BLD AUTO: 280 10*3/MM3 (ref 140–450)
POTASSIUM SERPL-SCNC: 4.3 MMOL/L (ref 3.5–5.2)
PROT SERPL-MCNC: 7 G/DL (ref 6–8.5)
RBC # BLD AUTO: 4.88 10*6/MM3 (ref 3.77–5.28)
SODIUM SERPL-SCNC: 139 MMOL/L (ref 136–145)
T4 FREE SERPL-MCNC: 1.68 NG/DL (ref 0.93–1.7)
TRIGL SERPL-MCNC: 88 MG/DL (ref 0–150)
TSH SERPL DL<=0.005 MIU/L-ACNC: 2.48 UIU/ML (ref 0.27–4.2)
VIT B12 SERPL-MCNC: 1501 PG/ML (ref 211–946)
VLDLC SERPL CALC-MCNC: 15 MG/DL (ref 5–40)
WBC # BLD AUTO: 4.14 10*3/MM3 (ref 3.4–10.8)

## 2023-12-15 DIAGNOSIS — Z86.39 HISTORY OF IRON DEFICIENCY: Primary | ICD-10-CM

## 2024-01-05 ENCOUNTER — HOSPITAL ENCOUNTER (EMERGENCY)
Facility: HOSPITAL | Age: 54
Discharge: HOME | End: 2024-01-05
Attending: EMERGENCY MEDICINE
Payer: COMMERCIAL

## 2024-01-05 ENCOUNTER — APPOINTMENT (OUTPATIENT)
Dept: CT IMAGING | Facility: HOSPITAL | Age: 54
End: 2024-01-05
Payer: COMMERCIAL

## 2024-01-05 VITALS
WEIGHT: 210 LBS | BODY MASS INDEX: 29.4 KG/M2 | OXYGEN SATURATION: 98 % | HEART RATE: 75 BPM | HEIGHT: 71 IN | DIASTOLIC BLOOD PRESSURE: 97 MMHG | TEMPERATURE: 98.1 F | RESPIRATION RATE: 18 BRPM | SYSTOLIC BLOOD PRESSURE: 154 MMHG

## 2024-01-05 DIAGNOSIS — R06.02 SHORTNESS OF BREATH: Primary | ICD-10-CM

## 2024-01-05 LAB
ALBUMIN SERPL-MCNC: 4.6 G/DL (ref 3.5–5.2)
ALBUMIN/GLOB SERPL: 1.7 G/DL
ALP SERPL-CCNC: 140 U/L (ref 39–117)
ALT SERPL W P-5'-P-CCNC: 38 U/L (ref 1–33)
ANION GAP SERPL CALCULATED.3IONS-SCNC: 8 MMOL/L (ref 5–15)
APTT PPP: 33.5 SECONDS (ref 24.3–38.1)
AST SERPL-CCNC: 32 U/L (ref 1–32)
BASOPHILS # BLD AUTO: 0.07 10*3/MM3 (ref 0–0.2)
BASOPHILS NFR BLD AUTO: 1.2 % (ref 0–1.5)
BILIRUB SERPL-MCNC: 0.3 MG/DL (ref 0–1.2)
BUN SERPL-MCNC: 13 MG/DL (ref 6–20)
BUN/CREAT SERPL: 16.3 (ref 7–25)
CALCIUM SPEC-SCNC: 9.8 MG/DL (ref 8.6–10.5)
CHLORIDE SERPL-SCNC: 102 MMOL/L (ref 98–107)
CO2 SERPL-SCNC: 28 MMOL/L (ref 22–29)
CREAT SERPL-MCNC: 0.8 MG/DL (ref 0.57–1)
DEPRECATED RDW RBC AUTO: 43.8 FL (ref 37–54)
EGFRCR SERPLBLD CKD-EPI 2021: 89.3 ML/MIN/1.73
EOSINOPHIL # BLD AUTO: 0.18 10*3/MM3 (ref 0–0.4)
EOSINOPHIL NFR BLD AUTO: 3.1 % (ref 0.3–6.2)
ERYTHROCYTE [DISTWIDTH] IN BLOOD BY AUTOMATED COUNT: 12.7 % (ref 12.3–15.4)
GLOBULIN UR ELPH-MCNC: 2.7 GM/DL
GLUCOSE SERPL-MCNC: 106 MG/DL (ref 65–99)
HCT VFR BLD AUTO: 44.2 % (ref 34–46.6)
HGB BLD-MCNC: 14.8 G/DL (ref 12–15.9)
HOLD SPECIMEN: NORMAL
HOLD SPECIMEN: NORMAL
IMM GRANULOCYTES # BLD AUTO: 0.02 10*3/MM3 (ref 0–0.05)
IMM GRANULOCYTES NFR BLD AUTO: 0.3 % (ref 0–0.5)
INR PPP: 0.8 (ref 0.9–1.1)
LYMPHOCYTES # BLD AUTO: 2.19 10*3/MM3 (ref 0.7–3.1)
LYMPHOCYTES NFR BLD AUTO: 37.8 % (ref 19.6–45.3)
MCH RBC QN AUTO: 31.7 PG (ref 26.6–33)
MCHC RBC AUTO-ENTMCNC: 33.5 G/DL (ref 31.5–35.7)
MCV RBC AUTO: 94.6 FL (ref 79–97)
MONOCYTES # BLD AUTO: 0.31 10*3/MM3 (ref 0.1–0.9)
MONOCYTES NFR BLD AUTO: 5.4 % (ref 5–12)
NEUTROPHILS NFR BLD AUTO: 3.02 10*3/MM3 (ref 1.7–7)
NEUTROPHILS NFR BLD AUTO: 52.2 % (ref 42.7–76)
NRBC BLD AUTO-RTO: 0 /100 WBC (ref 0–0.2)
PLATELET # BLD AUTO: 267 10*3/MM3 (ref 140–450)
PMV BLD AUTO: 9.7 FL (ref 6–12)
POTASSIUM SERPL-SCNC: 4.1 MMOL/L (ref 3.5–5.2)
PROT SERPL-MCNC: 7.3 G/DL (ref 6–8.5)
PROTHROMBIN TIME: 11.2 SECONDS (ref 12.1–15)
RBC # BLD AUTO: 4.67 10*6/MM3 (ref 3.77–5.28)
SODIUM SERPL-SCNC: 138 MMOL/L (ref 136–145)
WBC NRBC COR # BLD AUTO: 5.79 10*3/MM3 (ref 3.4–10.8)
WHOLE BLOOD HOLD COAG: NORMAL
WHOLE BLOOD HOLD SPECIMEN: NORMAL

## 2024-01-05 PROCEDURE — 36415 COLL VENOUS BLD VENIPUNCTURE: CPT

## 2024-01-05 PROCEDURE — 85610 PROTHROMBIN TIME: CPT | Performed by: PHYSICIAN ASSISTANT

## 2024-01-05 PROCEDURE — 85730 THROMBOPLASTIN TIME PARTIAL: CPT | Performed by: PHYSICIAN ASSISTANT

## 2024-01-05 PROCEDURE — 85025 COMPLETE CBC W/AUTO DIFF WBC: CPT | Performed by: PHYSICIAN ASSISTANT

## 2024-01-05 PROCEDURE — 25510000001 IOPAMIDOL PER 1 ML: Performed by: EMERGENCY MEDICINE

## 2024-01-05 PROCEDURE — 99285 EMERGENCY DEPT VISIT HI MDM: CPT

## 2024-01-05 PROCEDURE — 80053 COMPREHEN METABOLIC PANEL: CPT | Performed by: PHYSICIAN ASSISTANT

## 2024-01-05 PROCEDURE — 71275 CT ANGIOGRAPHY CHEST: CPT

## 2024-01-05 RX ADMIN — IOPAMIDOL 100 ML: 755 INJECTION, SOLUTION INTRAVENOUS at 18:54

## 2024-01-05 NOTE — ED PROVIDER NOTES
Subjective   History of Present Illness  53-year-old female presents to the ER fatigue and feeling short of air.  Patient states that for the past 3 days, when she attempts to walk up the stairs she feels very fatigued and short of breath.  She denies having any chest pain, cough, or recent illness.  Patient denies GI complaints, fever, or chills.    Patient has a hyper awareness of her body and health status due to multiple underlying disease processes.  Patient states that she had a reaction to the [employer] vaccine which initiated multiple health issues.  She states that she currently has POTS, IBS, chronic headaches, encephalitis, hypertension and hypotension.  She is monitored by her family physician, Dr. Craig.    The patient states her main concern is that she has micro clots and pulmonary embolism.  She denies a history of cancer, recent long distance travel or surgery, hormone use, or history of PE/DVT.  Patient states she is has this concern after doing some research online based on her current symptoms.        Review of Systems   Constitutional:  Positive for fatigue. Negative for chills and fever.   HENT:  Negative for congestion, sinus pressure, sore throat and voice change.    Eyes:  Negative for pain and visual disturbance.   Respiratory:  Positive for shortness of breath. Negative for cough and chest tightness.    Cardiovascular:  Negative for chest pain and palpitations.   Gastrointestinal:  Negative for abdominal pain, diarrhea, nausea and vomiting.   Genitourinary:  Negative for dysuria, flank pain and hematuria.   Musculoskeletal:  Negative for back pain, myalgias, neck pain and neck stiffness.        No calf pain   Skin:  Negative for rash.   Neurological:  Negative for dizziness, weakness, light-headedness and headaches.   Psychiatric/Behavioral:  Negative for agitation, confusion and suicidal ideas. The patient is not nervous/anxious.    All other systems reviewed and are  negative.      Past Medical History:   Diagnosis Date    Autonomic orthostatic hypotension     Disease of thyroid gland     Encephalitis     GERD (gastroesophageal reflux disease)     Idiopathic intracranial hypertension     Irritable bowel syndrome     Muscle spasms of both lower extremities     POTS (postural orthostatic tachycardia syndrome)     Supine hypertension 09/30/2021       Allergies   Allergen Reactions    Lanolin Rash       Past Surgical History:   Procedure Laterality Date    ANKLE ARTHROSCOPY Right     CHOLECYSTECTOMY      COCCYGECTOMY      COLONOSCOPY W/ POLYPECTOMY N/A 6/7/2021    Procedure: COLONOSCOPY WITH POLYPECTOMY;  Surgeon: Rey Fishman MD;  [facility];  Service: Gastroenterology;  Laterality: N/A;  polyps: cecal x1, transverse x1, sigmoid x1    TUBAL ABDOMINAL LIGATION         Family History   Problem Relation Age of Onset    Lung cancer Mother     Migraines Sister     Diabetes type II Brother     Dementia Paternal Grandmother     Breast cancer Neg Hx        Social History     Socioeconomic History    Marital status:    Tobacco Use    Smoking status: Never    Smokeless tobacco: Never   Vaping Use    Vaping Use: Never used   Substance and Sexual Activity    Alcohol use: No    Drug use: No    Sexual activity: Defer           Objective   Physical Exam  Vitals and nursing note reviewed.   Constitutional:       General: She is not in acute distress.     Appearance: Normal appearance.   HENT:      Head: Normocephalic and atraumatic.      Nose: Nose normal.      Mouth/Throat:      Mouth: Mucous membranes are moist.   Eyes:      Conjunctiva/sclera: Conjunctivae normal.      Pupils: Pupils are equal, round, and reactive to light.   Cardiovascular:      Rate and Rhythm: Normal rate and regular rhythm.   Pulmonary:      Effort: Pulmonary effort is normal. No respiratory distress.      Breath sounds: Normal breath sounds. No stridor. No wheezing, rhonchi or rales.       Comments: Talks in full sentences  Chest:      Chest wall: No tenderness.   Abdominal:      Palpations: Abdomen is soft.      Tenderness: There is no abdominal tenderness.   Musculoskeletal:         General: Normal range of motion.      Cervical back: Normal range of motion and neck supple.   Skin:     General: Skin is warm and dry.   Neurological:      General: No focal deficit present.      Mental Status: She is alert and oriented to person, place, and time.   Psychiatric:         Mood and Affect: Mood normal.         Behavior: Behavior normal.         Thought Content: Thought content normal.         Judgment: Judgment normal.         Procedures           ED Course  ED Course as of 01/05/24 1923 Fri Jan 05, 2024 1825 The patient added to her history that she bought a pulse ox machine that is variable, stating that it occasionally goes down to 85% and today was going down to 93% when lying down.  At bedside, the patient was on the hospital pulse ox.  We placed her purchased pulse ox on her other hand.  We trialed the patient sitting and lying flat.  Her hospital pulse ox remained at 99%.  Her store-bought pulse ox wavered between 95 and 97%.  The patient's pulse ox is likely on calibrated. [AK]   1913 Recheck patient at bedside.  Patient is well-appearing, speaks full sentences, and has a sat of 98% on room air.   [AK]      ED Course User Index  [AK] Lyla Muller, LIDIA                                             Medical Decision Making  CTA imaging does not show any evidence of infiltrate, pulmonary effusion, mass, lesion, or pulmonary embolism.  This was reviewed by me and discussed extensively with the patient at bedside.  I also discussed the patient's PT/INR is actually on the low side and would not be favorable for micro clotting.    Recheck patient at bedside.  Patient is well-appearing, speaks full sentences, and has a sat of 98% on room air.  She does seem fixated on her oxygen saturation.  She  does not have any objective respiratory symptoms or findings.  I will be referring her to a pulmonologist for pulmonary function testing if she would like to follow-up.  I mainly tried to reassure the patient that her exam as well as labs and CTA were within normal limits and she appears to be of good lung health.  Shared decision making to discharge home.    Shared decision making to discharge home.        Amount and/or Complexity of Data Reviewed  Independent Historian: spouse  Labs: ordered.  Radiology: ordered.    Risk  Prescription drug management.        Final diagnoses:   Shortness of breath       ED Disposition  ED Disposition       ED Disposition   Discharge    Condition   Stable    Comment   --               Keara Craig MD  [address] [facility] [address]  [place] [address]  [phone]      As needed    Meena Barnhart MD  [address]  [address]  [place] [address]  [phone]    Call in 1 week  As needed         Medication List        Changed      traZODone 50 MG tablet  Commonly known as: DESYREL  Take 1 tablet by mouth At Night As Needed for Sleep.  What changed: additional instructions                 Lyla Muller PA-C  01/05/24 1921

## 2024-01-16 RX ORDER — TRAZODONE HYDROCHLORIDE 50 MG/1
50 TABLET ORAL NIGHTLY PRN
Qty: 90 TABLET | Refills: 1 | Status: SHIPPED | OUTPATIENT
Start: 2024-01-16

## 2024-01-16 NOTE — TELEPHONE ENCOUNTER
Rx Refill Note  Requested Prescriptions     Pending Prescriptions Disp Refills    traZODone (DESYREL) 50 MG tablet 90 tablet 1     Sig: Take 1 tablet by mouth At Night As Needed for Sleep.      Last office visit with prescribing clinician: 12/7/2023   Last telemedicine visit with prescribing clinician: Visit date not found   Next office visit with prescribing clinician: 4/25/2024                         Would you like a call back once the refill request has been completed: [] Yes [] No    If the office needs to give you a call back, can they leave a voicemail: [] Yes [] No    Tessa Guerra CMA  01/16/24, 11:03 EST

## 2024-01-17 DIAGNOSIS — J98.11 ATELECTASIS: ICD-10-CM

## 2024-01-17 DIAGNOSIS — R09.02 HYPOXIA: Primary | ICD-10-CM

## 2024-01-25 ENCOUNTER — HOSPITAL ENCOUNTER (OUTPATIENT)
Dept: MAMMOGRAPHY | Facility: HOSPITAL | Age: 54
Discharge: HOME | End: 2024-01-25
Admitting: FAMILY MEDICINE
Payer: COMMERCIAL

## 2024-01-25 DIAGNOSIS — Z00.00 ANNUAL PHYSICAL EXAM: ICD-10-CM

## 2024-01-25 PROCEDURE — 77067 SCR MAMMO BI INCL CAD: CPT

## 2024-01-25 PROCEDURE — 77063 BREAST TOMOSYNTHESIS BI: CPT

## 2024-01-29 DIAGNOSIS — M54.2 CHRONIC NECK PAIN: ICD-10-CM

## 2024-01-29 DIAGNOSIS — G89.29 CHRONIC NECK PAIN: ICD-10-CM

## 2024-01-29 RX ORDER — METHOCARBAMOL 750 MG/1
750 TABLET, FILM COATED ORAL DAILY
Qty: 90 TABLET | Refills: 3 | Status: SHIPPED | OUTPATIENT
Start: 2024-01-29

## 2024-01-29 NOTE — TELEPHONE ENCOUNTER
Rx Refill Note  Requested Prescriptions     Pending Prescriptions Disp Refills    methocarbamol (ROBAXIN) 750 MG tablet 90 tablet 3     Sig: Take 1 tablet by mouth Daily.      Last office visit with prescribing clinician: 12/7/2023   Last telemedicine visit with prescribing clinician: Visit date not found   Next office visit with prescribing clinician: 4/25/2024                         Would you like a call back once the refill request has been completed: [] Yes [] No    If the office needs to give you a call back, can they leave a voicemail: [] Yes [] No    Tessa Guerra CMA  01/29/24, 14:33 EST

## 2024-01-30 RX ORDER — ALBUTEROL SULFATE 90 UG/1
2 AEROSOL, METERED RESPIRATORY (INHALATION) EVERY 4 HOURS PRN
Qty: 18 G | Refills: 1 | Status: SHIPPED | OUTPATIENT
Start: 2024-01-30

## 2024-01-30 NOTE — TELEPHONE ENCOUNTER
Rx Refill Note  Requested Prescriptions     Pending Prescriptions Disp Refills    albuterol sulfate HFA (Ventolin HFA) 108 (90 Base) MCG/ACT inhaler 18 g 1     Sig: Inhale 2 puffs into the lungs Every 4 (Four) Hours As Needed for Wheezing.      Last office visit with prescribing clinician: 12/7/2023   Last telemedicine visit with prescribing clinician: Visit date not found   Next office visit with prescribing clinician: 4/25/2024                         Would you like a call back once the refill request has been completed: [] Yes [] No    If the office needs to give you a call back, can they leave a voicemail: [] Yes [] No    Tessa Guerra CMA  01/30/24, 08:37 EST

## 2024-03-04 RX ORDER — CRISABOROLE 20 MG/G
1 OINTMENT TOPICAL 2 TIMES DAILY
Qty: 60 G | Refills: 2 | Status: SHIPPED | OUTPATIENT
Start: 2024-03-04

## 2024-03-04 NOTE — TELEPHONE ENCOUNTER
Rx Refill Note  Requested Prescriptions     Pending Prescriptions Disp Refills    Crisaborole (Eucrisa) 2 % ointment 60 g 2     Sig: Apply 1 application topically to the appropriate area as directed 2 (Two) Times a Day.      Last office visit with prescribing clinician: 12/7/2023   Last telemedicine visit with prescribing clinician: Visit date not found   Next office visit with prescribing clinician: 4/25/2024                         Would you like a call back once the refill request has been completed: [] Yes [] No    If the office needs to give you a call back, can they leave a voicemail: [] Yes [] No    Nazia Mckeon, PCT  03/04/24, 12:22 EST

## 2024-04-16 NOTE — TELEPHONE ENCOUNTER
Rx Refill Note  Requested Prescriptions     Pending Prescriptions Disp Refills    Naltrexone HCl, Pain, 4.5 MG capsule 90 capsule 1     Sig: Take 4.5 mg by mouth Every Night.      Last office visit with prescribing clinician: 12/7/2023   Last telemedicine visit with prescribing clinician: Visit date not found   Next office visit with prescribing clinician: 4/18/2024                         Would you like a call back once the refill request has been completed: [] Yes [] No    If the office needs to give you a call back, can they leave a voicemail: [] Yes [] No    Nazia Westfall MA  04/16/24, 14:11 EDT

## 2024-04-18 ENCOUNTER — HOSPITAL ENCOUNTER (OUTPATIENT)
Dept: GENERAL RADIOLOGY | Facility: HOSPITAL | Age: 54
Discharge: HOME | End: 2024-04-18
Admitting: FAMILY MEDICINE
Payer: MEDICARE

## 2024-04-18 ENCOUNTER — OFFICE VISIT (OUTPATIENT)
Dept: INTERNAL MEDICINE | Facility: CLINIC | Age: 54
End: 2024-04-18
Payer: MEDICARE

## 2024-04-18 VITALS
OXYGEN SATURATION: 98 % | WEIGHT: 215 LBS | TEMPERATURE: 98.2 F | HEIGHT: 71 IN | SYSTOLIC BLOOD PRESSURE: 140 MMHG | DIASTOLIC BLOOD PRESSURE: 88 MMHG | HEART RATE: 80 BPM | BODY MASS INDEX: 30.1 KG/M2

## 2024-04-18 DIAGNOSIS — G89.29 CHRONIC RIGHT SHOULDER PAIN: ICD-10-CM

## 2024-04-18 DIAGNOSIS — M25.511 CHRONIC RIGHT SHOULDER PAIN: ICD-10-CM

## 2024-04-18 DIAGNOSIS — R73.01 ELEVATED FASTING GLUCOSE: ICD-10-CM

## 2024-04-18 DIAGNOSIS — G90.A POTS (POSTURAL ORTHOSTATIC TACHYCARDIA SYNDROME): Primary | ICD-10-CM

## 2024-04-18 DIAGNOSIS — E03.9 ACQUIRED HYPOTHYROIDISM: ICD-10-CM

## 2024-04-18 DIAGNOSIS — E61.1 IRON DEFICIENCY: ICD-10-CM

## 2024-04-18 PROCEDURE — 3077F SYST BP >= 140 MM HG: CPT | Performed by: FAMILY MEDICINE

## 2024-04-18 PROCEDURE — 99214 OFFICE O/P EST MOD 30 MIN: CPT | Performed by: FAMILY MEDICINE

## 2024-04-18 PROCEDURE — 3079F DIAST BP 80-89 MM HG: CPT | Performed by: FAMILY MEDICINE

## 2024-04-18 PROCEDURE — 1159F MED LIST DOCD IN RCRD: CPT | Performed by: FAMILY MEDICINE

## 2024-04-18 PROCEDURE — 1160F RVW MEDS BY RX/DR IN RCRD: CPT | Performed by: FAMILY MEDICINE

## 2024-04-18 PROCEDURE — 73030 X-RAY EXAM OF SHOULDER: CPT

## 2024-04-18 RX ORDER — NALTREXONE HYDROCHLORIDE 50 MG/1
TABLET, FILM COATED ORAL EVERY EVENING
OUTPATIENT
Start: 2024-04-18

## 2024-04-18 NOTE — PROGRESS NOTES
Subjective   Nadja Shipley is a 54 y.o. female presenting today for follow up of   Chief Complaint   Patient presents with    POTS     F/u    Shoulder Injury    Dizziness    Results     Pulse ox study from pul       Shoulder Injury     Dizziness  Associated symptoms include arthralgias.      Pt presents for 4 month f/u on debilitating POTS.  Pt presents for 3 month f/u POTS.  She is still working with Dr. Jaramillo, with [facility], naturopathic medicine.  She requests urine GC and viral/lyme testing.  She has seen Dr. Bailey and [facility], Dr. Carrasquillo, and Dr. Peña.  She saw CHHAYA Agrawal.  She has a new grandson which has been enjoyable.  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  She is now unable to work or do much at all.  She has seen Dr. Nik Zavaleta in the interim and had PFTs and cardiopulmonary stress test.    Right shoulder pain X 4 month.  She three a stick for her dog in December and says she felt something tear; she has been having pain with external rotation since then.    Patient Active Problem List   Diagnosis    Chronic neck pain    Rosacea    Insomnia    Annual physical exam    Hot flashes    Screening for colon cancer    History of colon polyps    Irritable bowel syndrome with diarrhea    POTS (postural orthostatic tachycardia syndrome)    Supine hypertension    Adverse effect of COVID-19 vaccine    Orthostatic hypertension    Idiopathic intracranial hypertension    Metabolic syndrome    Allergic conjunctivitis of both eyes    Eosinophilic esophagitis    Numbness and tingling of both legs    Acquired hypothyroidism    Prediabetes       Current Outpatient Medications on File Prior to Visit   Medication Sig    acetaZOLAMIDE (DIAMOX) 250 MG tablet Take 1 tablet by mouth 3 (Three) Times a Day. (Patient  "taking differently: Take 1 tablet by mouth As Needed.)    albuterol sulfate HFA (Ventolin HFA) 108 (90 Base) MCG/ACT inhaler Inhale 2 puffs into the lungs Every 4 (Four) Hours As Needed for Wheezing.    Crisaborole (Eucrisa) 2 % ointment Apply 1 application topically to the appropriate area as directed 2 (Two) Times a Day.    dicyclomine (BENTYL) 20 MG tablet Take 1 tablet by mouth Every 6 (Six) Hours.    famotidine (PEPCID) 40 MG tablet Take 1 tablet by mouth 2 (Two) Times a Day.    levothyroxine (SYNTHROID, LEVOTHROID) 75 MCG tablet Take 1 tablet by mouth Daily.    methocarbamol (ROBAXIN) 750 MG tablet Take 1 tablet by mouth Daily.    Naltrexone HCl, Pain, 4.5 MG capsule Take 4.5 mg by mouth Every Night.    olopatadine (PATANOL) 0.1 % ophthalmic solution Administer 1 drop to both eyes 2 (Two) Times a Day As Needed for Allergies.    traZODone (DESYREL) 50 MG tablet Take 1 tablet by mouth At Night As Needed for Sleep. (Patient not taking: Reported on 4/18/2024)     No current facility-administered medications on file prior to visit.          The following portions of the patient's history were reviewed and updated as appropriate: allergies, current medications, past family history, past medical history, past social history, past surgical history and problem list.    Review of Systems   Musculoskeletal:  Positive for arthralgias.   Neurological:  Positive for dizziness.       Objective   Vitals:    04/18/24 1330   BP: 140/88   BP Location: Right arm   Patient Position: Sitting   Cuff Size: Large Adult   Pulse: 80   Temp: 98.2 °F (36.8 °C)   TempSrc: Infrared   SpO2: 98%   Weight: 97.5 kg (215 lb)   Height: 180.3 cm (70.98\")       BP Readings from Last 3 Encounters:   04/18/24 140/88   01/05/24 154/97   12/07/23 120/78        Wt Readings from Last 3 Encounters:   04/18/24 97.5 kg (215 lb)   01/05/24 95.3 kg (210 lb)   12/07/23 95.5 kg (210 lb 9.6 oz)        Body mass index is 30 kg/m².  Nursing notes and vitals " reviewed.    Physical Exam  Vitals and nursing note reviewed.   Constitutional:       Appearance: Normal appearance. She is well-developed.   HENT:      Head: Normocephalic and atraumatic.      Mouth/Throat:      Mouth: Mucous membranes are moist.   Eyes:      Extraocular Movements: Extraocular movements intact.      Conjunctiva/sclera: Conjunctivae normal.   Neck:      Thyroid: No thyromegaly.   Cardiovascular:      Rate and Rhythm: Normal rate and regular rhythm.      Heart sounds: Normal heart sounds.   Pulmonary:      Effort: Pulmonary effort is normal.      Breath sounds: Normal breath sounds.   Abdominal:      Palpations: Abdomen is soft.      Tenderness: There is no abdominal tenderness.   Musculoskeletal:      Right shoulder: Decreased range of motion (abduction).      Left shoulder: Normal.      Cervical back: Neck supple. No rigidity.      Right lower leg: No edema.      Left lower leg: No edema.      Comments: + lift off test   Skin:     General: Skin is warm and dry.   Neurological:      General: No focal deficit present.      Mental Status: She is alert and oriented to person, place, and time.   Psychiatric:         Mood and Affect: Mood normal.         Behavior: Behavior normal.         No results found for this or any previous visit (from the past 672 hour(s)).      Assessment & Plan   Diagnoses and all orders for this visit:    1. POTS (postural orthostatic tachycardia syndrome) (Primary)  -     Comprehensive Metabolic Panel    2. Acquired hypothyroidism  -     CBC & Differential  -     TSH  -     T4, Free    3. Elevated fasting glucose  -     Hemoglobin A1c  -     Vitamin B12    4. Iron deficiency  -     Ferritin  -     Iron Profile    5. Chronic right shoulder pain  -     Ambulatory Referral to Physical Therapy Evaluate and treat  -     XR Shoulder 2+ View Right; Future      Labs today.    Referred for physical therapy.  XR shoulder.        Medications, including side effects, were discussed with  the patient. Patient verbalized understanding.  The plan of care was discussed. All questions were answered. Patient verbalized understanding.      Return in about 6 months (around 10/18/2024).

## 2024-04-19 LAB
ALBUMIN SERPL-MCNC: 4.3 G/DL (ref 3.5–5.2)
ALBUMIN/GLOB SERPL: 1.8 G/DL
ALP SERPL-CCNC: 140 U/L (ref 39–117)
ALT SERPL-CCNC: 35 U/L (ref 1–33)
AST SERPL-CCNC: 35 U/L (ref 1–32)
BASOPHILS # BLD AUTO: 0.09 10*3/MM3 (ref 0–0.2)
BASOPHILS NFR BLD AUTO: 1.6 % (ref 0–1.5)
BILIRUB SERPL-MCNC: 0.5 MG/DL (ref 0–1.2)
BUN SERPL-MCNC: 11 MG/DL (ref 6–20)
BUN/CREAT SERPL: 12.8 (ref 7–25)
CALCIUM SERPL-MCNC: 9.5 MG/DL (ref 8.6–10.5)
CHLORIDE SERPL-SCNC: 106 MMOL/L (ref 98–107)
CO2 SERPL-SCNC: 24.2 MMOL/L (ref 22–29)
CREAT SERPL-MCNC: 0.86 MG/DL (ref 0.57–1)
EGFRCR SERPLBLD CKD-EPI 2021: 81.4 ML/MIN/1.73
EOSINOPHIL # BLD AUTO: 0.15 10*3/MM3 (ref 0–0.4)
EOSINOPHIL NFR BLD AUTO: 2.7 % (ref 0.3–6.2)
ERYTHROCYTE [DISTWIDTH] IN BLOOD BY AUTOMATED COUNT: 12.2 % (ref 12.3–15.4)
FERRITIN SERPL-MCNC: 95.7 NG/ML (ref 13–150)
GLOBULIN SER CALC-MCNC: 2.4 GM/DL
GLUCOSE SERPL-MCNC: 96 MG/DL (ref 65–99)
HBA1C MFR BLD: 5.7 % (ref 4.8–5.6)
HCT VFR BLD AUTO: 42.4 % (ref 34–46.6)
HGB BLD-MCNC: 14.1 G/DL (ref 12–15.9)
IMM GRANULOCYTES # BLD AUTO: 0.02 10*3/MM3 (ref 0–0.05)
IMM GRANULOCYTES NFR BLD AUTO: 0.4 % (ref 0–0.5)
IRON SATN MFR SERPL: 36 % (ref 20–50)
IRON SERPL-MCNC: 134 MCG/DL (ref 37–145)
LYMPHOCYTES # BLD AUTO: 2.12 10*3/MM3 (ref 0.7–3.1)
LYMPHOCYTES NFR BLD AUTO: 38 % (ref 19.6–45.3)
MCH RBC QN AUTO: 30.7 PG (ref 26.6–33)
MCHC RBC AUTO-ENTMCNC: 33.3 G/DL (ref 31.5–35.7)
MCV RBC AUTO: 92.2 FL (ref 79–97)
MONOCYTES # BLD AUTO: 0.34 10*3/MM3 (ref 0.1–0.9)
MONOCYTES NFR BLD AUTO: 6.1 % (ref 5–12)
NEUTROPHILS # BLD AUTO: 2.86 10*3/MM3 (ref 1.7–7)
NEUTROPHILS NFR BLD AUTO: 51.2 % (ref 42.7–76)
NRBC BLD AUTO-RTO: 0 /100 WBC (ref 0–0.2)
PLATELET # BLD AUTO: 263 10*3/MM3 (ref 140–450)
POTASSIUM SERPL-SCNC: 4.2 MMOL/L (ref 3.5–5.2)
PROT SERPL-MCNC: 6.7 G/DL (ref 6–8.5)
RBC # BLD AUTO: 4.6 10*6/MM3 (ref 3.77–5.28)
SODIUM SERPL-SCNC: 141 MMOL/L (ref 136–145)
T4 FREE SERPL-MCNC: 1.28 NG/DL (ref 0.93–1.7)
TIBC SERPL-MCNC: 375 MCG/DL
TSH SERPL DL<=0.005 MIU/L-ACNC: 2.36 UIU/ML (ref 0.27–4.2)
UIBC SERPL-MCNC: 241 MCG/DL (ref 112–346)
VIT B12 SERPL-MCNC: 1496 PG/ML (ref 211–946)
WBC # BLD AUTO: 5.58 10*3/MM3 (ref 3.4–10.8)

## 2024-04-23 ENCOUNTER — TRANSCRIBE ORDERS (OUTPATIENT)
Dept: ADMINISTRATIVE | Facility: HOSPITAL | Age: 54
End: 2024-04-23
Payer: MEDICARE

## 2024-04-23 DIAGNOSIS — R06.09 DYSPNEA ON EXERTION: Primary | ICD-10-CM

## 2024-05-02 ENCOUNTER — HOSPITAL ENCOUNTER (OUTPATIENT)
Dept: GENERAL RADIOLOGY | Facility: HOSPITAL | Age: 54
Discharge: HOME | End: 2024-05-02
Admitting: INTERNAL MEDICINE
Payer: MEDICARE

## 2024-05-02 ENCOUNTER — HOSPITAL ENCOUNTER (OUTPATIENT)
Dept: NUCLEAR MEDICINE | Facility: HOSPITAL | Age: 54
Discharge: HOME | End: 2024-05-02
Payer: MEDICARE

## 2024-05-02 DIAGNOSIS — R06.09 DYSPNEA ON EXERTION: ICD-10-CM

## 2024-05-02 PROCEDURE — 78582 LUNG VENTILAT&PERFUS IMAGING: CPT

## 2024-05-02 PROCEDURE — 71046 X-RAY EXAM CHEST 2 VIEWS: CPT

## 2024-05-02 PROCEDURE — A9567 TECHNETIUM TC-99M AEROSOL: HCPCS | Performed by: INTERNAL MEDICINE

## 2024-05-02 PROCEDURE — A9540 TC99M MAA: HCPCS | Performed by: INTERNAL MEDICINE

## 2024-05-02 PROCEDURE — 0 TECHNETIUM ALBUMIN AGGREGATED: Performed by: INTERNAL MEDICINE

## 2024-05-02 PROCEDURE — 0 TECHNETIUM TC 99M PENTETATE INHALER: Performed by: INTERNAL MEDICINE

## 2024-05-02 RX ADMIN — KIT FOR THE PREPARATION OF TECHNETIUM TC 99M ALBUMIN AGGREGATED 1 DOSE: 2.5 INJECTION, POWDER, FOR SOLUTION INTRAVENOUS at 10:50

## 2024-05-02 RX ADMIN — KIT FOR THE PREPARATION OF TECHNETIUM TC 99M PENTETATE 1 DOSE: 20 INJECTION, POWDER, LYOPHILIZED, FOR SOLUTION INTRAVENOUS; RESPIRATORY (INHALATION) at 10:40

## 2024-08-01 ENCOUNTER — OFFICE VISIT (OUTPATIENT)
Dept: INTERNAL MEDICINE | Facility: CLINIC | Age: 54
End: 2024-08-01
Payer: MEDICARE

## 2024-08-01 VITALS
TEMPERATURE: 98.4 F | DIASTOLIC BLOOD PRESSURE: 110 MMHG | BODY MASS INDEX: 31.14 KG/M2 | OXYGEN SATURATION: 95 % | HEIGHT: 71 IN | WEIGHT: 222.4 LBS | SYSTOLIC BLOOD PRESSURE: 152 MMHG | HEART RATE: 78 BPM

## 2024-08-01 DIAGNOSIS — E03.9 ACQUIRED HYPOTHYROIDISM: ICD-10-CM

## 2024-08-01 DIAGNOSIS — I95.89 OTHER HYPOTENSION: ICD-10-CM

## 2024-08-01 DIAGNOSIS — R73.03 PREDIABETES: ICD-10-CM

## 2024-08-01 DIAGNOSIS — M54.2 CHRONIC NECK PAIN: ICD-10-CM

## 2024-08-01 DIAGNOSIS — G89.29 CHRONIC NECK PAIN: ICD-10-CM

## 2024-08-01 DIAGNOSIS — G90.A POTS (POSTURAL ORTHOSTATIC TACHYCARDIA SYNDROME): Primary | ICD-10-CM

## 2024-08-01 DIAGNOSIS — E03.9 HYPOTHYROIDISM, UNSPECIFIED TYPE: ICD-10-CM

## 2024-08-01 DIAGNOSIS — R79.89 ELEVATED LFTS: ICD-10-CM

## 2024-08-01 DIAGNOSIS — K58.0 IRRITABLE BOWEL SYNDROME WITH DIARRHEA: ICD-10-CM

## 2024-08-01 DIAGNOSIS — G47.34 HYPOXIA, SLEEP RELATED: ICD-10-CM

## 2024-08-01 DIAGNOSIS — Z86.010 HISTORY OF COLON POLYPS: ICD-10-CM

## 2024-08-01 PROCEDURE — 1159F MED LIST DOCD IN RCRD: CPT | Performed by: FAMILY MEDICINE

## 2024-08-01 PROCEDURE — 3077F SYST BP >= 140 MM HG: CPT | Performed by: FAMILY MEDICINE

## 2024-08-01 PROCEDURE — 3080F DIAST BP >= 90 MM HG: CPT | Performed by: FAMILY MEDICINE

## 2024-08-01 PROCEDURE — 99214 OFFICE O/P EST MOD 30 MIN: CPT | Performed by: FAMILY MEDICINE

## 2024-08-01 PROCEDURE — 1160F RVW MEDS BY RX/DR IN RCRD: CPT | Performed by: FAMILY MEDICINE

## 2024-08-01 RX ORDER — LEVOTHYROXINE SODIUM 0.07 MG/1
75 TABLET ORAL DAILY
Qty: 90 TABLET | Refills: 3 | Status: SHIPPED | OUTPATIENT
Start: 2024-08-01

## 2024-08-01 RX ORDER — METHOCARBAMOL 750 MG/1
750 TABLET, FILM COATED ORAL DAILY
Qty: 90 TABLET | Refills: 3 | Status: SHIPPED | OUTPATIENT
Start: 2024-08-01

## 2024-08-01 RX ORDER — ACETAZOLAMIDE 250 MG/1
250 TABLET ORAL 3 TIMES DAILY
Qty: 270 TABLET | Refills: 3 | Status: SHIPPED | OUTPATIENT
Start: 2024-08-01

## 2024-08-01 RX ORDER — CRISABOROLE 20 MG/G
1 OINTMENT TOPICAL 2 TIMES DAILY
Qty: 60 G | Refills: 2 | Status: SHIPPED | OUTPATIENT
Start: 2024-08-01

## 2024-08-01 RX ORDER — DICYCLOMINE HCL 20 MG
20 TABLET ORAL EVERY 6 HOURS
Qty: 120 TABLET | Refills: 5 | Status: SHIPPED | OUTPATIENT
Start: 2024-08-01

## 2024-08-01 RX ORDER — FAMOTIDINE 40 MG/1
40 TABLET, FILM COATED ORAL 2 TIMES DAILY
Qty: 180 TABLET | Refills: 3 | Status: SHIPPED | OUTPATIENT
Start: 2024-08-01

## 2024-08-01 RX ORDER — OLOPATADINE HYDROCHLORIDE 1 MG/ML
1 SOLUTION/ DROPS OPHTHALMIC 2 TIMES DAILY PRN
Qty: 5 ML | Refills: 11 | Status: SHIPPED | OUTPATIENT
Start: 2024-08-01

## 2024-08-01 RX ORDER — ALBUTEROL SULFATE 90 UG/1
2 AEROSOL, METERED RESPIRATORY (INHALATION) EVERY 4 HOURS PRN
Qty: 18 G | Refills: 1 | Status: SHIPPED | OUTPATIENT
Start: 2024-08-01

## 2024-08-01 NOTE — PROGRESS NOTES
Subjective   Nadja Shipley is a 54 y.o. female presenting today for follow up of   Chief Complaint   Patient presents with    POTS     6 month follow up    Edema     Retaining fluid in her abdomen, would like her kidney function checked       Shoulder Injury     Dizziness  Associated symptoms include arthralgias.      Pt presents for 3 month f/u on debilitating POTS.  She is still working with Dr. Jaramillo, with [facility], naturopathic medicine; she is doing oral chelation therapy with Dr. Stephen Mercado with [facility] as part of a research study.  She has seen Dr. Bailey and [facility], Dr. Carrasquillo, and Dr. Peña, and Dr. Zavaleta.  She saw Dr. Monreal [facility].  She enjoys her grandson.  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  She is now unable to work or do much at all.      She has been monitoring home oxygen and she has been dropping into the 80s while she sleeps.    Patient Active Problem List   Diagnosis    Chronic neck pain    Rosacea    Insomnia    Annual physical exam    Hot flashes    Screening for colon cancer    History of colon polyps    Irritable bowel syndrome with diarrhea    POTS (postural orthostatic tachycardia syndrome)    Supine hypertension    Adverse effect of COVID-19 vaccine    Orthostatic hypertension    Idiopathic intracranial hypertension    Metabolic syndrome    Allergic conjunctivitis of both eyes    Eosinophilic esophagitis    Numbness and tingling of both legs    Acquired hypothyroidism    Prediabetes       Current Outpatient Medications on File Prior to Visit   Medication Sig    [DISCONTINUED] acetaZOLAMIDE (DIAMOX) 250 MG tablet Take 1 tablet by mouth 3 (Three) Times a Day. (Patient taking differently: Take 1 tablet by mouth As Needed.)    [DISCONTINUED] albuterol sulfate HFA (Ventolin HFA)  "108 (90 Base) MCG/ACT inhaler Inhale 2 puffs into the lungs Every 4 (Four) Hours As Needed for Wheezing.    [DISCONTINUED] Crisaborole (Eucrisa) 2 % ointment Apply 1 application topically to the appropriate area as directed 2 (Two) Times a Day.    [DISCONTINUED] dicyclomine (BENTYL) 20 MG tablet Take 1 tablet by mouth Every 6 (Six) Hours.    [DISCONTINUED] famotidine (PEPCID) 40 MG tablet Take 1 tablet by mouth 2 (Two) Times a Day.    [DISCONTINUED] levothyroxine (SYNTHROID, LEVOTHROID) 75 MCG tablet Take 1 tablet by mouth Daily.    [DISCONTINUED] methocarbamol (ROBAXIN) 750 MG tablet Take 1 tablet by mouth Daily.    [DISCONTINUED] Naltrexone HCl, Pain, 4.5 MG capsule Take 4.5 mg by mouth Every Night.    [DISCONTINUED] olopatadine (PATANOL) 0.1 % ophthalmic solution Administer 1 drop to both eyes 2 (Two) Times a Day As Needed for Allergies.    [DISCONTINUED] traZODone (DESYREL) 50 MG tablet Take 1 tablet by mouth At Night As Needed for Sleep. (Patient not taking: Reported on 4/18/2024)     No current facility-administered medications on file prior to visit.          The following portions of the patient's history were reviewed and updated as appropriate: allergies, current medications, past family history, past medical history, past social history, past surgical history and problem list.    Review of Systems   Musculoskeletal:  Positive for arthralgias.   Neurological:  Positive for dizziness.       Objective   Vitals:    08/01/24 1358   BP: (!) 152/110   BP Location: Left arm   Patient Position: Sitting   Cuff Size: Large Adult   Pulse: 78   Temp: 98.4 °F (36.9 °C)   TempSrc: Infrared   SpO2: 95%   Weight: 101 kg (222 lb 6.4 oz)   Height: 180.3 cm (70.98\")       BP Readings from Last 3 Encounters:   08/01/24 (!) 152/110   04/18/24 140/88   01/05/24 154/97        Wt Readings from Last 3 Encounters:   08/01/24 101 kg (222 lb 6.4 oz)   04/18/24 97.5 kg (215 lb)   01/05/24 95.3 kg (210 lb)        Body mass index is " 31.04 kg/m².  Nursing notes and vitals reviewed.    Physical Exam  Vitals and nursing note reviewed.   Constitutional:       Appearance: Normal appearance. She is well-developed.   HENT:      Head: Normocephalic and atraumatic.      Mouth/Throat:      Mouth: Mucous membranes are moist.   Eyes:      Extraocular Movements: Extraocular movements intact.      Conjunctiva/sclera: Conjunctivae normal.   Neck:      Thyroid: No thyromegaly.   Cardiovascular:      Rate and Rhythm: Normal rate and regular rhythm.      Heart sounds: Normal heart sounds.   Pulmonary:      Effort: Pulmonary effort is normal.      Breath sounds: Normal breath sounds.   Abdominal:      Palpations: Abdomen is soft.      Tenderness: There is no abdominal tenderness.   Musculoskeletal:      Cervical back: Neck supple. No rigidity.      Right lower leg: No edema.      Left lower leg: No edema.      Comments: + lift off test   Skin:     General: Skin is warm and dry.   Neurological:      General: No focal deficit present.      Mental Status: She is alert and oriented to person, place, and time.   Psychiatric:         Mood and Affect: Mood normal.         Behavior: Behavior normal.         No results found for this or any previous visit (from the past 672 hour(s)).      Assessment & Plan   Diagnoses and all orders for this visit:    1. POTS (postural orthostatic tachycardia syndrome) (Primary)  -     Adult Transthoracic Echo Complete W/ Cont if Necessary Per Protocol; Future    2. Hypothyroidism, unspecified type  -     levothyroxine (SYNTHROID, LEVOTHROID) 75 MCG tablet; Take 1 tablet by mouth Daily.  Dispense: 90 tablet; Refill: 3    3. Chronic neck pain  -     methocarbamol (ROBAXIN) 750 MG tablet; Take 1 tablet by mouth Daily.  Dispense: 90 tablet; Refill: 3    4. Acquired hypothyroidism    5. Prediabetes    6. Irritable bowel syndrome with diarrhea    7. History of colon polyps    8. Elevated LFTs  -     Comprehensive Metabolic Panel    9. Other  hypotension  -     Adult Transthoracic Echo Complete W/ Cont if Necessary Per Protocol; Future    10. Hypoxia, sleep related  -     Ambulatory Referral to Sleep Medicine    Other orders  -     acetaZOLAMIDE (DIAMOX) 250 MG tablet; Take 1 tablet by mouth 3 (Three) Times a Day.  Dispense: 270 tablet; Refill: 3  -     albuterol sulfate HFA (Ventolin HFA) 108 (90 Base) MCG/ACT inhaler; Inhale 2 puffs into the lungs Every 4 (Four) Hours As Needed for Wheezing.  Dispense: 18 g; Refill: 1  -     Crisaborole (Eucrisa) 2 % ointment; Apply 1 application topically to the appropriate area as directed 2 (Two) Times a Day.  Dispense: 60 g; Refill: 2  -     dicyclomine (BENTYL) 20 MG tablet; Take 1 tablet by mouth Every 6 (Six) Hours.  Dispense: 120 tablet; Refill: 5  -     famotidine (PEPCID) 40 MG tablet; Take 1 tablet by mouth 2 (Two) Times a Day.  Dispense: 180 tablet; Refill: 3  -     Naltrexone HCl, Pain, 4.5 MG capsule; Take 4.5 mg by mouth Every Night.  Dispense: 90 capsule; Refill: 1  -     olopatadine (PATANOL) 0.1 % ophthalmic solution; Administer 1 drop to both eyes 2 (Two) Times a Day As Needed for Allergies.  Dispense: 5 mL; Refill: 11      Continue same medications.  Last labs reviewed.  Repeat liver function tests today.  Check echocardiogram.    Referral to sleep medicine.    Monitor home blood pressures.      Medications, including side effects, were discussed with the patient. Patient verbalized understanding.  The plan of care was discussed. All questions were answered. Patient verbalized understanding.      Return in about 6 months (around 2/1/2025) for Annual physical.

## 2024-08-02 LAB
ALBUMIN SERPL-MCNC: 4.5 G/DL (ref 3.8–4.9)
ALP SERPL-CCNC: 163 IU/L (ref 44–121)
ALT SERPL-CCNC: 33 IU/L (ref 0–32)
AST SERPL-CCNC: 31 IU/L (ref 0–40)
BILIRUB SERPL-MCNC: 0.4 MG/DL (ref 0–1.2)
BUN SERPL-MCNC: 14 MG/DL (ref 6–24)
BUN/CREAT SERPL: 17 (ref 9–23)
CALCIUM SERPL-MCNC: 9.8 MG/DL (ref 8.7–10.2)
CHLORIDE SERPL-SCNC: 102 MMOL/L (ref 96–106)
CO2 SERPL-SCNC: 25 MMOL/L (ref 20–29)
CREAT SERPL-MCNC: 0.81 MG/DL (ref 0.57–1)
EGFRCR SERPLBLD CKD-EPI 2021: 87 ML/MIN/1.73
GLOBULIN SER CALC-MCNC: 3 G/DL (ref 1.5–4.5)
GLUCOSE SERPL-MCNC: 116 MG/DL (ref 70–99)
POTASSIUM SERPL-SCNC: 4.2 MMOL/L (ref 3.5–5.2)
PROT SERPL-MCNC: 7.5 G/DL (ref 6–8.5)
SODIUM SERPL-SCNC: 141 MMOL/L (ref 134–144)

## 2024-08-15 ENCOUNTER — HOSPITAL ENCOUNTER (OUTPATIENT)
Dept: CARDIOLOGY | Facility: HOSPITAL | Age: 54
Discharge: HOME | End: 2024-08-15
Admitting: FAMILY MEDICINE
Payer: MEDICARE

## 2024-08-15 VITALS
DIASTOLIC BLOOD PRESSURE: 72 MMHG | WEIGHT: 222 LBS | SYSTOLIC BLOOD PRESSURE: 117 MMHG | HEIGHT: 70 IN | HEART RATE: 65 BPM | BODY MASS INDEX: 31.78 KG/M2

## 2024-08-15 DIAGNOSIS — I95.89 OTHER HYPOTENSION: ICD-10-CM

## 2024-08-15 DIAGNOSIS — G90.A POTS (POSTURAL ORTHOSTATIC TACHYCARDIA SYNDROME): ICD-10-CM

## 2024-08-15 LAB
AORTIC DIMENSIONLESS INDEX: 0.7 (DI)
BH CV ECHO MEAS - AO MAX PG: 5.4 MMHG
BH CV ECHO MEAS - AO MEAN PG: 3 MMHG
BH CV ECHO MEAS - AO ROOT DIAM: 3 CM
BH CV ECHO MEAS - AO V2 MAX: 116 CM/SEC
BH CV ECHO MEAS - AO V2 VTI: 23.3 CM
BH CV ECHO MEAS - AVA(I,D): 2.42 CM2
BH CV ECHO MEAS - EDV(CUBED): 117.6 ML
BH CV ECHO MEAS - EDV(MOD-SP2): 104 ML
BH CV ECHO MEAS - EDV(MOD-SP4): 105 ML
BH CV ECHO MEAS - EF(MOD-BP): 57.6 %
BH CV ECHO MEAS - EF(MOD-SP2): 57.7 %
BH CV ECHO MEAS - EF(MOD-SP4): 57.1 %
BH CV ECHO MEAS - ESV(CUBED): 43.3 ML
BH CV ECHO MEAS - ESV(MOD-SP2): 44 ML
BH CV ECHO MEAS - ESV(MOD-SP4): 45 ML
BH CV ECHO MEAS - FS: 28.3 %
BH CV ECHO MEAS - IVS/LVPW: 1 CM
BH CV ECHO MEAS - IVSD: 0.9 CM
BH CV ECHO MEAS - LAT PEAK E' VEL: 12.4 CM/SEC
BH CV ECHO MEAS - LV DIASTOLIC VOL/BSA (35-75): 48.1 CM2
BH CV ECHO MEAS - LV MASS(C)D: 153 GRAMS
BH CV ECHO MEAS - LV MAX PG: 2.6 MMHG
BH CV ECHO MEAS - LV MEAN PG: 1 MMHG
BH CV ECHO MEAS - LV SYSTOLIC VOL/BSA (12-30): 20.6 CM2
BH CV ECHO MEAS - LV V1 MAX: 81.2 CM/SEC
BH CV ECHO MEAS - LV V1 VTI: 15.8 CM
BH CV ECHO MEAS - LVIDD: 4.9 CM
BH CV ECHO MEAS - LVIDS: 3.5 CM
BH CV ECHO MEAS - LVOT AREA: 3.6 CM2
BH CV ECHO MEAS - LVOT DIAM: 2.13 CM
BH CV ECHO MEAS - LVPWD: 0.9 CM
BH CV ECHO MEAS - MED PEAK E' VEL: 10.8 CM/SEC
BH CV ECHO MEAS - MR MAX PG: 22.1 MMHG
BH CV ECHO MEAS - MR MAX VEL: 235.3 CM/SEC
BH CV ECHO MEAS - MV A DUR: 0.1 SEC
BH CV ECHO MEAS - MV A MAX VEL: 58.2 CM/SEC
BH CV ECHO MEAS - MV DEC SLOPE: 187.6 CM/SEC2
BH CV ECHO MEAS - MV DEC TIME: 224 SEC
BH CV ECHO MEAS - MV E MAX VEL: 51.4 CM/SEC
BH CV ECHO MEAS - MV E/A: 0.88
BH CV ECHO MEAS - MV MAX PG: 2.43 MMHG
BH CV ECHO MEAS - MV MEAN PG: 0.92 MMHG
BH CV ECHO MEAS - MV P1/2T: 95.9 MSEC
BH CV ECHO MEAS - MV V2 VTI: 17.4 CM
BH CV ECHO MEAS - MVA(P1/2T): 2.29 CM2
BH CV ECHO MEAS - MVA(VTI): 3.2 CM2
BH CV ECHO MEAS - PA ACC TIME: 0.09 SEC
BH CV ECHO MEAS - PA V2 MAX: 79.8 CM/SEC
BH CV ECHO MEAS - PULM A REVS DUR: 0.09 SEC
BH CV ECHO MEAS - PULM A REVS VEL: 32.7 CM/SEC
BH CV ECHO MEAS - PULM DIAS VEL: 31.7 CM/SEC
BH CV ECHO MEAS - PULM S/D: 1.16
BH CV ECHO MEAS - PULM SYS VEL: 36.7 CM/SEC
BH CV ECHO MEAS - RAP SYSTOLE: 3 MMHG
BH CV ECHO MEAS - RV MAX PG: 1.43 MMHG
BH CV ECHO MEAS - RV V1 MAX: 59.7 CM/SEC
BH CV ECHO MEAS - RV V1 VTI: 12.9 CM
BH CV ECHO MEAS - RVSP: 16.7 MMHG
BH CV ECHO MEAS - SV(LVOT): 56.4 ML
BH CV ECHO MEAS - SV(MOD-SP2): 60 ML
BH CV ECHO MEAS - SV(MOD-SP4): 60 ML
BH CV ECHO MEAS - SVI(LVOT): 25.8 ML/M2
BH CV ECHO MEAS - SVI(MOD-SP2): 27.5 ML/M2
BH CV ECHO MEAS - SVI(MOD-SP4): 27.5 ML/M2
BH CV ECHO MEAS - TAPSE (>1.6): 2.9 CM
BH CV ECHO MEAS - TR MAX PG: 13.7 MMHG
BH CV ECHO MEAS - TR MAX VEL: 185.4 CM/SEC
BH CV ECHO MEASUREMENTS AVERAGE E/E' RATIO: 4.43
BH CV XLRA - RV BASE: 3 CM
BH CV XLRA - TDI S': 13.9 CM/SEC
LEFT ATRIUM VOLUME INDEX: 18.7 ML/M2

## 2024-08-15 PROCEDURE — 93306 TTE W/DOPPLER COMPLETE: CPT

## 2024-09-04 DIAGNOSIS — R74.01 ELEVATED TRANSAMINASE LEVEL: ICD-10-CM

## 2024-09-04 DIAGNOSIS — Z78.0 POSTMENOPAUSAL: Primary | ICD-10-CM

## 2024-09-05 ENCOUNTER — TELEPHONE (OUTPATIENT)
Dept: INTERNAL MEDICINE | Facility: CLINIC | Age: 54
End: 2024-09-05
Payer: MEDICARE

## 2024-09-05 NOTE — TELEPHONE ENCOUNTER
RELAY: ----- Message from Keara Craig sent at 9/4/2024  2:35 PM EDT -----  Please call the patient regarding her result.  I have ordered a bone density scan due to elevated alk phos and being sedentary these past few years since she's been sick.  Also, a liver u/s.

## 2025-01-30 ENCOUNTER — OFFICE VISIT (OUTPATIENT)
Dept: INTERNAL MEDICINE | Facility: CLINIC | Age: 55
End: 2025-01-30
Payer: MEDICARE

## 2025-01-30 VITALS
HEIGHT: 72 IN | TEMPERATURE: 98.4 F | HEART RATE: 94 BPM | BODY MASS INDEX: 29.84 KG/M2 | DIASTOLIC BLOOD PRESSURE: 96 MMHG | SYSTOLIC BLOOD PRESSURE: 142 MMHG | OXYGEN SATURATION: 98 %

## 2025-01-30 DIAGNOSIS — Z12.31 ENCOUNTER FOR SCREENING MAMMOGRAM FOR MALIGNANT NEOPLASM OF BREAST: ICD-10-CM

## 2025-01-30 DIAGNOSIS — G90.A POTS (POSTURAL ORTHOSTATIC TACHYCARDIA SYNDROME): ICD-10-CM

## 2025-01-30 DIAGNOSIS — G89.29 CHRONIC NECK PAIN: ICD-10-CM

## 2025-01-30 DIAGNOSIS — M54.2 CHRONIC NECK PAIN: ICD-10-CM

## 2025-01-30 DIAGNOSIS — R73.03 PREDIABETES: ICD-10-CM

## 2025-01-30 DIAGNOSIS — G90.A POTS (POSTURAL ORTHOSTATIC TACHYCARDIA SYNDROME): Primary | ICD-10-CM

## 2025-01-30 DIAGNOSIS — E03.9 ACQUIRED HYPOTHYROIDISM: ICD-10-CM

## 2025-01-30 DIAGNOSIS — K58.0 IRRITABLE BOWEL SYNDROME WITH DIARRHEA: ICD-10-CM

## 2025-01-30 DIAGNOSIS — E55.9 HYPOVITAMINOSIS D: ICD-10-CM

## 2025-01-30 DIAGNOSIS — Z86.0100 HISTORY OF COLON POLYPS: ICD-10-CM

## 2025-01-30 DIAGNOSIS — Z00.00 WELCOME TO MEDICARE PREVENTIVE VISIT: Primary | ICD-10-CM

## 2025-01-30 DIAGNOSIS — R79.89 ELEVATED LFTS: ICD-10-CM

## 2025-01-30 PROCEDURE — 99214 OFFICE O/P EST MOD 30 MIN: CPT | Performed by: FAMILY MEDICINE

## 2025-01-30 PROCEDURE — 3080F DIAST BP >= 90 MM HG: CPT | Performed by: FAMILY MEDICINE

## 2025-01-30 PROCEDURE — G0402 INITIAL PREVENTIVE EXAM: HCPCS | Performed by: FAMILY MEDICINE

## 2025-01-30 PROCEDURE — 1125F AMNT PAIN NOTED PAIN PRSNT: CPT | Performed by: FAMILY MEDICINE

## 2025-01-30 PROCEDURE — 3077F SYST BP >= 140 MM HG: CPT | Performed by: FAMILY MEDICINE

## 2025-01-30 PROCEDURE — 1170F FXNL STATUS ASSESSED: CPT | Performed by: FAMILY MEDICINE

## 2025-01-30 RX ORDER — FAMOTIDINE 40 MG/1
40 TABLET, FILM COATED ORAL 2 TIMES DAILY PRN
Qty: 180 TABLET | Refills: 3 | Status: SHIPPED | OUTPATIENT
Start: 2025-01-30

## 2025-01-30 NOTE — PATIENT INSTRUCTIONS
Advance Care Planning and Advance Directives     You make decisions on a daily basis - decisions about where you want to live, your career, your home, your life. Perhaps one of the most important decisions you face is your choice for future medical care. Take time to talk with your family and your healthcare team and start planning today.  Advance Care Planning is a process that can help you:  Understand possible future healthcare decisions in light of your own experiences  Reflect on those decision in light of your goals and values  Discuss your decisions with those closest to you and the healthcare professionals that care for you  Make a plan by creating a document that reflects your wishes    Surrogate Decision Maker  In the event of a medical emergency, which has left you unable to communicate or to make your own decisions, you would need someone to make decisions for you.  It is important to discuss your preferences for medical treatment with this person while you are in good health.     Qualities of a surrogate decision maker:  Willing to take on this role and responsibility  Knows what you want for future medical care  Willing to follow your wishes even if they don't agree with them  Able to make difficult medical decisions under stressful circumstances    Advance Directives  These are legal documents you can create that will guide your healthcare team and decision maker(s) when needed. These documents can be stored in the electronic medical record.    Living Will - a legal document to guide your care if you have a terminal condition or a serious illness and are unable to communicate. States vary by statute in document names/types, but most forms may include one or more of the following:        -  Directions regarding life-prolonging treatments        -  Directions regarding artificially provided nutrition/hydration        -  Choosing a healthcare decision maker        -  Direction regarding organ/tissue  donation    Durable Power of  for Healthcare - this document names an -in-fact to make medical decisions for you, but it may also allow this person to make personal and financial decisions for you. Please seek the advice of an  if you need this type of document.    **Advance Directives are not required and no one may discriminate against you if you do not sign one.    Medical Orders  Many states allow specific forms/orders signed by your physician to record your wishes for medical treatment in your current state of health. This form, signed in personal communication with your physician, addresses resuscitation and other medical interventions that you may or may not want.      For more information or to schedule a time with a [facility] Advance Care Planning Facilitator contact: [email or web address] or call [phone] and someone will contact you directly.

## 2025-01-30 NOTE — PROGRESS NOTES
The ABCs of the Annual Wellness Visit  [place] to Medicare Visit    Subjective     Nadja Shipley is a 54 y.o. female who presents for a  Welcome to Medicare Visit.    The following portions of the patient's history were reviewed and   updated as appropriate: allergies, current medications, past family history, past medical history, past social history, past surgical history, and problem list.     Compared to one year ago, the patient feels her physical   health is the same.    Compared to one year ago, the patient feels her mental   health is the same.    Recent Hospitalizations:  She was not admitted to the hospital during the last year.       Current Medical Providers:  Patient Care Team:  Keara Craig MD as PCP - Family Medicine  Darwin Bravo MD as Consulting Physician (Sleep Medicine)  Nathan Peña MD as Neurologist (Neurology)  Darrian Bailey III, MD as Consulting Physician (Cardiology)  Nik Zavaleta MD as Consulting Physician (Pulmonary Disease)    Outpatient Medications Prior to Visit   Medication Sig Dispense Refill    acetaZOLAMIDE (DIAMOX) 250 MG tablet Take 1 tablet by mouth 3 (Three) Times a Day. 270 tablet 3    albuterol sulfate HFA (Ventolin HFA) 108 (90 Base) MCG/ACT inhaler Inhale 2 puffs into the lungs Every 4 (Four) Hours As Needed for Wheezing. 18 g 1    Crisaborole (Eucrisa) 2 % ointment Apply 1 application topically to the appropriate area as directed 2 (Two) Times a Day. 60 g 2    dicyclomine (BENTYL) 20 MG tablet Take 1 tablet by mouth Every 6 (Six) Hours. 120 tablet 5    levothyroxine (SYNTHROID, LEVOTHROID) 75 MCG tablet Take 1 tablet by mouth Daily. 90 tablet 3    methocarbamol (ROBAXIN) 750 MG tablet Take 1 tablet by mouth Daily. 90 tablet 3    olopatadine (PATANOL) 0.1 % ophthalmic solution Administer 1 drop to both eyes 2 (Two) Times a Day As Needed for Allergies. 5 mL 11    Naltrexone HCl, Pain, 4.5 MG capsule Take 4.5 mg by mouth Every Night. 90 capsule 1     "famotidine (PEPCID) 40 MG tablet Take 1 tablet by mouth 2 (Two) Times a Day. 180 tablet 3     No facility-administered medications prior to visit.       No opioid medication identified on active medication list. I have reviewed chart for other potential  high risk medication/s and harmful drug interactions in the elderly.        Aspirin is not on active medication list.  Aspirin use is not indicated based on review of current medical condition/s. Risk of harm outweighs potential benefits.  .    Patient Active Problem List   Diagnosis    Chronic neck pain    Rosacea    Insomnia    Annual physical exam    Hot flashes    Screening for colon cancer    History of colon polyps    Irritable bowel syndrome with diarrhea    POTS (postural orthostatic tachycardia syndrome)    Supine hypertension    Adverse effect of COVID-19 vaccine    Orthostatic hypertension    Idiopathic intracranial hypertension    Metabolic syndrome    Allergic conjunctivitis of both eyes    Eosinophilic esophagitis    Numbness and tingling of both legs    Acquired hypothyroidism    Prediabetes    Elevated LFTs     Advance Care Planning   Advance Care Planning     Advance Directive is not on file.  ACP discussion was held with the patient during this visit. Patient does not have an advance directive, information provided.       Objective   Vitals:    01/30/25 1258   BP: 142/96   BP Location: Left arm   Patient Position: Sitting   Cuff Size: Large Adult   Pulse: 94   Temp: 98.4 °F (36.9 °C)   TempSrc: Infrared   SpO2: 98%   Height: 182.9 cm (72\")   PainSc:   4   PainLoc: Comment: muscles     Estimated body mass index is 29.84 kg/m² as calculated from the following:    Height as of this encounter: 182.9 cm (72\").    Weight as of 10/14/24: 99.8 kg (220 lb).           Does the patient have evidence of cognitive impairment?   No         Procedures       HEALTH RISK ASSESSMENT    Smoking Status:  Social History     Tobacco Use   Smoking Status Never    Passive " exposure: Never   Smokeless Tobacco Never     Alcohol Consumption:  Social History     Substance and Sexual Activity   Alcohol Use No       Fall Risk Screen:    CUATE Fall Risk Assessment was completed, and patient is at HIGH risk for falls. Assessment completed on:2025    Depression Screen:       2025     1:09 PM   PHQ-2/PHQ-9 Depression Screening   Little interest or pleasure in doing things Almost all   Feeling down, depressed, or hopeless Almost all   Trouble falling or staying asleep, or sleeping too much Almost all   Feeling tired or having little energy Almost all   Poor appetite or overeating Almost all   Feeling bad about yourself - or that you are a failure or have let yourself or your family down Almost all   Trouble concentrating on things, such as reading the newspaper or watching television Almost all   Moving or speaking so slowly that other people could have noticed? Or the opposite - being so fidgety or restless that you have been moving around a lot more than usual. Not at all   Thoughts that you would be better off dead or hurting yourself in some way Not at all   Patient Health Questionnaire-9 Score 21   How difficult have these problems made it for you to do your work, take care of things at home, or get along with other people? Extremely difficult        Significant value       Health Habits and Functional and Cognitive Screenin/30/2025     1:06 PM   Functional & Cognitive Status   Do you have difficulty preparing food and eating? Yes   Do you have difficulty bathing yourself, getting dressed or grooming yourself? Yes   Do you have difficulty using the toilet? Yes   Do you have difficulty moving around from place to place? Yes   Do you have trouble with steps or getting out of a bed or a chair? Yes   Current Diet Low Carb Diet   Dental Exam Up to date   Eye Exam Not up to date   Exercise (times per week) 6 times per week   Current Exercises Include Walking   Do you need help  using the phone?  No   Are you deaf or do you have serious difficulty hearing?  No   Do you need help to go to places out of walking distance? No   Do you need help shopping? Yes   Do you need help preparing meals?  Yes   Do you need help with housework?  Yes   Do you need help with laundry? Yes   Do you need help taking your medications? No   Do you need help managing money? No   Do you ever drive or ride in a car without wearing a seat belt? No   Have you felt unusual stress, anger or loneliness in the last month? No   Who do you live with? Spouse   If you need help, do you have trouble finding someone available to you? No   Have you been bothered in the last four weeks by sexual problems? No   Do you have difficulty concentrating, remembering or making decisions? Yes       Visual Acuity:    Vision Screening    Right eye Left eye Both eyes   Without correction 20/50 20/50 20/50   With correction          Age-appropriate Screening Schedule:  Refer to the list below for future screening recommendations based on patient's age, sex and/or medical conditions. Orders for these recommended tests are listed in the plan section. The patient has been provided with a written plan.    Health Maintenance   Topic Date Due    HEPATITIS C SCREENING  Never done    ZOSTER VACCINE (1 of 2) Never done    PAP SMEAR  11/08/2022    INFLUENZA VACCINE  07/01/2024    COVID-19 Vaccine (2 - 2024-25 season) 09/01/2024    BMI FOLLOWUP  04/18/2025    MAMMOGRAM  01/25/2026    ANNUAL WELLNESS VISIT  01/30/2026    COLORECTAL CANCER SCREENING  06/07/2026    TDAP/TD VACCINES (2 - Td or Tdap) 08/17/2027    Pneumococcal Vaccine 0-64  Aged Out        CMS Preventative Services Quick Reference  Risk Factors Identified During Encounter    Immunizations Discussed/Encouraged:  declines  The above risks/problems have been discussed with the patient.  Pertinent information has been shared with the patient in the After Visit Summary.    Follow Up:   Initial  "Medicare Visit in one year    An After Visit Summary and PPPS were made available to the patient.      Additional E&M Note during same encounter follows:  Patient has multiple medical problems which are significant and separately identifiable that require additional work above and beyond the Medicare Wellness Visit.      Chief Complaint  Welcome To Medicare, Dizziness, and Prediabetes    Subjective        Dizziness  Symptoms include fatigue and myalgias.    Pertinent negative symptoms include no cough.     Nadja Shipley is also being seen today for POTS, prediabetes.    Review of Systems   Constitutional:  Positive for activity change and fatigue.   Respiratory:  Negative for cough.    Musculoskeletal:  Positive for myalgias.   Neurological:  Positive for dizziness.   Psychiatric/Behavioral:  Positive for dysphoric mood. Negative for suicidal ideas.        Objective   Vital Signs:  /96 (BP Location: Left arm, Patient Position: Sitting, Cuff Size: Large Adult)   Pulse 94   Temp 98.4 °F (36.9 °C) (Infrared)   Ht 182.9 cm (72\")   SpO2 98%   BMI 29.84 kg/m²     Physical Exam  Vitals and nursing note reviewed.   Constitutional:       General: She is not in acute distress.     Appearance: She is well-developed. She is ill-appearing (chronically).   HENT:      Head: Normocephalic and atraumatic.      Right Ear: External ear normal.      Left Ear: External ear normal.      Nose: Nose normal.      Mouth/Throat:      Mouth: Mucous membranes are moist.      Pharynx: Oropharynx is clear.   Eyes:      General: No scleral icterus.        Right eye: No discharge.         Left eye: No discharge.      Extraocular Movements: Extraocular movements intact.      Conjunctiva/sclera: Conjunctivae normal.      Pupils: Pupils are equal, round, and reactive to light.   Neck:      Thyroid: No thyromegaly.   Cardiovascular:      Rate and Rhythm: Normal rate and regular rhythm.      Heart sounds: Normal heart sounds. No murmur " heard.     No friction rub. No gallop.   Pulmonary:      Effort: Pulmonary effort is normal. No respiratory distress.      Breath sounds: Normal breath sounds. No wheezing or rales.   Abdominal:      General: Bowel sounds are normal. There is no distension.      Palpations: Abdomen is soft. There is no mass.      Tenderness: There is no abdominal tenderness.      Hernia: No hernia is present.   Musculoskeletal:         General: No deformity.      Cervical back: Neck supple. No rigidity.      Right lower leg: No edema.      Left lower leg: No edema.   Lymphadenopathy:      Cervical: No cervical adenopathy.   Skin:     General: Skin is warm and dry.      Findings: No rash.   Neurological:      General: No focal deficit present.      Mental Status: She is alert and oriented to person, place, and time.      Cranial Nerves: No cranial nerve deficit.      Motor: No abnormal muscle tone.      Coordination: Coordination normal.      Deep Tendon Reflexes: Reflexes are normal and symmetric. Reflexes normal.   Psychiatric:         Mood and Affect: Mood normal.         Behavior: Behavior normal.         Thought Content: Thought content normal.         Judgment: Judgment normal.                      Assessment and Plan   Diagnoses and all orders for this visit:    1. Welcome to Medicare preventive visit (Primary)    2. POTS (postural orthostatic tachycardia syndrome)  -     Magnesium    3. Acquired hypothyroidism  -     Lipid Panel With / Chol / HDL Ratio  -     TSH  -     CBC (No Diff)  -     T4, Free    4. Chronic neck pain    5. Prediabetes  -     Comprehensive Metabolic Panel  -     Hemoglobin A1c  -     Vitamin B12    6. Irritable bowel syndrome with diarrhea    7. History of colon polyps    8. Elevated LFTs    9. Encounter for screening mammogram for malignant neoplasm of breast  -     Mammo screening digital tomosynthesis bilateral w CAD; Future    10. Hypovitaminosis D  -     Vitamin D,25-Hydroxy    Other orders  -      Naltrexone HCl, Pain, 4.5 MG capsule; Take 4.5 mg by mouth Every Night.  Dispense: 90 capsule; Refill: 1  -     famotidine (PEPCID) 40 MG tablet; Take 1 tablet by mouth 2 (Two) Times a Day As Needed for Heartburn.  Dispense: 180 tablet; Refill: 3      Recheck labs today.  Mammogram ordered.  Pap smear due and she declines today.      Doing chelation therapy with Dr. Jaramillo.         Follow Up   Return in about 6 months (around 7/30/2025).  Patient was given instructions and counseling regarding her condition or for health maintenance advice. Please see specific information pulled into the AVS if appropriate.

## 2025-01-31 LAB
25(OH)D3+25(OH)D2 SERPL-MCNC: 54.4 NG/ML (ref 30–100)
ALBUMIN SERPL-MCNC: 4.4 G/DL (ref 3.8–4.9)
ALP SERPL-CCNC: 138 IU/L (ref 44–121)
ALT SERPL-CCNC: 24 IU/L (ref 0–32)
AST SERPL-CCNC: 28 IU/L (ref 0–40)
BILIRUB SERPL-MCNC: 0.3 MG/DL (ref 0–1.2)
BUN SERPL-MCNC: 11 MG/DL (ref 6–24)
BUN/CREAT SERPL: 15 (ref 9–23)
CALCIUM SERPL-MCNC: 9.4 MG/DL (ref 8.7–10.2)
CHLORIDE SERPL-SCNC: 103 MMOL/L (ref 96–106)
CHOLEST SERPL-MCNC: 243 MG/DL (ref 100–199)
CHOLEST/HDLC SERPL: 3.7 RATIO (ref 0–4.4)
CO2 SERPL-SCNC: 23 MMOL/L (ref 20–29)
CREAT SERPL-MCNC: 0.75 MG/DL (ref 0.57–1)
EGFRCR SERPLBLD CKD-EPI 2021: 96 ML/MIN/1.73
ERYTHROCYTE [DISTWIDTH] IN BLOOD BY AUTOMATED COUNT: 12.5 % (ref 11.7–15.4)
GLOBULIN SER CALC-MCNC: 2.5 G/DL (ref 1.5–4.5)
GLUCOSE SERPL-MCNC: 104 MG/DL (ref 70–99)
HBA1C MFR BLD: 6.2 % (ref 4.8–5.6)
HCT VFR BLD AUTO: 46 % (ref 34–46.6)
HDLC SERPL-MCNC: 66 MG/DL
HGB BLD-MCNC: 15 G/DL (ref 11.1–15.9)
LDLC SERPL CALC-MCNC: 157 MG/DL (ref 0–99)
MAGNESIUM SERPL-MCNC: 2.5 MG/DL (ref 1.6–2.3)
MCH RBC QN AUTO: 30.1 PG (ref 26.6–33)
MCHC RBC AUTO-ENTMCNC: 32.6 G/DL (ref 31.5–35.7)
MCV RBC AUTO: 92 FL (ref 79–97)
PLATELET # BLD AUTO: 284 X10E3/UL (ref 150–450)
POTASSIUM SERPL-SCNC: 3.9 MMOL/L (ref 3.5–5.2)
PROT SERPL-MCNC: 6.9 G/DL (ref 6–8.5)
RBC # BLD AUTO: 4.99 X10E6/UL (ref 3.77–5.28)
SODIUM SERPL-SCNC: 142 MMOL/L (ref 134–144)
T4 FREE SERPL-MCNC: 1.3 NG/DL (ref 0.82–1.77)
TRIGL SERPL-MCNC: 112 MG/DL (ref 0–149)
TSH SERPL DL<=0.005 MIU/L-ACNC: 5.14 UIU/ML (ref 0.45–4.5)
VIT B12 SERPL-MCNC: 814 PG/ML (ref 232–1245)
VLDLC SERPL CALC-MCNC: 20 MG/DL (ref 5–40)
WBC # BLD AUTO: 5.7 X10E3/UL (ref 3.4–10.8)

## 2025-05-14 ENCOUNTER — TELEPHONE (OUTPATIENT)
Dept: INTERNAL MEDICINE | Facility: CLINIC | Age: 55
End: 2025-05-14
Payer: MEDICARE

## 2025-05-14 NOTE — TELEPHONE ENCOUNTER
She will bring a copy of the paperwork back in for Dr. Craig to complete.  I do not see the originals.  She will bring in today before 12.

## 2025-05-14 NOTE — TELEPHONE ENCOUNTER
Patient dropped form off. Has asked that she be called when ready to be picked up. Form has been placed on Providers desk.

## 2025-07-31 ENCOUNTER — OFFICE VISIT (OUTPATIENT)
Dept: INTERNAL MEDICINE | Facility: CLINIC | Age: 55
End: 2025-07-31
Payer: MEDICARE

## 2025-07-31 VITALS
OXYGEN SATURATION: 98 % | HEART RATE: 66 BPM | DIASTOLIC BLOOD PRESSURE: 82 MMHG | HEIGHT: 72 IN | TEMPERATURE: 98 F | BODY MASS INDEX: 31.72 KG/M2 | SYSTOLIC BLOOD PRESSURE: 122 MMHG | WEIGHT: 234.2 LBS

## 2025-07-31 DIAGNOSIS — R73.03 PREDIABETES: ICD-10-CM

## 2025-07-31 DIAGNOSIS — R79.89 ELEVATED LFTS: ICD-10-CM

## 2025-07-31 DIAGNOSIS — M54.2 CHRONIC NECK PAIN: ICD-10-CM

## 2025-07-31 DIAGNOSIS — K58.0 IRRITABLE BOWEL SYNDROME WITH DIARRHEA: ICD-10-CM

## 2025-07-31 DIAGNOSIS — E55.9 HYPOVITAMINOSIS D: ICD-10-CM

## 2025-07-31 DIAGNOSIS — G89.29 CHRONIC NECK PAIN: ICD-10-CM

## 2025-07-31 DIAGNOSIS — E03.9 ACQUIRED HYPOTHYROIDISM: ICD-10-CM

## 2025-07-31 DIAGNOSIS — Z00.00 ROUTINE HEALTH MAINTENANCE: ICD-10-CM

## 2025-07-31 DIAGNOSIS — Z86.0100 HISTORY OF COLON POLYPS: ICD-10-CM

## 2025-07-31 DIAGNOSIS — G90.A POTS (POSTURAL ORTHOSTATIC TACHYCARDIA SYNDROME): Primary | ICD-10-CM

## 2025-07-31 NOTE — PROGRESS NOTES
Subjective   Nadja Shipley is a 55 y.o. female presenting today for follow up of   Chief Complaint   Patient presents with    pots    Hypothyroidism     6 mo f/u    Prediabetes       Shoulder Injury     Dizziness  Associated symptoms include arthralgias.   Hypothyroidism       Pt presents for 3 month f/u on debilitating POTS.  She is still working with Dr. Jaramillo, with [facility], naturopathic medicine; she is doing oral chelation therapy with Dr. Stephen Mercado with [facility] as part of a research study.  She has seen Dr. Bailey and [facility], Dr. Carrasquillo, and Dr. Peña, and Dr. Zavaleta.  She saw Dr. Monreal, GI.  She enjoys her grandson.  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  She is now unable to work or do much at all.        Patient Active Problem List   Diagnosis    Chronic neck pain    Rosacea    Insomnia    Annual physical exam    Hot flashes    Screening for colon cancer    History of colon polyps    Irritable bowel syndrome with diarrhea    POTS (postural orthostatic tachycardia syndrome)    Supine hypertension    Adverse effect of COVID-19 vaccine    Orthostatic hypertension    Idiopathic intracranial hypertension    Metabolic syndrome    Allergic conjunctivitis of both eyes    Eosinophilic esophagitis    Numbness and tingling of both legs    Acquired hypothyroidism    Prediabetes    Elevated LFTs       Current Outpatient Medications on File Prior to Visit   Medication Sig    acetaZOLAMIDE (DIAMOX) 250 MG tablet Take 1 tablet by mouth 3 (Three) Times a Day. (Patient taking differently: Take 1 tablet by mouth 3 (Three) Times a Day. PRN)    albuterol sulfate HFA (Ventolin HFA) 108 (90 Base) MCG/ACT inhaler Inhale 2 puffs into the lungs Every 4 (Four) Hours As Needed for Wheezing.    Crisaborole (Eucrisa) 2  "% ointment Apply 1 application topically to the appropriate area as directed 2 (Two) Times a Day.    dicyclomine (BENTYL) 20 MG tablet Take 1 tablet by mouth Every 6 (Six) Hours.    famotidine (PEPCID) 40 MG tablet Take 1 tablet by mouth 2 (Two) Times a Day As Needed for Heartburn.    levothyroxine (SYNTHROID, LEVOTHROID) 75 MCG tablet Take 1 tablet by mouth Daily.    methocarbamol (ROBAXIN) 750 MG tablet Take 1 tablet by mouth Daily.    Misc. Devices (Bath/Shower Seat) misc Use in shower as directed.    Naltrexone HCl, Pain, 4.5 MG capsule Take 4.5 mg by mouth Every Night.    olopatadine (PATANOL) 0.1 % ophthalmic solution Administer 1 drop to both eyes 2 (Two) Times a Day As Needed for Allergies.     No current facility-administered medications on file prior to visit.          The following portions of the patient's history were reviewed and updated as appropriate: allergies, current medications, past family history, past medical history, past social history, past surgical history and problem list.    Review of Systems   Musculoskeletal:  Positive for arthralgias.   Neurological:  Positive for dizziness.       Objective   Vitals:    07/31/25 1401   BP: 122/82   BP Location: Left arm   Patient Position: Sitting   Cuff Size: Adult   Pulse: 66   Temp: 98 °F (36.7 °C)   TempSrc: Infrared   SpO2: 98%   Weight: 106 kg (234 lb 3.2 oz)   Height: 182.9 cm (72\")       BP Readings from Last 3 Encounters:   07/31/25 122/82   01/30/25 142/96   10/14/24 135/86        Wt Readings from Last 3 Encounters:   07/31/25 106 kg (234 lb 3.2 oz)   10/14/24 99.8 kg (220 lb)   08/15/24 101 kg (222 lb)        Body mass index is 31.76 kg/m².  Nursing notes and vitals reviewed.    Physical Exam  Vitals and nursing note reviewed.   Constitutional:       Appearance: Normal appearance. She is well-developed.   HENT:      Head: Normocephalic and atraumatic.      Mouth/Throat:      Mouth: Mucous membranes are moist.   Eyes:      Extraocular " Movements: Extraocular movements intact.      Conjunctiva/sclera: Conjunctivae normal.   Neck:      Thyroid: No thyromegaly.   Cardiovascular:      Rate and Rhythm: Normal rate and regular rhythm.      Heart sounds: Normal heart sounds.   Pulmonary:      Effort: Pulmonary effort is normal.      Breath sounds: Normal breath sounds.   Abdominal:      Palpations: Abdomen is soft.      Tenderness: There is no abdominal tenderness.   Musculoskeletal:      Cervical back: Neck supple. No rigidity.      Right lower leg: No edema.      Left lower leg: No edema.      Comments: + lift off test   Skin:     General: Skin is warm and dry.   Neurological:      General: No focal deficit present.      Mental Status: She is alert and oriented to person, place, and time.   Psychiatric:         Mood and Affect: Mood normal.         Behavior: Behavior normal.         No results found for this or any previous visit (from the past 4 weeks).      Assessment & Plan   Diagnoses and all orders for this visit:    1. POTS (postural orthostatic tachycardia syndrome) (Primary)  -     SARS-CoV-2 Semi-Quant Total Ab    2. Acquired hypothyroidism  -     Lipid Panel With / Chol / HDL Ratio  -     TSH  -     CBC (No Diff)  -     T4, Free    3. Chronic neck pain    4. Prediabetes  -     Hemoglobin A1c  -     Vitamin B12    5. Irritable bowel syndrome with diarrhea    6. History of colon polyps    7. Elevated LFTs  -     Comprehensive Metabolic Panel    8. Routine health maintenance    9. Hypovitaminosis D  -     Vitamin D,25-Hydroxy      POTS. Continue same medications.  Recheck labs today.  Hypothyroidism. Last TSH mildly elevated.  On levothyroxine 75 mcg daily.  Recheck levels.  Chronic neck pain.  Has methocarbamol for flares.  Prediabetes.  Last A1c 6.2.  Lifestyle measures.  IBS with diarrhea.  Controlled with dietary measures, dicyclomine, famotidine.  History of colon polyps.  Next colonoscopy due 6/2026.  Routine health maint.  Mammogram due  and she declines at this time.  Pap smear and HPV negative 11/2019.  She declines repeat at this time.  Tdap UTD 2017.  Other vaccines declined.      Medications, including side effects, were discussed with the patient. Patient verbalized understanding.  The plan of care was discussed. All questions were answered. Patient verbalized understanding.      Return in about 6 months (around 1/31/2026) for Medicare Wellness.

## 2025-08-04 LAB
25(OH)D3+25(OH)D2 SERPL-MCNC: 43.6 NG/ML (ref 30–100)
ALBUMIN SERPL-MCNC: 4.4 G/DL (ref 3.8–4.9)
ALP SERPL-CCNC: 133 IU/L (ref 44–121)
ALT SERPL-CCNC: 27 IU/L (ref 0–32)
AST SERPL-CCNC: 23 IU/L (ref 0–40)
BILIRUB SERPL-MCNC: 0.3 MG/DL (ref 0–1.2)
BUN SERPL-MCNC: 11 MG/DL (ref 6–24)
BUN/CREAT SERPL: 13 (ref 9–23)
CALCIUM SERPL-MCNC: 8.9 MG/DL (ref 8.7–10.2)
CHLORIDE SERPL-SCNC: 107 MMOL/L (ref 96–106)
CHOLEST SERPL-MCNC: 254 MG/DL (ref 100–199)
CHOLEST/HDLC SERPL: 3.8 RATIO (ref 0–4.4)
CO2 SERPL-SCNC: 22 MMOL/L (ref 20–29)
CREAT SERPL-MCNC: 0.85 MG/DL (ref 0.57–1)
EGFRCR SERPLBLD CKD-EPI 2021: 82 ML/MIN/1.73
ERYTHROCYTE [DISTWIDTH] IN BLOOD BY AUTOMATED COUNT: 12.7 % (ref 11.7–15.4)
GLOBULIN SER CALC-MCNC: 2.4 G/DL (ref 1.5–4.5)
GLUCOSE SERPL-MCNC: 114 MG/DL (ref 70–99)
HBA1C MFR BLD: 6.1 % (ref 4.8–5.6)
HCT VFR BLD AUTO: 45.7 % (ref 34–46.6)
HDLC SERPL-MCNC: 66 MG/DL
HGB BLD-MCNC: 14.8 G/DL (ref 11.1–15.9)
LDLC SERPL CALC-MCNC: 167 MG/DL (ref 0–99)
MCH RBC QN AUTO: 30.1 PG (ref 26.6–33)
MCHC RBC AUTO-ENTMCNC: 32.4 G/DL (ref 31.5–35.7)
MCV RBC AUTO: 93 FL (ref 79–97)
PLATELET # BLD AUTO: 290 X10E3/UL (ref 150–450)
POTASSIUM SERPL-SCNC: 4.2 MMOL/L (ref 3.5–5.2)
PROT SERPL-MCNC: 6.8 G/DL (ref 6–8.5)
RBC # BLD AUTO: 4.92 X10E6/UL (ref 3.77–5.28)
SARS-COV-2 AB SERPL IA-ACNC: 2698 U/ML
SARS-COV-2 AB SERPL IA-ACNC: NORMAL U/ML
SARS-COV-2 AB SERPL-IMP: POSITIVE
SODIUM SERPL-SCNC: 143 MMOL/L (ref 134–144)
T4 FREE SERPL-MCNC: 1.44 NG/DL (ref 0.82–1.77)
TRIGL SERPL-MCNC: 117 MG/DL (ref 0–149)
TSH SERPL DL<=0.005 MIU/L-ACNC: 1.49 UIU/ML (ref 0.45–4.5)
VIT B12 SERPL-MCNC: 823 PG/ML (ref 232–1245)
VLDLC SERPL CALC-MCNC: 21 MG/DL (ref 5–40)
WBC # BLD AUTO: 6.3 X10E3/UL (ref 3.4–10.8)

## 2025-08-19 DIAGNOSIS — E03.9 HYPOTHYROIDISM, UNSPECIFIED TYPE: ICD-10-CM

## 2025-08-20 RX ORDER — LEVOTHYROXINE SODIUM 75 UG/1
75 TABLET ORAL DAILY
Qty: 90 TABLET | Refills: 3 | Status: SHIPPED | OUTPATIENT
Start: 2025-08-20

## 2025-08-27 DIAGNOSIS — E03.9 HYPOTHYROIDISM, UNSPECIFIED TYPE: ICD-10-CM

## 2025-08-27 RX ORDER — LEVOTHYROXINE SODIUM 75 UG/1
75 TABLET ORAL DAILY
Qty: 90 TABLET | Refills: 3 | Status: SHIPPED | OUTPATIENT
Start: 2025-08-27

## (undated) DEVICE — JACKT LAB F/R KNIT CUFF/COLR XLG BLU

## (undated) DEVICE — TRAP POLYP 4CHAMBER

## (undated) DEVICE — SNAR POLYP SENSATION STDOVL 27 240 BX40

## (undated) DEVICE — SYR LL 3CC

## (undated) DEVICE — SPNG GZ WOVN 4X4IN 12PLY 10/BX STRL

## (undated) DEVICE — Device

## (undated) DEVICE — KT ORCA ORCAPOD DISP STRL

## (undated) DEVICE — BW-412T DISP COMBO CLEANING BRUSH: Brand: SINGLE USE COMBINATION CLEANING BRUSH

## (undated) DEVICE — ADAPT CLN BIOGUARD AIR/H2O DISP

## (undated) DEVICE — SUCTION CANISTER, 1000CC,SAFELINER: Brand: DEROYAL

## (undated) DEVICE — VIAL FORMALIN CAP 10P 40ML

## (undated) DEVICE — GLV SURG SENSICARE PI MIC PF SZ7.5 LF STRL

## (undated) DEVICE — FRCP BX RADJAW4 NDL 2.8 240CM LG OG BX40